# Patient Record
Sex: FEMALE | Race: WHITE | NOT HISPANIC OR LATINO | Employment: OTHER | URBAN - METROPOLITAN AREA
[De-identification: names, ages, dates, MRNs, and addresses within clinical notes are randomized per-mention and may not be internally consistent; named-entity substitution may affect disease eponyms.]

---

## 2023-10-09 DIAGNOSIS — I10 ESSENTIAL HYPERTENSION: ICD-10-CM

## 2023-10-09 DIAGNOSIS — E78.2 MIXED HYPERLIPIDEMIA: ICD-10-CM

## 2023-10-09 RX ORDER — METOPROLOL SUCCINATE 25 MG/1
25 TABLET, EXTENDED RELEASE ORAL DAILY
COMMUNITY
End: 2023-10-09 | Stop reason: SDUPTHER

## 2023-10-09 RX ORDER — PRAVASTATIN SODIUM 20 MG/1
1 TABLET ORAL NIGHTLY
COMMUNITY
Start: 2017-06-05 | End: 2023-10-09 | Stop reason: SDUPTHER

## 2023-10-09 RX ORDER — METOPROLOL SUCCINATE 25 MG/1
25 TABLET, EXTENDED RELEASE ORAL 2 TIMES DAILY
Qty: 180 TABLET | Refills: 3 | Status: SHIPPED | OUTPATIENT
Start: 2023-10-09

## 2023-10-09 RX ORDER — PRAVASTATIN SODIUM 20 MG/1
20 TABLET ORAL NIGHTLY
Qty: 90 TABLET | Refills: 3 | Status: SHIPPED | OUTPATIENT
Start: 2023-10-09 | End: 2024-10-08

## 2023-11-29 PROBLEM — I10 HTN (HYPERTENSION), BENIGN: Status: ACTIVE | Noted: 2023-11-29

## 2023-11-29 PROBLEM — R55 VASODEPRESSOR SYNCOPE: Status: ACTIVE | Noted: 2023-11-29

## 2023-11-29 PROBLEM — I49.1 PREMATURE ATRIAL CONTRACTIONS: Status: ACTIVE | Noted: 2023-11-29

## 2023-11-29 PROBLEM — G44.309 POST-CONCUSSION HEADACHE: Status: ACTIVE | Noted: 2023-11-29

## 2023-11-29 PROBLEM — Z86.79 ATRIAL FIBRILLATION, CURRENTLY IN SINUS RHYTHM: Status: ACTIVE | Noted: 2023-11-29

## 2023-11-29 PROBLEM — R00.2 PALPITATIONS: Status: ACTIVE | Noted: 2023-11-29

## 2023-11-29 RX ORDER — CHOLECALCIFEROL (VITAMIN D3) 50 MCG
2000 TABLET ORAL 2 TIMES DAILY
COMMUNITY

## 2023-11-29 RX ORDER — METRONIDAZOLE 10 MG/G
GEL TOPICAL DAILY
COMMUNITY
End: 2024-01-01 | Stop reason: ENTERED-IN-ERROR

## 2023-11-29 RX ORDER — HYOSCYAMINE SULFATE 0.12 MG/1
0.12 TABLET SUBLINGUAL EVERY 4 HOURS PRN
COMMUNITY

## 2023-11-29 RX ORDER — AMOXICILLIN 500 MG/1
500 TABLET, FILM COATED ORAL
COMMUNITY
Start: 2023-02-20 | End: 2024-01-01 | Stop reason: ENTERED-IN-ERROR

## 2023-11-29 RX ORDER — DIAZEPAM 5 MG/1
5 TABLET ORAL
COMMUNITY
End: 2024-01-01 | Stop reason: ENTERED-IN-ERROR

## 2023-11-29 RX ORDER — NEOMYCIN SULFATE, POLYMYXIN B SULFATE AND DEXAMETHASONE 3.5; 10000; 1 MG/ML; [USP'U]/ML; MG/ML
1 SUSPENSION/ DROPS OPHTHALMIC
COMMUNITY

## 2023-11-29 RX ORDER — DORZOLAMIDE HCL 20 MG/ML
SOLUTION/ DROPS OPHTHALMIC
COMMUNITY
End: 2024-01-01 | Stop reason: ENTERED-IN-ERROR

## 2023-11-29 RX ORDER — EPINEPHRINE 0.22MG
200 AEROSOL WITH ADAPTER (ML) INHALATION DAILY
COMMUNITY

## 2023-11-29 RX ORDER — PSEUDOEPHEDRINE HCL 30 MG
600 TABLET ORAL
COMMUNITY
End: 2024-01-01 | Stop reason: ENTERED-IN-ERROR

## 2023-11-29 RX ORDER — ELECTROLYTES/DEXTROSE
SOLUTION, ORAL ORAL
COMMUNITY
End: 2024-01-01 | Stop reason: ENTERED-IN-ERROR

## 2023-11-29 RX ORDER — DORZOLAMIDE HYDROCHLORIDE AND TIMOLOL MALEATE 20; 5 MG/ML; MG/ML
1 SOLUTION/ DROPS OPHTHALMIC 2 TIMES DAILY
COMMUNITY
Start: 2023-07-23

## 2023-11-29 RX ORDER — AMLODIPINE BESYLATE 5 MG/1
5 TABLET ORAL EVERY EVENING
COMMUNITY
Start: 2023-07-31

## 2023-11-29 RX ORDER — MULTIVIT-MIN/IRON FUM/FOLIC AC 7.5 MG-4
TABLET ORAL
COMMUNITY
End: 2024-01-01 | Stop reason: ENTERED-IN-ERROR

## 2023-11-29 RX ORDER — ASPIRIN 81 MG/1
81 TABLET ORAL
Status: ON HOLD | COMMUNITY
End: 2024-01-07 | Stop reason: SDUPTHER

## 2023-11-29 RX ORDER — OMEPRAZOLE 20 MG/1
20 CAPSULE, DELAYED RELEASE ORAL DAILY
COMMUNITY

## 2023-11-29 RX ORDER — CLOBETASOL PROPIONATE 0.5 MG/G
1 AEROSOL, FOAM TOPICAL DAILY PRN
COMMUNITY

## 2024-01-01 ENCOUNTER — APPOINTMENT (OUTPATIENT)
Dept: RADIOLOGY | Facility: HOSPITAL | Age: 89
DRG: 481 | End: 2024-01-01
Payer: MEDICARE

## 2024-01-01 ENCOUNTER — HOSPITAL ENCOUNTER (INPATIENT)
Facility: HOSPITAL | Age: 89
LOS: 7 days | Discharge: NURSING, REHAB OR LONG-TERM CARE | DRG: 481 | End: 2024-01-08
Attending: STUDENT IN AN ORGANIZED HEALTH CARE EDUCATION/TRAINING PROGRAM | Admitting: INTERNAL MEDICINE
Payer: MEDICARE

## 2024-01-01 ENCOUNTER — APPOINTMENT (OUTPATIENT)
Dept: CARDIOLOGY | Facility: HOSPITAL | Age: 89
DRG: 481 | End: 2024-01-01
Payer: MEDICARE

## 2024-01-01 DIAGNOSIS — E61.1 IRON DEFICIENCY: ICD-10-CM

## 2024-01-01 DIAGNOSIS — S72.141A CLOSED DISPLACED INTERTROCHANTERIC FRACTURE OF RIGHT FEMUR, INITIAL ENCOUNTER (MULTI): Primary | ICD-10-CM

## 2024-01-01 LAB
ABO GROUP (TYPE) IN BLOOD: NORMAL
ALBUMIN SERPL BCP-MCNC: 4 G/DL (ref 3.4–5)
ALP SERPL-CCNC: 48 U/L (ref 33–136)
ALT SERPL W P-5'-P-CCNC: 15 U/L (ref 7–45)
ANION GAP SERPL CALC-SCNC: 12 MMOL/L (ref 10–20)
ANTIBODY SCREEN: NORMAL
AST SERPL W P-5'-P-CCNC: 22 U/L (ref 9–39)
BASOPHILS # BLD AUTO: 0.03 X10*3/UL (ref 0–0.1)
BASOPHILS NFR BLD AUTO: 0.4 %
BILIRUB SERPL-MCNC: 0.4 MG/DL (ref 0–1.2)
BUN SERPL-MCNC: 13 MG/DL (ref 6–23)
CALCIUM SERPL-MCNC: 8.8 MG/DL (ref 8.6–10.3)
CHLORIDE SERPL-SCNC: 100 MMOL/L (ref 98–107)
CO2 SERPL-SCNC: 27 MMOL/L (ref 21–32)
CREAT SERPL-MCNC: 0.53 MG/DL (ref 0.5–1.05)
EOSINOPHIL # BLD AUTO: 0.15 X10*3/UL (ref 0–0.4)
EOSINOPHIL NFR BLD AUTO: 2 %
ERYTHROCYTE [DISTWIDTH] IN BLOOD BY AUTOMATED COUNT: 12.2 % (ref 11.5–14.5)
GFR SERPL CREATININE-BSD FRML MDRD: 87 ML/MIN/1.73M*2
GLUCOSE SERPL-MCNC: 84 MG/DL (ref 74–99)
HCT VFR BLD AUTO: 39.4 % (ref 36–46)
HGB BLD-MCNC: 12.7 G/DL (ref 12–16)
IMM GRANULOCYTES # BLD AUTO: 0.04 X10*3/UL (ref 0–0.5)
IMM GRANULOCYTES NFR BLD AUTO: 0.5 % (ref 0–0.9)
INR PPP: 1 (ref 0.9–1.1)
LYMPHOCYTES # BLD AUTO: 2.56 X10*3/UL (ref 0.8–3)
LYMPHOCYTES NFR BLD AUTO: 34.7 %
MCH RBC QN AUTO: 31.4 PG (ref 26–34)
MCHC RBC AUTO-ENTMCNC: 32.2 G/DL (ref 32–36)
MCV RBC AUTO: 98 FL (ref 80–100)
MONOCYTES # BLD AUTO: 0.81 X10*3/UL (ref 0.05–0.8)
MONOCYTES NFR BLD AUTO: 11 %
NEUTROPHILS # BLD AUTO: 3.79 X10*3/UL (ref 1.6–5.5)
NEUTROPHILS NFR BLD AUTO: 51.4 %
NRBC BLD-RTO: 0 /100 WBCS (ref 0–0)
PLATELET # BLD AUTO: 360 X10*3/UL (ref 150–450)
POTASSIUM SERPL-SCNC: 4 MMOL/L (ref 3.5–5.3)
PROT SERPL-MCNC: 6.2 G/DL (ref 6.4–8.2)
PROTHROMBIN TIME: 10.8 SECONDS (ref 9.8–12.8)
RBC # BLD AUTO: 4.04 X10*6/UL (ref 4–5.2)
RH FACTOR (ANTIGEN D): NORMAL
SODIUM SERPL-SCNC: 135 MMOL/L (ref 136–145)
WBC # BLD AUTO: 7.4 X10*3/UL (ref 4.4–11.3)

## 2024-01-01 PROCEDURE — 99285 EMERGENCY DEPT VISIT HI MDM: CPT | Performed by: STUDENT IN AN ORGANIZED HEALTH CARE EDUCATION/TRAINING PROGRAM

## 2024-01-01 PROCEDURE — 72125 CT NECK SPINE W/O DYE: CPT

## 2024-01-01 PROCEDURE — 73502 X-RAY EXAM HIP UNI 2-3 VIEWS: CPT | Mod: RT,FY,FR

## 2024-01-01 PROCEDURE — 71045 X-RAY EXAM CHEST 1 VIEW: CPT | Performed by: STUDENT IN AN ORGANIZED HEALTH CARE EDUCATION/TRAINING PROGRAM

## 2024-01-01 PROCEDURE — 96375 TX/PRO/DX INJ NEW DRUG ADDON: CPT

## 2024-01-01 PROCEDURE — 96365 THER/PROPH/DIAG IV INF INIT: CPT | Mod: 59

## 2024-01-01 PROCEDURE — 72125 CT NECK SPINE W/O DYE: CPT | Performed by: RADIOLOGY

## 2024-01-01 PROCEDURE — 71045 X-RAY EXAM CHEST 1 VIEW: CPT

## 2024-01-01 PROCEDURE — 73560 X-RAY EXAM OF KNEE 1 OR 2: CPT | Mod: RT,FY

## 2024-01-01 PROCEDURE — 86900 BLOOD TYPING SEROLOGIC ABO: CPT | Mod: 91

## 2024-01-01 PROCEDURE — 85610 PROTHROMBIN TIME: CPT

## 2024-01-01 PROCEDURE — 2500000001 HC RX 250 WO HCPCS SELF ADMINISTERED DRUGS (ALT 637 FOR MEDICARE OP)

## 2024-01-01 PROCEDURE — 73700 CT LOWER EXTREMITY W/O DYE: CPT | Mod: RT

## 2024-01-01 PROCEDURE — 70450 CT HEAD/BRAIN W/O DYE: CPT

## 2024-01-01 PROCEDURE — 96367 TX/PROPH/DG ADDL SEQ IV INF: CPT

## 2024-01-01 PROCEDURE — 85025 COMPLETE CBC W/AUTO DIFF WBC: CPT

## 2024-01-01 PROCEDURE — 36415 COLL VENOUS BLD VENIPUNCTURE: CPT

## 2024-01-01 PROCEDURE — 73560 X-RAY EXAM OF KNEE 1 OR 2: CPT | Mod: RIGHT SIDE | Performed by: RADIOLOGY

## 2024-01-01 PROCEDURE — 86920 COMPATIBILITY TEST SPIN: CPT

## 2024-01-01 PROCEDURE — 2500000004 HC RX 250 GENERAL PHARMACY W/ HCPCS (ALT 636 FOR OP/ED)

## 2024-01-01 PROCEDURE — 96372 THER/PROPH/DIAG INJ SC/IM: CPT

## 2024-01-01 PROCEDURE — 93005 ELECTROCARDIOGRAM TRACING: CPT

## 2024-01-01 PROCEDURE — 73552 X-RAY EXAM OF FEMUR 2/>: CPT | Mod: RT

## 2024-01-01 PROCEDURE — 1200000002 HC GENERAL ROOM WITH TELEMETRY DAILY

## 2024-01-01 PROCEDURE — 70450 CT HEAD/BRAIN W/O DYE: CPT | Performed by: RADIOLOGY

## 2024-01-01 PROCEDURE — 93010 ELECTROCARDIOGRAM REPORT: CPT | Performed by: STUDENT IN AN ORGANIZED HEALTH CARE EDUCATION/TRAINING PROGRAM

## 2024-01-01 PROCEDURE — 80053 COMPREHEN METABOLIC PANEL: CPT

## 2024-01-01 PROCEDURE — 73552 X-RAY EXAM OF FEMUR 2/>: CPT | Mod: RIGHT SIDE | Performed by: RADIOLOGY

## 2024-01-01 PROCEDURE — 93010 ELECTROCARDIOGRAM REPORT: CPT | Performed by: INTERNAL MEDICINE

## 2024-01-01 PROCEDURE — 73700 CT LOWER EXTREMITY W/O DYE: CPT | Mod: RIGHT SIDE | Performed by: RADIOLOGY

## 2024-01-01 PROCEDURE — 73502 X-RAY EXAM HIP UNI 2-3 VIEWS: CPT | Mod: RIGHT SIDE | Performed by: RADIOLOGY

## 2024-01-01 RX ORDER — ACETAMINOPHEN 325 MG/1
975 TABLET ORAL ONCE
Status: COMPLETED | OUTPATIENT
Start: 2024-01-01 | End: 2024-01-01

## 2024-01-01 RX ORDER — METRONIDAZOLE 10 MG/G
1 GEL TOPICAL DAILY PRN
COMMUNITY

## 2024-01-01 RX ORDER — DORZOLAMIDE HYDROCHLORIDE AND TIMOLOL MALEATE 20; 5 MG/ML; MG/ML
1 SOLUTION/ DROPS OPHTHALMIC 2 TIMES DAILY
Status: DISCONTINUED | OUTPATIENT
Start: 2024-01-01 | End: 2024-01-08 | Stop reason: HOSPADM

## 2024-01-01 RX ORDER — SODIUM CHLORIDE, SODIUM LACTATE, POTASSIUM CHLORIDE, CALCIUM CHLORIDE 600; 310; 30; 20 MG/100ML; MG/100ML; MG/100ML; MG/100ML
75 INJECTION, SOLUTION INTRAVENOUS CONTINUOUS
Status: ACTIVE | OUTPATIENT
Start: 2024-01-01 | End: 2024-01-02

## 2024-01-01 RX ORDER — MULTIVIT-MIN/IRON FUM/FOLIC AC 7.5 MG-4
1 TABLET ORAL DAILY
COMMUNITY

## 2024-01-01 RX ORDER — METOPROLOL SUCCINATE 25 MG/1
25 TABLET, EXTENDED RELEASE ORAL 2 TIMES DAILY
Status: DISCONTINUED | OUTPATIENT
Start: 2024-01-01 | End: 2024-01-08 | Stop reason: HOSPADM

## 2024-01-01 RX ORDER — HYOSCYAMINE SULFATE 0.12 MG/1
0.12 TABLET, ORALLY DISINTEGRATING ORAL EVERY 4 HOURS PRN
Status: DISCONTINUED | OUTPATIENT
Start: 2024-01-01 | End: 2024-01-08 | Stop reason: HOSPADM

## 2024-01-01 RX ORDER — ONDANSETRON HYDROCHLORIDE 2 MG/ML
4 INJECTION, SOLUTION INTRAVENOUS ONCE
Status: COMPLETED | OUTPATIENT
Start: 2024-01-01 | End: 2024-01-01

## 2024-01-01 RX ORDER — NEOMYCIN SULFATE, POLYMYXIN B SULFATE AND DEXAMETHASONE 3.5; 10000; 1 MG/ML; [USP'U]/ML; MG/ML
1 SUSPENSION/ DROPS OPHTHALMIC
Status: DISCONTINUED | OUTPATIENT
Start: 2024-01-07 | End: 2024-01-08

## 2024-01-01 RX ORDER — CEFTRIAXONE 2 G/50ML
2 INJECTION, SOLUTION INTRAVENOUS ONCE
Status: COMPLETED | OUTPATIENT
Start: 2024-01-01 | End: 2024-01-01

## 2024-01-01 RX ORDER — PRAVASTATIN SODIUM 20 MG/1
20 TABLET ORAL NIGHTLY
Status: DISCONTINUED | OUTPATIENT
Start: 2024-01-01 | End: 2024-01-08 | Stop reason: HOSPADM

## 2024-01-01 RX ORDER — ACETAMINOPHEN 325 MG/1
650 TABLET ORAL EVERY 6 HOURS PRN
Status: DISCONTINUED | OUTPATIENT
Start: 2024-01-01 | End: 2024-01-02

## 2024-01-01 RX ORDER — ONDANSETRON HYDROCHLORIDE 2 MG/ML
4 INJECTION, SOLUTION INTRAVENOUS EVERY 4 HOURS PRN
Status: DISCONTINUED | OUTPATIENT
Start: 2024-01-01 | End: 2024-01-08 | Stop reason: HOSPADM

## 2024-01-01 RX ORDER — ASPIRIN 81 MG/1
81 TABLET ORAL
Status: DISCONTINUED | OUTPATIENT
Start: 2024-01-01 | End: 2024-01-08 | Stop reason: HOSPADM

## 2024-01-01 RX ORDER — CALCIUM CARBONATE 500(1250)
1250 TABLET ORAL DAILY
Status: DISCONTINUED | OUTPATIENT
Start: 2024-01-01 | End: 2024-01-08 | Stop reason: HOSPADM

## 2024-01-01 RX ORDER — DICLOFENAC SODIUM 10 MG/G
4 GEL TOPICAL 4 TIMES DAILY PRN
COMMUNITY

## 2024-01-01 RX ORDER — AMLODIPINE BESYLATE 5 MG/1
5 TABLET ORAL EVERY EVENING
Status: DISCONTINUED | OUTPATIENT
Start: 2024-01-01 | End: 2024-01-08 | Stop reason: HOSPADM

## 2024-01-01 RX ORDER — POLYETHYLENE GLYCOL 3350 17 G/17G
17 POWDER, FOR SOLUTION ORAL DAILY
Status: DISCONTINUED | OUTPATIENT
Start: 2024-01-01 | End: 2024-01-08 | Stop reason: HOSPADM

## 2024-01-01 RX ORDER — ENOXAPARIN SODIUM 100 MG/ML
40 INJECTION SUBCUTANEOUS EVERY 24 HOURS
Status: DISCONTINUED | OUTPATIENT
Start: 2024-01-01 | End: 2024-01-02 | Stop reason: SDUPTHER

## 2024-01-01 RX ORDER — CALCIUM CARBONATE 600 MG
1 TABLET ORAL DAILY
COMMUNITY

## 2024-01-01 RX ORDER — CHOLECALCIFEROL (VITAMIN D3) 25 MCG
2000 TABLET ORAL 2 TIMES DAILY
Status: DISCONTINUED | OUTPATIENT
Start: 2024-01-01 | End: 2024-01-08 | Stop reason: HOSPADM

## 2024-01-01 RX ADMIN — SODIUM CHLORIDE 1000 ML: 9 INJECTION, SOLUTION INTRAVENOUS at 15:49

## 2024-01-01 RX ADMIN — ONDANSETRON 4 MG: 2 INJECTION INTRAMUSCULAR; INTRAVENOUS at 13:35

## 2024-01-01 RX ADMIN — SODIUM CHLORIDE, POTASSIUM CHLORIDE, SODIUM LACTATE AND CALCIUM CHLORIDE 75 ML/HR: 600; 310; 30; 20 INJECTION, SOLUTION INTRAVENOUS at 22:12

## 2024-01-01 RX ADMIN — ACETAMINOPHEN 975 MG: 325 TABLET ORAL at 13:35

## 2024-01-01 RX ADMIN — AMLODIPINE BESYLATE 5 MG: 5 TABLET ORAL at 22:06

## 2024-01-01 RX ADMIN — PRAVASTATIN SODIUM 20 MG: 20 TABLET ORAL at 22:06

## 2024-01-01 RX ADMIN — ENOXAPARIN SODIUM 40 MG: 40 INJECTION SUBCUTANEOUS at 22:06

## 2024-01-01 RX ADMIN — CEFTRIAXONE SODIUM 2 G: 2 INJECTION, SOLUTION INTRAVENOUS at 15:55

## 2024-01-01 RX ADMIN — AZITHROMYCIN MONOHYDRATE 500 MG: 500 INJECTION, POWDER, LYOPHILIZED, FOR SOLUTION INTRAVENOUS at 15:55

## 2024-01-01 RX ADMIN — HYDROMORPHONE HYDROCHLORIDE 0.2 MG: 0.2 INJECTION, SOLUTION INTRAMUSCULAR; INTRAVENOUS; SUBCUTANEOUS at 22:08

## 2024-01-01 RX ADMIN — METOPROLOL SUCCINATE 25 MG: 25 TABLET, EXTENDED RELEASE ORAL at 22:07

## 2024-01-01 SDOH — SOCIAL STABILITY: SOCIAL INSECURITY: WERE YOU ABLE TO COMPLETE ALL THE BEHAVIORAL HEALTH SCREENINGS?: YES

## 2024-01-01 SDOH — SOCIAL STABILITY: SOCIAL INSECURITY: ARE THERE ANY APPARENT SIGNS OF INJURIES/BEHAVIORS THAT COULD BE RELATED TO ABUSE/NEGLECT?: NO

## 2024-01-01 SDOH — SOCIAL STABILITY: SOCIAL INSECURITY: DOES ANYONE TRY TO KEEP YOU FROM HAVING/CONTACTING OTHER FRIENDS OR DOING THINGS OUTSIDE YOUR HOME?: NO

## 2024-01-01 SDOH — SOCIAL STABILITY: SOCIAL INSECURITY: ARE YOU OR HAVE YOU BEEN THREATENED OR ABUSED PHYSICALLY, EMOTIONALLY, OR SEXUALLY BY ANYONE?: NO

## 2024-01-01 SDOH — SOCIAL STABILITY: SOCIAL INSECURITY: ABUSE: ADULT

## 2024-01-01 SDOH — SOCIAL STABILITY: SOCIAL INSECURITY: DO YOU FEEL ANYONE HAS EXPLOITED OR TAKEN ADVANTAGE OF YOU FINANCIALLY OR OF YOUR PERSONAL PROPERTY?: NO

## 2024-01-01 SDOH — SOCIAL STABILITY: SOCIAL INSECURITY: HAS ANYONE EVER THREATENED TO HURT YOUR FAMILY OR YOUR PETS?: NO

## 2024-01-01 SDOH — SOCIAL STABILITY: SOCIAL INSECURITY: HAVE YOU HAD THOUGHTS OF HARMING ANYONE ELSE?: NO

## 2024-01-01 SDOH — SOCIAL STABILITY: SOCIAL INSECURITY: DO YOU FEEL UNSAFE GOING BACK TO THE PLACE WHERE YOU ARE LIVING?: NO

## 2024-01-01 ASSESSMENT — LIFESTYLE VARIABLES
HOW OFTEN DURING THE LAST YEAR HAVE YOU FAILED TO DO WHAT WAS NORMALLY EXPECTED FROM YOU BECAUSE OF DRINKING: NEVER
HAVE YOU EVER FELT YOU SHOULD CUT DOWN ON YOUR DRINKING: NO
EVER FELT BAD OR GUILTY ABOUT YOUR DRINKING: NO
AUDIT TOTAL SCORE: 4
AUDIT-C TOTAL SCORE: 4
SKIP TO QUESTIONS 9-10: 1
HOW OFTEN DO YOU HAVE 6 OR MORE DRINKS ON ONE OCCASION: NEVER
HAS A RELATIVE, FRIEND, DOCTOR, OR ANOTHER HEALTH PROFESSIONAL EXPRESSED CONCERN ABOUT YOUR DRINKING OR SUGGESTED YOU CUT DOWN: NO
SKIP TO QUESTIONS 9-10: 1
HOW OFTEN DURING THE LAST YEAR HAVE YOU BEEN UNABLE TO REMEMBER WHAT HAPPENED THE NIGHT BEFORE BECAUSE YOU HAD BEEN DRINKING: NEVER
HOW MANY STANDARD DRINKS CONTAINING ALCOHOL DO YOU HAVE ON A TYPICAL DAY: 1 OR 2
HAVE YOU OR SOMEONE ELSE BEEN INJURED AS A RESULT OF YOUR DRINKING: NO
HOW OFTEN DURING THE LAST YEAR HAVE YOU FOUND THAT YOU WERE NOT ABLE TO STOP DRINKING ONCE YOU HAD STARTED: NEVER
EVER HAD A DRINK FIRST THING IN THE MORNING TO STEADY YOUR NERVES TO GET RID OF A HANGOVER: NO
SUBSTANCE_ABUSE_PAST_12_MONTHS: NO
HOW OFTEN DO YOU HAVE A DRINK CONTAINING ALCOHOL: NEVER
HOW OFTEN DO YOU HAVE SIX OR MORE DRINKS ON ONE OCCASION: NEVER
HAVE PEOPLE ANNOYED YOU BY CRITICIZING YOUR DRINKING: NO
AUDIT-C TOTAL SCORE: 0
HOW OFTEN DURING THE LAST YEAR HAVE YOU HAD A FEELING OF GUILT OR REMORSE AFTER DRINKING: NEVER
AUDIT-C TOTAL SCORE: 0
HOW OFTEN DO YOU HAVE A DRINK CONTAINING ALCOHOL: 4 OR MORE TIMES A WEEK
PRESCIPTION_ABUSE_PAST_12_MONTHS: NO
HOW MANY STANDARD DRINKS CONTAINING ALCOHOL DO YOU HAVE ON A TYPICAL DAY: PATIENT DOES NOT DRINK
HOW OFTEN DURING THE LAST YEAR HAVE YOU NEEDED AN ALCOHOLIC DRINK FIRST THING IN THE MORNING TO GET YOURSELF GOING AFTER A NIGHT OF HEAVY DRINKING: NEVER

## 2024-01-01 ASSESSMENT — PAIN DESCRIPTION - ORIENTATION: ORIENTATION: RIGHT

## 2024-01-01 ASSESSMENT — COGNITIVE AND FUNCTIONAL STATUS - GENERAL
TOILETING: A LOT
DAILY ACTIVITIY SCORE: 11
PATIENT BASELINE BEDBOUND: NO
MOVING TO AND FROM BED TO CHAIR: TOTAL
CLIMB 3 TO 5 STEPS WITH RAILING: TOTAL
MOVING FROM LYING ON BACK TO SITTING ON SIDE OF FLAT BED WITH BEDRAILS: A LOT
MOBILITY SCORE: 7
DRESSING REGULAR UPPER BODY CLOTHING: A LOT
STANDING UP FROM CHAIR USING ARMS: TOTAL
EATING MEALS: A LITTLE
DRESSING REGULAR LOWER BODY CLOTHING: TOTAL
WALKING IN HOSPITAL ROOM: TOTAL
HELP NEEDED FOR BATHING: TOTAL
TURNING FROM BACK TO SIDE WHILE IN FLAT BAD: TOTAL
PERSONAL GROOMING: A LOT

## 2024-01-01 ASSESSMENT — PATIENT HEALTH QUESTIONNAIRE - PHQ9
1. LITTLE INTEREST OR PLEASURE IN DOING THINGS: NOT AT ALL
2. FEELING DOWN, DEPRESSED OR HOPELESS: NOT AT ALL
SUM OF ALL RESPONSES TO PHQ9 QUESTIONS 1 & 2: 0

## 2024-01-01 ASSESSMENT — ACTIVITIES OF DAILY LIVING (ADL)
TOILETING: NEEDS ASSISTANCE
LACK_OF_TRANSPORTATION: NO
HEARING - LEFT EAR: FUNCTIONAL
BATHING: NEEDS ASSISTANCE
ADEQUATE_TO_COMPLETE_ADL: YES
DRESSING YOURSELF: NEEDS ASSISTANCE
GROOMING: INDEPENDENT
WALKS IN HOME: NEEDS ASSISTANCE
JUDGMENT_ADEQUATE_SAFELY_COMPLETE_DAILY_ACTIVITIES: YES
ASSISTIVE_DEVICE: WALKER
FEEDING YOURSELF: INDEPENDENT
PATIENT'S MEMORY ADEQUATE TO SAFELY COMPLETE DAILY ACTIVITIES?: YES
HEARING - RIGHT EAR: FUNCTIONAL

## 2024-01-01 ASSESSMENT — COLUMBIA-SUICIDE SEVERITY RATING SCALE - C-SSRS
2. HAVE YOU ACTUALLY HAD ANY THOUGHTS OF KILLING YOURSELF?: NO
1. IN THE PAST MONTH, HAVE YOU WISHED YOU WERE DEAD OR WISHED YOU COULD GO TO SLEEP AND NOT WAKE UP?: NO
6. HAVE YOU EVER DONE ANYTHING, STARTED TO DO ANYTHING, OR PREPARED TO DO ANYTHING TO END YOUR LIFE?: NO

## 2024-01-01 ASSESSMENT — PAIN DESCRIPTION - PAIN TYPE: TYPE: ACUTE PAIN

## 2024-01-01 ASSESSMENT — PAIN - FUNCTIONAL ASSESSMENT
PAIN_FUNCTIONAL_ASSESSMENT: 0-10
PAIN_FUNCTIONAL_ASSESSMENT: 0-10

## 2024-01-01 ASSESSMENT — PAIN DESCRIPTION - DESCRIPTORS: DESCRIPTORS: DISCOMFORT

## 2024-01-01 ASSESSMENT — PAIN DESCRIPTION - ONSET: ONSET: SUDDEN

## 2024-01-01 ASSESSMENT — PAIN DESCRIPTION - FREQUENCY: FREQUENCY: CONSTANT/CONTINUOUS

## 2024-01-01 ASSESSMENT — PAIN DESCRIPTION - PROGRESSION: CLINICAL_PROGRESSION: NOT CHANGED

## 2024-01-01 ASSESSMENT — PAIN SCALES - GENERAL
PAINLEVEL_OUTOF10: 5 - MODERATE PAIN
PAINLEVEL_OUTOF10: 6

## 2024-01-01 ASSESSMENT — PAIN DESCRIPTION - LOCATION: LOCATION: HIP

## 2024-01-01 NOTE — HOSPITAL COURSE
Estelle Jung, a 89-year-old female with a history of hypertension, hyperlipidemia, osteoporosis, and paroxysmal A-fib, presented to the ED with right hip pain following a mechanical fall. She denied loss of consciousness or other symptoms.     ED course  ED findings included stable vital signs with elevated blood pressure (190/83), labs showing hyponatremia (135), and an EKG indicating sinus bradycardia (HR 55, QTc 413). Imaging revealed a right femoral neck fracture. Ortho consultation led to a CT head, confirming an intertrochanteric fracture. She was admitted for ORIF     Hospital stay  In the next days she underwent open reduction internal fixation of the right hip with normal postoperative course.  Physical therapy and Occupational Therapy recommended high intensity physical therapy.  Prior orthopedic recommendation,  aspirin 81 mg p.o. twice daily x 30 days at discharge for VTE prophylaxis. Patient continued to improve clinically and was deemed medically stable for discharge. Patient was discharged in stable condition, with plans to follow-up with the PCP clinic and Dr. Cooper for further care.

## 2024-01-01 NOTE — ED PROVIDER NOTES
HPI   No chief complaint on file.      Patient is a 89-year-old female with hypertension, hyperlipidemia, orthostatic hypotension presents emerged part after mechanical fall.  She was walking to the grocery store when she tripped.  She landed on her right side, hurting her right hip as well as hitting the right side of her head.  She denies losing any consciousness.  She has some pain at her right hip and knee, but denies any pain along her neck, back, or extremities otherwise.  She denies any numbness or weakness of any extremities.  She takes an aspirin but denies any other antiplatelets or blood thinners.      History provided by:  Patient                      No data recorded                Patient History   Past Medical History:   Diagnosis Date    Personal history of diseases of the skin and subcutaneous tissue     History of eczema    Personal history of other endocrine, nutritional and metabolic disease 06/08/2015    History of hyperlipidemia    Personal history of other specified conditions     History of syncope     Past Surgical History:   Procedure Laterality Date    COLONOSCOPY  10/09/2014    Complete Colonoscopy    KIDNEY SURGERY  10/09/2014    Kidney Surgery    OTHER SURGICAL HISTORY  10/09/2014    Skin Debridement    OTHER SURGICAL HISTORY  10/09/2014    Surgery    OTHER SURGICAL HISTORY  10/09/2014    Supracervical Hysterectomy Laparoscopic     No family history on file.  Social History     Tobacco Use    Smoking status: Not on file    Smokeless tobacco: Not on file   Substance Use Topics    Alcohol use: Not on file    Drug use: Not on file       Physical Exam   ED Triage Vitals [01/01/24 1146]   Temp Heart Rate Resp BP   36.5 °C (97.7 °F) 72 18 (!) 190/83      SpO2 Temp src Heart Rate Source Patient Position   96 % -- Monitor Sitting      BP Location FiO2 (%)     Right arm --       Physical Exam  Vitals and nursing note reviewed.   Constitutional:       General: She is not in acute distress.      Appearance: She is well-developed.   HENT:      Head: Normocephalic.      Comments: No break in the skin of her scalp or obvious swelling on external examination.     Right Ear: External ear normal.      Left Ear: External ear normal.      Nose: Nose normal.      Mouth/Throat:      Mouth: Mucous membranes are moist.   Eyes:      General: No scleral icterus.     Extraocular Movements: Extraocular movements intact.      Conjunctiva/sclera: Conjunctivae normal.      Pupils: Pupils are equal, round, and reactive to light.   Cardiovascular:      Rate and Rhythm: Normal rate and regular rhythm.      Heart sounds: No murmur heard.  Pulmonary:      Effort: Pulmonary effort is normal. No respiratory distress.      Breath sounds: Normal breath sounds.   Abdominal:      Palpations: Abdomen is soft.      Tenderness: There is no abdominal tenderness.   Musculoskeletal:         General: Tenderness, deformity and signs of injury present.      Cervical back: Neck supple.      Comments: Right lower extremity is externally rotated and shortened.  Range of motion limited.   Skin:     General: Skin is warm and dry.      Capillary Refill: Capillary refill takes less than 2 seconds.   Neurological:      General: No focal deficit present.      Mental Status: She is alert and oriented to person, place, and time.      Cranial Nerves: No cranial nerve deficit.      Sensory: No sensory deficit.      Motor: No weakness.   Psychiatric:         Mood and Affect: Mood normal.         ED Course & MDM   Diagnoses as of 01/01/24 1739   Closed displaced intertrochanteric fracture of right femur, initial encounter (CMS/[facility])       Medical Decision Making  Patient is a 89-year-old female presents the emergency department for a mechanical fall.  On arrival, she is noted to be hypertensive, but afebrile hemodynamically stable.  She does take aspirin but no other blood thinners or antiplatelets.  She is awake and alert, GCS 15, right lower extremity is  noted to be externally rotated and shortened.  She is able to wiggle her toes and move her foot, but is unable to lift her leg.  She is able to move her other extremities without difficulty or weakness.  Her right lower extremity is neurovascularly intact with 2+ DP pulses.  Pain medications offered, she is requesting Tylenol.  She does feel a little bit of nausea, so Zofran 4 mg IV is ordered.  Will obtain CT imaging of her head and C-spine.  X-ray imaging of her right hip and knee was will be obtained.  Suspect that she has a hip fracture given the physical examination.  Will obtain lab work in anticipation of possible surgical intervention.    CT hip right wo IV contrast   Final Result    Intertrochanteric fracture.          Signed by: Mejia Rich 1/1/2024 4:56 PM    Dictation workstation:   [id number]     XR chest 1 view   Final Result    No acute cardiopulmonary process. Previously described opacities most    likely represent volume averaging or atelectasis.                MACRO:    None.          Signed by: Gurpreet Woods 1/1/2024 4:23 PM    Dictation workstation:   [id number]     XR hip right with pelvis when performed 2 or 3 views   Final Result    1.  Displaced angulated right femoral neck fracture.  No definite    associated dislocation..    Signed by Xavi Coy MD     XR knee right 1-2 views   Final Result    1. No acute fracture or dislocation.    2. Degenerative changes, as described above.          MACRO:    None.          Signed by: Zbigniew Christensen 1/1/2024 12:48 PM    Dictation workstation:   [id number]     CT head wo IV contrast   Final Result    No acute intracranial hemorrhage or mass-effect.          MACRO:    None.          Signed by: Carmen Barrett 1/1/2024 12:39 PM    Dictation workstation:   [id number]     CT cervical spine wo IV contrast   Final Result    No cervical vertebral displacement or acute displaced fracture.    Osteopenia and degenerative changes noted.          Partially  imaged infiltrate in the medial left apex and subcentimeter    hypodense nodule in the right lobe of the thyroid incidentally noted.          MACRO:    None.          Signed by: Carmen Barrett 1/1/2024 12:45 PM    Dictation workstation:   [id number]        Results for orders placed or performed during the hospital encounter of 01/01/24  -CBC and Auto Differential:        Result                      Value             Ref Range           WBC                         7.4               4.4 - 11.3 x*       nRBC                        0.0               0.0 - 0.0 /1*       RBC                         4.04              4.00 - 5.20 *       Hemoglobin                  12.7              12.0 - 16.0 *       Hematocrit                  39.4              36.0 - 46.0 %       MCV                         98                80 - 100 fL         MCH                         31.4              26.0 - 34.0 *       MCHC                        32.2              32.0 - 36.0 *       RDW                         12.2              11.5 - 14.5 %       Platelets                   360               150 - 450 x1*       Neutrophils %               51.4              40.0 - 80.0 %       Immature Granulocytes *     0.5               0.0 - 0.9 %         Lymphocytes %               34.7              13.0 - 44.0 %       Monocytes %                 11.0              2.0 - 10.0 %        Eosinophils %               2.0               0.0 - 6.0 %         Basophils %                 0.4               0.0 - 2.0 %         Neutrophils Absolute        3.79              1.60 - 5.50 *       Immature Granulocytes *     0.04              0.00 - 0.50 *       Lymphocytes Absolute        2.56              0.80 - 3.00 *       Monocytes Absolute          0.81 (H)          0.05 - 0.80 *       Eosinophils Absolute        0.15              0.00 - 0.40 *       Basophils Absolute          0.03              0.00 - 0.10 *  -Comprehensive metabolic panel:        Result                       Value             Ref Range           Glucose                     84                74 - 99 mg/dL       Sodium                      135 (L)           136 - 145 mm*       Potassium                   4.0               3.5 - 5.3 mm*       Chloride                    100               98 - 107 mmo*       Bicarbonate                 27                21 - 32 mmol*       Anion Gap                   12                10 - 20 mmol*       Urea Nitrogen               13                6 - 23 mg/dL        Creatinine                  0.53              0.50 - 1.05 *       eGFR                        87                >60 mL/min/1*       Calcium                     8.8               8.6 - 10.3 m*       Albumin                     4.0               3.4 - 5.0 g/*       Alkaline Phosphatase        48                33 - 136 U/L        Total Protein               6.2 (L)           6.4 - 8.2 g/*       AST                         22                9 - 39 U/L          Bilirubin, Total            0.4               0.0 - 1.2 mg*       ALT                         15                7 - 45 U/L     -Protime-INR:        Result                      Value             Ref Range           Protime                     10.8              9.8 - 12.8 s*       INR                         1.0               0.9 - 1.1      -Type and Screen:        Result                      Value             Ref Range           ABO TYPE                    O                                     Rh TYPE                     POS                                   ANTIBODY SCREEN             NEG                                 X-ray shows a right femoral neck fracture.  Patient has seen Dr. Shalom Ponce in the past for a humerus fracture.  She would like for us to contact him if possible.  Dr. Cooper is on-call for his group, so we discussed the patient's case with Dr. Cooper.  He requested that we obtain a CT of the hip to evaluate for any additional injuries of the hip and  pelvis.  This was done and negative for other injuries besides the intertrochanteric fracture. Dr. Cooper reviewed the images himself.  This will likely be operative management and he requesting that we make the patient n.p.o. after midnight.  He plans to see the patient in person tonight.      CT imaging of the cervical spine did see a partially visualized opacity in the upper lobes, concerning for pneumonia.  Patient is started on ceftriaxone and azithromycin.  However, a formal chest x-ray is performed, which shows a likely atelectasis.    Discussed the patient's case with the medicine service and patient is admitted to the teaching service.    Gurpreet Perez DO, PGY 3  Emergency Medicine Resident    Amount and/or Complexity of Data Reviewed  Labs: ordered.  Radiology: ordered and independent interpretation performed.  ECG/medicine tests: ordered and independent interpretation performed.     Details: EKG at 1415 on 1/1/2024 as interpreted by myself: Ventricular rate 55, , QRS 78, QTc 413.  Sinus bradycardia.  No acute injury pattern.        Procedure  Procedures     Gurpreet Perez DO  Resident  01/01/24 3872

## 2024-01-01 NOTE — H&P
INTERNAL MEDICINE HISTORY AND PHYSICAL  TEACHING SERVICE - GREEN TEAM    Subjective   Estelle Jung  is a 89 y.o. female who presented to the ED with complaint of right hip pain after mechanical fall.  She has significant medical history of hypertension, hyperlipidemia, osteoporosis and paroxysmal A-fib.    She mentioned walking into the convenience store when she tripped and landed on her right side, including the right side of her head. She denied any loss of consciousness, dizziness, chest pain, or factors triggering the fall. Additionally, she reported no fever, chills, cough, abdominal pain, nausea, vomiting, numbness, tingling sensation, or changes in bowel or urinary habits    Of note, she has history of left humerus fracture in the past.    ED course:  -Vital signs: Vital stable apart from blood pressure 190/83.  -Labs: CBC was grossly unremarkable, CMP remarkable for hyponatremia of 135.  INR and PTT are normal.  -EKG showed sinus bradycardia with heart rate of 55, QTc 413.  Imaging: CT head, CT spine were negative. X-ray shows a right femoral neck fracture   Interventions: ED consulted Ortho.  Patient was seen by  which he requested a CT head to evaluate additional injury of the hip.  CT right hip showed intertrochanteric fracture patient was admitted under teaching service for possible surgical intervention on 1/2/2024.     Code status: Full code    Past Medical History:   Diagnosis Date    Personal history of diseases of the skin and subcutaneous tissue     History of eczema    Personal history of other endocrine, nutritional and metabolic disease 06/08/2015    History of hyperlipidemia    Personal history of other specified conditions     History of syncope     Past Surgical History:   Procedure Laterality Date    COLONOSCOPY  10/09/2014    Complete Colonoscopy    KIDNEY SURGERY  10/09/2014    Kidney Surgery    OTHER SURGICAL HISTORY  10/09/2014    Skin Debridement    OTHER SURGICAL HISTORY   10/09/2014    Surgery    OTHER SURGICAL HISTORY  10/09/2014    Supracervical Hysterectomy Laparoscopic     (Not in a hospital admission)    Niacin     No family history on file.    Review of Systems:  Review of systems was completed and unless documented above, all other systems were negative for major complaints.        Objective   Vitals:  Most Recent:  Vitals:    01/01/24 1146   BP: (!) 190/83   Pulse: 72   Resp: 18   Temp: 36.5 °C (97.7 °F)   SpO2: 96%       24hr Min/Max:  Temp  Min: 36.5 °C (97.7 °F)  Max: 36.5 °C (97.7 °F)  Pulse  Min: 72  Max: 72  BP  Min: 190/83  Max: 190/83  Resp  Min: 18  Max: 18  SpO2  Min: 96 %  Max: 96 %    Hemodynamic parameters for last 24 hours:       Lab/Radiology/Diagnostic Review:  Results for orders placed or performed during the hospital encounter of 01/01/24 (from the past 24 hour(s))   CBC and Auto Differential   Result Value Ref Range    WBC 7.4 4.4 - 11.3 x10*3/uL    nRBC 0.0 0.0 - 0.0 /100 WBCs    RBC 4.04 4.00 - 5.20 x10*6/uL    Hemoglobin 12.7 12.0 - 16.0 g/dL    Hematocrit 39.4 36.0 - 46.0 %    MCV 98 80 - 100 fL    MCH 31.4 26.0 - 34.0 pg    MCHC 32.2 32.0 - 36.0 g/dL    RDW 12.2 11.5 - 14.5 %    Platelets 360 150 - 450 x10*3/uL    Neutrophils % 51.4 40.0 - 80.0 %    Immature Granulocytes %, Automated 0.5 0.0 - 0.9 %    Lymphocytes % 34.7 13.0 - 44.0 %    Monocytes % 11.0 2.0 - 10.0 %    Eosinophils % 2.0 0.0 - 6.0 %    Basophils % 0.4 0.0 - 2.0 %    Neutrophils Absolute 3.79 1.60 - 5.50 x10*3/uL    Immature Granulocytes Absolute, Automated 0.04 0.00 - 0.50 x10*3/uL    Lymphocytes Absolute 2.56 0.80 - 3.00 x10*3/uL    Monocytes Absolute 0.81 (H) 0.05 - 0.80 x10*3/uL    Eosinophils Absolute 0.15 0.00 - 0.40 x10*3/uL    Basophils Absolute 0.03 0.00 - 0.10 x10*3/uL   Comprehensive metabolic panel   Result Value Ref Range    Glucose 84 74 - 99 mg/dL    Sodium 135 (L) 136 - 145 mmol/L    Potassium 4.0 3.5 - 5.3 mmol/L    Chloride 100 98 - 107 mmol/L    Bicarbonate 27 21 -  32 mmol/L    Anion Gap 12 10 - 20 mmol/L    Urea Nitrogen 13 6 - 23 mg/dL    Creatinine 0.53 0.50 - 1.05 mg/dL    eGFR 87 >60 mL/min/1.73m*2    Calcium 8.8 8.6 - 10.3 mg/dL    Albumin 4.0 3.4 - 5.0 g/dL    Alkaline Phosphatase 48 33 - 136 U/L    Total Protein 6.2 (L) 6.4 - 8.2 g/dL    AST 22 9 - 39 U/L    Bilirubin, Total 0.4 0.0 - 1.2 mg/dL    ALT 15 7 - 45 U/L   Protime-INR   Result Value Ref Range    Protime 10.8 9.8 - 12.8 seconds    INR 1.0 0.9 - 1.1   Type and Screen   Result Value Ref Range    ABO TYPE O     Rh TYPE POS     ANTIBODY SCREEN NEG      CT hip right wo IV contrast    Result Date: 1/1/2024  Interpreted By:  Mejia Rich, STUDY: CT HIP RIGHT WO IV CONTRAST;  1/1/2024 4:31 pm   INDICATION: Signs/Symptoms:Further evaluate R femoral neck fx.   COMPARISON: None.   ACCESSION NUMBER(S): [id number]   ORDERING CLINICIAN: CLAIRE CARLSNO   TECHNIQUE: Helical trans axial images were obtained with additional orthogonal reconstructions with bone technique.   FINDINGS: Bony structures:  There is a comminuted and impacted intertrochanteric fracture with varus angulation. There is reticulation indicating edema and hematoma in the surrounding soft tissue. The remaining bony structures are intact.   Joint spaces:  Mild narrowing of the hip joint indicating mild osteoarthritis without osteophytosis. No significant joint effusion.   Tendons and ligaments:  Maintained as visualized.   Musculature and fascia:  Maintained.   Subcutaneous tissue:  Unremarkable without significant edema.   Vasculature:  No significant atherosclerosis.   ADDITIONAL FINDINGS: None significant       Intertrochanteric fracture.   Signed by: Mejia Rich 1/1/2024 4:56 PM Dictation workstation:   [id number]    XR chest 1 view    Result Date: 1/1/2024  Interpreted By:  Claire Woods, STUDY: XR CHEST 1 VIEW;  1/1/2024 3:40 pm   INDICATION: Signs/Symptoms:Partially visualized lung consolidation on CT C spine.   COMPARISON: 12/29/2019   ACCESSION  NUMBER(S): [id number]   ORDERING CLINICIAN: CLAIRE CARLSON   FINDINGS:     The cardiomediastinal silhouette and pulmonary vasculature are within normal limits. Atherosclerotic aorta. No consolidation, pleural effusion or pneumothorax.   No fracture of the left humeral surgical neck.       No acute cardiopulmonary process. Previously described opacities most likely represent volume averaging or atelectasis.     MACRO: None.   Signed by: Claire Woods 1/1/2024 4:23 PM Dictation workstation:   [id number]    XR hip right with pelvis when performed 2 or 3 views    Result Date: 1/1/2024  STUDY: Pelvis and Right Hip Radiographs; 01/01/2024 11:43AM INDICATION: Right hip pain post fall. COMPARISON: None Available. ACCESSION NUMBER(S): [id number] ORDERING CLINICIAN: CLAIRE CARLSON TECHNIQUE:  AP view of the pelvis and two view(s) of the right hip. FINDINGS:  PELVIS: The pelvic ring is intact.  There is a displaced and angulated fracture involving the right femoral neck.. RIGHT HIP: There is a displaced angulated fracture involving the right femoral neck.. .  There are vascular soft tissue calcifications noted..    1.  Displaced angulated right femoral neck fracture.  No definite associated dislocation.. Signed by Xavi Coy MD    XR knee right 1-2 views    Result Date: 1/1/2024  Interpreted By:  Zbigniew Christensen, STUDY: XR KNEE RIGHT 1-2 VIEWS  1/1/2024 12:42 pm   INDICATION: Signs/Symptoms:Fall, pain   COMPARISON: None.   ACCESSION NUMBER(S): [id number]   ORDERING CLINICIAN: CLAIRE CARLSON   TECHNIQUE: 2 views of the right knee including AP and lateral projections were obtained.   FINDINGS: There is no evidence of acute fracture or dislocation identified. Mild degenerative changes are seen throughout the right knee. No suprapatellar joint effusion is present.       1. No acute fracture or dislocation. 2. Degenerative changes, as described above.   MACRO: None.   Signed by: Zbigniew Christensen 1/1/2024 12:48 PM Dictation workstation:    [id number]    CT cervical spine wo IV contrast    Result Date: 1/1/2024  Interpreted By:  Carmen Barrett, STUDY: CT CERVICAL SPINE WO IV CONTRAST;  1/1/2024 12:27 pm   INDICATION: Signs/Symptoms:Fall, head injury.   COMPARISON: 10/31/2021   ACCESSION NUMBER(S): [id number]   ORDERING CLINICIAN: CLAIRE CARLSON   TECHNIQUE: CT images were obtained through the cervical spine. Sagittal and coronal reconstructions were generated.   FINDINGS:     ALIGNMENT: No significant spondylolisthesis.   VERTEBRAE/DISC SPACES: No compression deformity or acute displaced fracture.  Diffuse osteopenia. Multilevel degenerative changes including the atlanto axial articulation, vertebral endplates and bilateral facet joints.   ADDITIONAL FINDINGS: No abnormal thickening of the prevertebral soft tissues.  Vague interstitial opacity in the medial left apex on the more caudal images. Subcentimeter hypodense nodule in the right lobe of the thyroid.       No cervical vertebral displacement or acute displaced fracture. Osteopenia and degenerative changes noted.   Partially imaged infiltrate in the medial left apex and subcentimeter hypodense nodule in the right lobe of the thyroid incidentally noted.   MACRO: None.   Signed by: Carmen Barrett 1/1/2024 12:45 PM Dictation workstation:   [id number]    CT head wo IV contrast    Result Date: 1/1/2024  Interpreted By:  Carmen Barrett, STUDY: CT HEAD WO IV CONTRAST;  1/1/2024 12:27 pm   INDICATION: Signs/Symptoms:Fall, head injury.   COMPARISON: 10/31/2021   ACCESSION NUMBER(S): [id number]   ORDERING CLINICIAN: CLAIRE CARLSON   TECHNIQUE: Unenhanced CT images of the head were obtained.   FINDINGS: The ventricles, cisterns and sulci are prominent, consistent with diffuse volume loss. There are areas of nonspecific white matter hypodensity, which are probably age related or microvascular in nature. These findings are similar to the previous exam. There is no acute intracranial hemorrhage, mass effect or  midline shift. No extraaxial fluid collection.   No acute displaced calvarial fracture.   Visualized paranasal sinuses are clear.       No acute intracranial hemorrhage or mass-effect.   MACRO: None.   Signed by: Carmen Barrett 1/1/2024 12:39 PM Dictation workstation:   [id number]       Intake/Output:  No intake or output data in the 24 hours ending 01/01/24 1802    Physical exam:    Physical Exam  Constitutional:       Appearance: Normal appearance. She is normal weight.   HENT:      Nose: Nose normal.      Mouth/Throat:      Mouth: Mucous membranes are moist.   Eyes:      Extraocular Movements: Extraocular movements intact.      Pupils: Pupils are equal, round, and reactive to light.   Cardiovascular:      Rate and Rhythm: Normal rate.   Musculoskeletal:      Comments: Right lower extremity is shortened, externally rotated.  No tenderness or deformity with limited range of motion.   Skin:     General: Skin is warm.      Capillary Refill: Capillary refill takes less than 2 seconds.   Neurological:      General: No focal deficit present.      Mental Status: She is alert and oriented to person, place, and time. Mental status is at baseline.   Psychiatric:         Mood and Affect: Mood normal.         Behavior: Behavior normal.         Thought Content: Thought content normal.         Judgment: Judgment normal.        Assessment /Plan    Principal Problem:    Closed displaced intertrochanteric fracture of right femur (CMS/[facility])  Active Problems:    Closed displaced intertrochanteric fracture of right femur, initial encounter (CMS/[facility])      Assessment/Plan:  This is a 89 yo female who initially presented to the ED with complaint of right hip pain after mechanical fall.  She has significant medical history of hypertension, hyperlipidemia, osteoporosis and paroxysmal A-fib.  She has been admitted to the hospital for for possible surgical intervention.      #Right intertrochanteric femur fracture  #Mechanical  fall  Plan:  -N.p.o.  -Orthopedics consulted  -Pain control: acetaminophen, dilaudid as needed  -Nausea: IV Zofran as needed  -LR maintenance fluids  -DVT prophylaxis: Lovenox  -PT/OT    Chronic conditions:  #Paroxysmal atrial fibrillation- not on DOAC  #HTN  #HLD  #Osteoporosis  -Continue home meds as tolerated    Diet: NPO  DVT ppx: Lovenox  Fluids: LR  On Telemetry  Code Status: Full Code    Dispo: Anticipate >2 midnight stays, orthopedics consulted.     This assessment and plan has been discussed with the senior resident, to be staffed with attending physician.    Hola Webb MD   Internal Medicine, PGY-1 .       Patient seen and examined independent of resident.  My input was implemented above and agree with documentation    Van Woods DO  PGY2 IM

## 2024-01-02 ENCOUNTER — ANESTHESIA (OUTPATIENT)
Dept: OPERATING ROOM | Facility: HOSPITAL | Age: 89
DRG: 481 | End: 2024-01-02
Payer: MEDICARE

## 2024-01-02 ENCOUNTER — APPOINTMENT (OUTPATIENT)
Dept: RADIOLOGY | Facility: HOSPITAL | Age: 89
DRG: 481 | End: 2024-01-02
Payer: MEDICARE

## 2024-01-02 ENCOUNTER — ANESTHESIA EVENT (OUTPATIENT)
Dept: OPERATING ROOM | Facility: HOSPITAL | Age: 89
DRG: 481 | End: 2024-01-02
Payer: MEDICARE

## 2024-01-02 ENCOUNTER — APPOINTMENT (OUTPATIENT)
Dept: CARDIOLOGY | Facility: CLINIC | Age: 89
End: 2024-01-02
Payer: MEDICARE

## 2024-01-02 LAB
25(OH)D3 SERPL-MCNC: 44 NG/ML (ref 30–100)
ABO GROUP (TYPE) IN BLOOD: NORMAL
ALBUMIN SERPL BCP-MCNC: 3.1 G/DL (ref 3.4–5)
ANION GAP SERPL CALC-SCNC: 9 MMOL/L (ref 10–20)
BUN SERPL-MCNC: 11 MG/DL (ref 6–23)
CALCIUM SERPL-MCNC: 7.5 MG/DL (ref 8.6–10.3)
CHLORIDE SERPL-SCNC: 99 MMOL/L (ref 98–107)
CO2 SERPL-SCNC: 26 MMOL/L (ref 21–32)
CREAT SERPL-MCNC: 0.32 MG/DL (ref 0.5–1.05)
ERYTHROCYTE [DISTWIDTH] IN BLOOD BY AUTOMATED COUNT: 12.2 % (ref 11.5–14.5)
FERRITIN SERPL-MCNC: 181 NG/ML (ref 8–150)
GFR SERPL CREATININE-BSD FRML MDRD: >90 ML/MIN/1.73M*2
GLUCOSE SERPL-MCNC: 125 MG/DL (ref 74–99)
HCT VFR BLD AUTO: 29 % (ref 36–46)
HGB BLD-MCNC: 9.8 G/DL (ref 12–16)
INR PPP: 1.1 (ref 0.9–1.1)
IRON SATN MFR SERPL: 8 % (ref 25–45)
IRON SERPL-MCNC: 19 UG/DL (ref 35–150)
MAGNESIUM SERPL-MCNC: 1.66 MG/DL (ref 1.6–2.4)
MCH RBC QN AUTO: 32.3 PG (ref 26–34)
MCHC RBC AUTO-ENTMCNC: 33.8 G/DL (ref 32–36)
MCV RBC AUTO: 96 FL (ref 80–100)
NRBC BLD-RTO: 0 /100 WBCS (ref 0–0)
PHOSPHATE SERPL-MCNC: 3.2 MG/DL (ref 2.5–4.9)
PLATELET # BLD AUTO: 249 X10*3/UL (ref 150–450)
POTASSIUM SERPL-SCNC: 3.1 MMOL/L (ref 3.5–5.3)
PROTHROMBIN TIME: 12.4 SECONDS (ref 9.8–12.8)
RBC # BLD AUTO: 3.03 X10*6/UL (ref 4–5.2)
RH FACTOR (ANTIGEN D): NORMAL
SODIUM SERPL-SCNC: 131 MMOL/L (ref 136–145)
TIBC SERPL-MCNC: 231 UG/DL (ref 240–445)
UIBC SERPL-MCNC: 212 UG/DL (ref 110–370)
WBC # BLD AUTO: 10.5 X10*3/UL (ref 4.4–11.3)

## 2024-01-02 PROCEDURE — 2500000004 HC RX 250 GENERAL PHARMACY W/ HCPCS (ALT 636 FOR OP/ED)

## 2024-01-02 PROCEDURE — 73501 X-RAY EXAM HIP UNI 1 VIEW: CPT | Mod: RIGHT SIDE | Performed by: RADIOLOGY

## 2024-01-02 PROCEDURE — 80069 RENAL FUNCTION PANEL: CPT

## 2024-01-02 PROCEDURE — A6213 FOAM DRG >16<=48 SQ IN W/BDR: HCPCS | Performed by: ORTHOPAEDIC SURGERY

## 2024-01-02 PROCEDURE — 7100000002 HC RECOVERY ROOM TIME - EACH INCREMENTAL 1 MINUTE: Performed by: ORTHOPAEDIC SURGERY

## 2024-01-02 PROCEDURE — 2500000004 HC RX 250 GENERAL PHARMACY W/ HCPCS (ALT 636 FOR OP/ED): Performed by: ORTHOPAEDIC SURGERY

## 2024-01-02 PROCEDURE — 36415 COLL VENOUS BLD VENIPUNCTURE: CPT

## 2024-01-02 PROCEDURE — 83735 ASSAY OF MAGNESIUM: CPT

## 2024-01-02 PROCEDURE — 82728 ASSAY OF FERRITIN: CPT

## 2024-01-02 PROCEDURE — 82306 VITAMIN D 25 HYDROXY: CPT

## 2024-01-02 PROCEDURE — 2500000004 HC RX 250 GENERAL PHARMACY W/ HCPCS (ALT 636 FOR OP/ED): Performed by: INTERNAL MEDICINE

## 2024-01-02 PROCEDURE — 2500000005 HC RX 250 GENERAL PHARMACY W/O HCPCS: Performed by: ORTHOPAEDIC SURGERY

## 2024-01-02 PROCEDURE — 1200000002 HC GENERAL ROOM WITH TELEMETRY DAILY

## 2024-01-02 PROCEDURE — 3700000001 HC GENERAL ANESTHESIA TIME - INITIAL BASE CHARGE: Performed by: ORTHOPAEDIC SURGERY

## 2024-01-02 PROCEDURE — 0QS636Z REPOSITION RIGHT UPPER FEMUR WITH INTRAMEDULLARY INTERNAL FIXATION DEVICE, PERCUTANEOUS APPROACH: ICD-10-PCS | Performed by: ORTHOPAEDIC SURGERY

## 2024-01-02 PROCEDURE — 99223 1ST HOSP IP/OBS HIGH 75: CPT

## 2024-01-02 PROCEDURE — A27245 PR OPEN FIX INTER/SUBTROCH FX,IMPLNT: Performed by: ANESTHESIOLOGIST ASSISTANT

## 2024-01-02 PROCEDURE — 2500000004 HC RX 250 GENERAL PHARMACY W/ HCPCS (ALT 636 FOR OP/ED): Performed by: ANESTHESIOLOGIST ASSISTANT

## 2024-01-02 PROCEDURE — 2500000001 HC RX 250 WO HCPCS SELF ADMINISTERED DRUGS (ALT 637 FOR MEDICARE OP): Performed by: ORTHOPAEDIC SURGERY

## 2024-01-02 PROCEDURE — C1713 ANCHOR/SCREW BN/BN,TIS/BN: HCPCS | Performed by: ORTHOPAEDIC SURGERY

## 2024-01-02 PROCEDURE — 73501 X-RAY EXAM HIP UNI 1 VIEW: CPT | Mod: RT

## 2024-01-02 PROCEDURE — A27245 PR OPEN FIX INTER/SUBTROCH FX,IMPLNT: Performed by: STUDENT IN AN ORGANIZED HEALTH CARE EDUCATION/TRAINING PROGRAM

## 2024-01-02 PROCEDURE — 2500000005 HC RX 250 GENERAL PHARMACY W/O HCPCS: Performed by: ANESTHESIOLOGIST ASSISTANT

## 2024-01-02 PROCEDURE — 99100 ANES PT EXTEME AGE<1 YR&>70: CPT | Performed by: STUDENT IN AN ORGANIZED HEALTH CARE EDUCATION/TRAINING PROGRAM

## 2024-01-02 PROCEDURE — 2500000001 HC RX 250 WO HCPCS SELF ADMINISTERED DRUGS (ALT 637 FOR MEDICARE OP)

## 2024-01-02 PROCEDURE — 2500000004 HC RX 250 GENERAL PHARMACY W/ HCPCS (ALT 636 FOR OP/ED): Performed by: PHYSICIAN ASSISTANT

## 2024-01-02 PROCEDURE — 7100000001 HC RECOVERY ROOM TIME - INITIAL BASE CHARGE: Performed by: ORTHOPAEDIC SURGERY

## 2024-01-02 PROCEDURE — 96372 THER/PROPH/DIAG INJ SC/IM: CPT | Performed by: ORTHOPAEDIC SURGERY

## 2024-01-02 PROCEDURE — 2720000007 HC OR 272 NO HCPCS: Performed by: ORTHOPAEDIC SURGERY

## 2024-01-02 PROCEDURE — 83550 IRON BINDING TEST: CPT

## 2024-01-02 PROCEDURE — 85027 COMPLETE CBC AUTOMATED: CPT

## 2024-01-02 PROCEDURE — 3600000009 HC OR TIME - EACH INCREMENTAL 1 MINUTE - PROCEDURE LEVEL FOUR: Performed by: ORTHOPAEDIC SURGERY

## 2024-01-02 PROCEDURE — 3600000004 HC OR TIME - INITIAL BASE CHARGE - PROCEDURE LEVEL FOUR: Performed by: ORTHOPAEDIC SURGERY

## 2024-01-02 PROCEDURE — 76000 FLUOROSCOPY <1 HR PHYS/QHP: CPT

## 2024-01-02 PROCEDURE — 2780000003 HC OR 278 NO HCPCS: Performed by: ORTHOPAEDIC SURGERY

## 2024-01-02 PROCEDURE — 36415 COLL VENOUS BLD VENIPUNCTURE: CPT | Performed by: INTERNAL MEDICINE

## 2024-01-02 PROCEDURE — 85610 PROTHROMBIN TIME: CPT

## 2024-01-02 PROCEDURE — 3700000002 HC GENERAL ANESTHESIA TIME - EACH INCREMENTAL 1 MINUTE: Performed by: ORTHOPAEDIC SURGERY

## 2024-01-02 DEVICE — TROCHANTERIC NAIL KIT, TI
Type: IMPLANTABLE DEVICE | Site: HIP | Status: FUNCTIONAL
Brand: GAMMA

## 2024-01-02 DEVICE — LAG SCREW, TI
Type: IMPLANTABLE DEVICE | Site: HIP | Status: FUNCTIONAL
Brand: GAMMA

## 2024-01-02 DEVICE — LOCKING SCREW, FULLY THREADED: Type: IMPLANTABLE DEVICE | Site: HIP | Status: FUNCTIONAL

## 2024-01-02 RX ORDER — CEFAZOLIN SODIUM 2 G/100ML
2 INJECTION, SOLUTION INTRAVENOUS ONCE
Status: COMPLETED | OUTPATIENT
Start: 2024-01-02 | End: 2024-01-02

## 2024-01-02 RX ORDER — ONDANSETRON HYDROCHLORIDE 2 MG/ML
4 INJECTION, SOLUTION INTRAVENOUS ONCE AS NEEDED
Status: DISCONTINUED | OUTPATIENT
Start: 2024-01-02 | End: 2024-01-02 | Stop reason: HOSPADM

## 2024-01-02 RX ORDER — ROCURONIUM BROMIDE 50 MG/5 ML
SYRINGE (ML) INTRAVENOUS AS NEEDED
Status: DISCONTINUED | OUTPATIENT
Start: 2024-01-02 | End: 2024-01-02

## 2024-01-02 RX ORDER — ENOXAPARIN SODIUM 100 MG/ML
40 INJECTION SUBCUTANEOUS DAILY
Status: DISCONTINUED | OUTPATIENT
Start: 2024-01-02 | End: 2024-01-08 | Stop reason: HOSPADM

## 2024-01-02 RX ORDER — CEFAZOLIN SODIUM 2 G/100ML
2 INJECTION, SOLUTION INTRAVENOUS EVERY 8 HOURS
Status: COMPLETED | OUTPATIENT
Start: 2024-01-02 | End: 2024-01-03

## 2024-01-02 RX ORDER — ENOXAPARIN SODIUM 100 MG/ML
40 INJECTION SUBCUTANEOUS DAILY
Status: CANCELLED | OUTPATIENT
Start: 2024-01-03 | Stop reason: SDUPTHER

## 2024-01-02 RX ORDER — LIDOCAINE HYDROCHLORIDE 20 MG/ML
INJECTION, SOLUTION INFILTRATION; PERINEURAL AS NEEDED
Status: DISCONTINUED | OUTPATIENT
Start: 2024-01-02 | End: 2024-01-02

## 2024-01-02 RX ORDER — FENTANYL CITRATE 50 UG/ML
50 INJECTION, SOLUTION INTRAMUSCULAR; INTRAVENOUS EVERY 5 MIN PRN
Status: DISCONTINUED | OUTPATIENT
Start: 2024-01-02 | End: 2024-01-02 | Stop reason: HOSPADM

## 2024-01-02 RX ORDER — DEXAMETHASONE SODIUM PHOSPHATE 4 MG/ML
INJECTION, SOLUTION INTRA-ARTICULAR; INTRALESIONAL; INTRAMUSCULAR; INTRAVENOUS; SOFT TISSUE AS NEEDED
Status: DISCONTINUED | OUTPATIENT
Start: 2024-01-02 | End: 2024-01-02

## 2024-01-02 RX ORDER — PROPOFOL 10 MG/ML
INJECTION, EMULSION INTRAVENOUS AS NEEDED
Status: DISCONTINUED | OUTPATIENT
Start: 2024-01-02 | End: 2024-01-02

## 2024-01-02 RX ORDER — POTASSIUM CHLORIDE 14.9 MG/ML
20 INJECTION INTRAVENOUS
Status: DISCONTINUED | OUTPATIENT
Start: 2024-01-02 | End: 2024-01-02

## 2024-01-02 RX ORDER — HYDROMORPHONE HYDROCHLORIDE 1 MG/ML
0.5 INJECTION, SOLUTION INTRAMUSCULAR; INTRAVENOUS; SUBCUTANEOUS EVERY 5 MIN PRN
Status: DISCONTINUED | OUTPATIENT
Start: 2024-01-02 | End: 2024-01-02 | Stop reason: HOSPADM

## 2024-01-02 RX ORDER — MAGNESIUM SULFATE HEPTAHYDRATE 40 MG/ML
2 INJECTION, SOLUTION INTRAVENOUS ONCE
Status: COMPLETED | OUTPATIENT
Start: 2024-01-02 | End: 2024-01-02

## 2024-01-02 RX ORDER — OXYCODONE HYDROCHLORIDE 5 MG/1
5 TABLET ORAL EVERY 6 HOURS PRN
Status: DISCONTINUED | OUTPATIENT
Start: 2024-01-02 | End: 2024-01-03 | Stop reason: ALTCHOICE

## 2024-01-02 RX ORDER — LABETALOL HYDROCHLORIDE 5 MG/ML
5 INJECTION, SOLUTION INTRAVENOUS ONCE AS NEEDED
Status: DISCONTINUED | OUTPATIENT
Start: 2024-01-02 | End: 2024-01-02 | Stop reason: HOSPADM

## 2024-01-02 RX ORDER — ALBUTEROL SULFATE 0.83 MG/ML
2.5 SOLUTION RESPIRATORY (INHALATION) ONCE AS NEEDED
Status: DISCONTINUED | OUTPATIENT
Start: 2024-01-02 | End: 2024-01-02 | Stop reason: HOSPADM

## 2024-01-02 RX ORDER — SODIUM CHLORIDE, SODIUM LACTATE, POTASSIUM CHLORIDE, CALCIUM CHLORIDE 600; 310; 30; 20 MG/100ML; MG/100ML; MG/100ML; MG/100ML
100 INJECTION, SOLUTION INTRAVENOUS CONTINUOUS
Status: DISCONTINUED | OUTPATIENT
Start: 2024-01-02 | End: 2024-01-02 | Stop reason: HOSPADM

## 2024-01-02 RX ORDER — POTASSIUM CHLORIDE 14.9 MG/ML
20 INJECTION INTRAVENOUS
Status: ACTIVE | OUTPATIENT
Start: 2024-01-02 | End: 2024-01-02

## 2024-01-02 RX ORDER — LIDOCAINE HYDROCHLORIDE 10 MG/ML
0.1 INJECTION, SOLUTION EPIDURAL; INFILTRATION; INTRACAUDAL; PERINEURAL ONCE
Status: DISCONTINUED | OUTPATIENT
Start: 2024-01-02 | End: 2024-01-02 | Stop reason: HOSPADM

## 2024-01-02 RX ORDER — OXYCODONE HYDROCHLORIDE 5 MG/1
5 TABLET ORAL EVERY 4 HOURS PRN
Status: DISCONTINUED | OUTPATIENT
Start: 2024-01-02 | End: 2024-01-02 | Stop reason: HOSPADM

## 2024-01-02 RX ORDER — HYDRALAZINE HYDROCHLORIDE 20 MG/ML
5 INJECTION INTRAMUSCULAR; INTRAVENOUS EVERY 30 MIN PRN
Status: DISCONTINUED | OUTPATIENT
Start: 2024-01-02 | End: 2024-01-02 | Stop reason: HOSPADM

## 2024-01-02 RX ORDER — NALOXONE HYDROCHLORIDE 0.4 MG/ML
0.2 INJECTION, SOLUTION INTRAMUSCULAR; INTRAVENOUS; SUBCUTANEOUS EVERY 5 MIN PRN
Status: DISCONTINUED | OUTPATIENT
Start: 2024-01-02 | End: 2024-01-08 | Stop reason: HOSPADM

## 2024-01-02 RX ORDER — POLYETHYLENE GLYCOL 3350 17 G/17G
17 POWDER, FOR SOLUTION ORAL DAILY
Status: DISCONTINUED | OUTPATIENT
Start: 2024-01-02 | End: 2024-01-02 | Stop reason: SDUPTHER

## 2024-01-02 RX ORDER — CALCIUM GLUCONATE 20 MG/ML
1 INJECTION, SOLUTION INTRAVENOUS ONCE
Status: COMPLETED | OUTPATIENT
Start: 2024-01-02 | End: 2024-01-02

## 2024-01-02 RX ORDER — ACETAMINOPHEN 325 MG/1
975 TABLET ORAL 3 TIMES DAILY
Status: DISCONTINUED | OUTPATIENT
Start: 2024-01-02 | End: 2024-01-08 | Stop reason: HOSPADM

## 2024-01-02 RX ORDER — FENTANYL CITRATE 50 UG/ML
INJECTION, SOLUTION INTRAMUSCULAR; INTRAVENOUS AS NEEDED
Status: DISCONTINUED | OUTPATIENT
Start: 2024-01-02 | End: 2024-01-02

## 2024-01-02 RX ADMIN — SUGAMMADEX 100 MG: 100 INJECTION, SOLUTION INTRAVENOUS at 16:05

## 2024-01-02 RX ADMIN — CALCIUM GLUCONATE 1 G: 20 INJECTION, SOLUTION INTRAVENOUS at 14:58

## 2024-01-02 RX ADMIN — Medication 2 L/MIN: at 18:45

## 2024-01-02 RX ADMIN — METOPROLOL SUCCINATE 25 MG: 25 TABLET, EXTENDED RELEASE ORAL at 22:09

## 2024-01-02 RX ADMIN — PRAVASTATIN SODIUM 20 MG: 20 TABLET ORAL at 22:10

## 2024-01-02 RX ADMIN — PROPOFOL 100 MG: 10 INJECTION, EMULSION INTRAVENOUS at 14:42

## 2024-01-02 RX ADMIN — DORZOLAMIDE HYDROCHLORIDE AND TIMOLOL MALEATE 1 DROP: 20; 5 SOLUTION/ DROPS OPHTHALMIC at 22:09

## 2024-01-02 RX ADMIN — AMLODIPINE BESYLATE 5 MG: 5 TABLET ORAL at 22:10

## 2024-01-02 RX ADMIN — LIDOCAINE HYDROCHLORIDE 100 MG: 20 INJECTION, SOLUTION INFILTRATION; PERINEURAL at 14:34

## 2024-01-02 RX ADMIN — SUGAMMADEX 100 MG: 100 INJECTION, SOLUTION INTRAVENOUS at 16:27

## 2024-01-02 RX ADMIN — HYDROMORPHONE HYDROCHLORIDE 0.2 MG: 1 INJECTION, SOLUTION INTRAMUSCULAR; INTRAVENOUS; SUBCUTANEOUS at 16:10

## 2024-01-02 RX ADMIN — ACETAMINOPHEN 975 MG: 325 TABLET ORAL at 22:08

## 2024-01-02 RX ADMIN — MAGNESIUM SULFATE HEPTAHYDRATE 2 G: 40 INJECTION, SOLUTION INTRAVENOUS at 11:51

## 2024-01-02 RX ADMIN — ONDANSETRON 4 MG: 2 INJECTION, SOLUTION INTRAMUSCULAR; INTRAVENOUS at 16:03

## 2024-01-02 RX ADMIN — CHOLECALCIFEROL TAB 25 MCG (1000 UNIT) 2000 UNITS: 25 TAB at 22:10

## 2024-01-02 RX ADMIN — HYDROMORPHONE HYDROCHLORIDE 0.2 MG: 1 INJECTION, SOLUTION INTRAMUSCULAR; INTRAVENOUS; SUBCUTANEOUS at 16:33

## 2024-01-02 RX ADMIN — SODIUM CHLORIDE, SODIUM LACTATE, POTASSIUM CHLORIDE, AND CALCIUM CHLORIDE: 600; 310; 30; 20 INJECTION, SOLUTION INTRAVENOUS at 14:25

## 2024-01-02 RX ADMIN — DORZOLAMIDE HYDROCHLORIDE AND TIMOLOL MALEATE 1 DROP: 20; 5 SOLUTION/ DROPS OPHTHALMIC at 08:52

## 2024-01-02 RX ADMIN — DEXAMETHASONE SODIUM PHOSPHATE 4 MG: 4 INJECTION INTRA-ARTICULAR; INTRALESIONAL; INTRAMUSCULAR; INTRAVENOUS; SOFT TISSUE at 14:43

## 2024-01-02 RX ADMIN — Medication 50 MG: at 14:42

## 2024-01-02 RX ADMIN — FENTANYL CITRATE 50 MCG: 50 INJECTION, SOLUTION INTRAMUSCULAR; INTRAVENOUS at 15:12

## 2024-01-02 RX ADMIN — METOPROLOL SUCCINATE 25 MG: 25 TABLET, EXTENDED RELEASE ORAL at 08:52

## 2024-01-02 RX ADMIN — CEFAZOLIN SODIUM 2 G: 2 INJECTION, SOLUTION INTRAVENOUS at 22:09

## 2024-01-02 RX ADMIN — FENTANYL CITRATE 50 MCG: 50 INJECTION, SOLUTION INTRAMUSCULAR; INTRAVENOUS at 14:34

## 2024-01-02 RX ADMIN — ENOXAPARIN SODIUM 40 MG: 40 INJECTION SUBCUTANEOUS at 22:09

## 2024-01-02 RX ADMIN — CEFAZOLIN SODIUM 2 G: 2 INJECTION, SOLUTION INTRAVENOUS at 14:40

## 2024-01-02 RX ADMIN — POTASSIUM CHLORIDE 20 MEQ: 14.9 INJECTION, SOLUTION INTRAVENOUS at 09:32

## 2024-01-02 ASSESSMENT — COGNITIVE AND FUNCTIONAL STATUS - GENERAL
DRESSING REGULAR LOWER BODY CLOTHING: A LOT
HELP NEEDED FOR BATHING: A LITTLE
MOBILITY SCORE: 13
TURNING FROM BACK TO SIDE WHILE IN FLAT BAD: A LOT
MOVING TO AND FROM BED TO CHAIR: A LOT
STANDING UP FROM CHAIR USING ARMS: A LOT
MOVING FROM LYING ON BACK TO SITTING ON SIDE OF FLAT BED WITH BEDRAILS: A LITTLE
TOILETING: A LITTLE
DAILY ACTIVITIY SCORE: 18
CLIMB 3 TO 5 STEPS WITH RAILING: A LOT
DRESSING REGULAR UPPER BODY CLOTHING: A LITTLE
WALKING IN HOSPITAL ROOM: A LOT
PERSONAL GROOMING: A LITTLE

## 2024-01-02 ASSESSMENT — PAIN SCALES - GENERAL
PAINLEVEL_OUTOF10: 0 - NO PAIN

## 2024-01-02 ASSESSMENT — PAIN - FUNCTIONAL ASSESSMENT
PAIN_FUNCTIONAL_ASSESSMENT: 0-10

## 2024-01-02 NOTE — CARE PLAN
Skin care plan:       Problem: Skin  Goal: Participates in plan/prevention/treatment measures  Recent Flowsheet Documentation  Taken 1/1/2024 2256 by Erwin Madera RN  Participates in plan/prevention/treatment measures: Elevate heels  1/1/2024 2254 by Erwin Madera RN  Outcome: Met  Goal: Prevent/manage excess moisture  Recent Flowsheet Documentation  Taken 1/1/2024 2256 by Erwin Madera RN  Prevent/manage excess moisture: Cleanse incontinence/protect with barrier cream  1/1/2024 2254 by Erwin Madera RN  Outcome: Met  Goal: Prevent/minimize sheer/friction injuries  Recent Flowsheet Documentation  Taken 1/1/2024 2256 by Erwin Madera RN  Prevent/minimize sheer/friction injuries:   Use pull sheet   HOB 30 degrees or less   Turn/reposition every 2 hours/use positioning/transfer devices  1/1/2024 2254 by Erwin Madera RN  Outcome: Met  Goal: Promote/optimize nutrition  Recent Flowsheet Documentation  Taken 1/1/2024 2256 by Erwin Madera RN  Promote/optimize nutrition:   Monitor/record intake including meals   Discuss with provider if NPO > 2 days  1/1/2024 2254 by Erwin Madera RN  Outcome: Met

## 2024-01-02 NOTE — ANESTHESIA POSTPROCEDURE EVALUATION
Patient: Estelle Jung    Procedure Summary       Date: 01/02/24 Room / Location: [facility] OR 06 / Virtual [facility] OR    Anesthesia Start: 1420 Anesthesia Stop: 1730    Procedure: Hip Fracture ORIF w/ Nail Trochanteric (Right: Hip) Diagnosis:       Closed displaced intertrochanteric fracture of right femur, initial encounter (CMS/[facility])      (Closed displaced intertrochanteric fracture of right femur, initial encounter (CMS/[facility]) [S72.141A])    Surgeons: Lui Cooper MD Responsible Provider: Aida Castellanos MD    Anesthesia Type: general ASA Status: 3            Anesthesia Type: general    Vitals Value Taken Time   /72 01/02/24 1724   Temp 36.8 01/02/24 1730   Pulse 65 01/02/24 1728   Resp 21 01/02/24 1728   SpO2 98 % 01/02/24 1728   Vitals shown include unvalidated device data.    Anesthesia Post Evaluation    Patient location during evaluation: PACU  Patient participation: complete - patient participated  Level of consciousness: awake and alert  Pain management: satisfactory to patient  Airway patency: patent  Cardiovascular status: acceptable and hemodynamically stable  Respiratory status: acceptable, face mask and spontaneous ventilation  Hydration status: acceptable  Postoperative Nausea and Vomiting: none        No notable events documented.

## 2024-01-02 NOTE — PROGRESS NOTES
01/02/24 1002   Discharge Planning   Living Arrangements Alone   Support Systems Family members   Type of Residence Private residence   Home or Post Acute Services Post acute facilities (Rehab/SNF/etc)   Type of Post Acute Facility Services Skilled nursing   Patient expects to be discharged to: [facility]   Does the patient need discharge transport arranged? Yes   RoundTrip coordination needed? Yes     Met with patient at bedside. Admitted for right femur fracture. Pt lives alone and was independent PTA with no HHC or DME. Pt still drives and is able to obtain medications. Surgery/PT/OT pending. If SNF is recommended pt would like referral sent to [facility]. SW notified of discharge plan.

## 2024-01-02 NOTE — CARE PLAN
The patient's goals for the shift include      The clinical goals for the shift include manage pain, comfort      Problem: Pain  Goal: My pain/discomfort is manageable  Outcome: Progressing     Problem: Safety  Goal: Patient will be injury free during hospitalization  Outcome: Progressing  Goal: I will remain free of falls  Outcome: Progressing     Problem: Daily Care  Goal: Daily care needs are met  Outcome: Progressing     Problem: Psychosocial Needs  Goal: Demonstrates ability to cope with hospitalization/illness  Outcome: Progressing  Goal: Collaborate with me, my family, and caregiver to identify my specific goals  Outcome: Progressing  Flowsheets (Taken 1/1/2024 2133)  Cultural Requests During Hospitalization: none  Spiritual Requests During Hospitalization: none     Problem: Discharge Barriers  Goal: My discharge needs are met  Outcome: Progressing

## 2024-01-02 NOTE — ANESTHESIA PROCEDURE NOTES
Airway  Date/Time: 1/2/2024 2:36 PM  Urgency: elective    Airway not difficult    Staffing  Performed: RED   Authorized by: Aida Castellanos MD    Performed by: RED Mujica  Patient location during procedure: OR    Indications and Patient Condition  Indications for airway management: anesthesia  Spontaneous Ventilation: absent  Sedation level: deep  Preoxygenated: yes  Patient position: sniffing  MILS maintained throughout  Mask difficulty assessment: 1 - vent by mask    Final Airway Details  Final airway type: endotracheal airway      Successful airway: ETT  Cuffed: yes   Successful intubation technique: direct laryngoscopy  Endotracheal tube insertion site: oral  Blade: Ambar  Blade size: #3  ETT size (mm): 7.0  Cormack-Lehane Classification: grade I - full view of glottis  Placement verified by: chest auscultation and capnometry   Cuff volume (mL): 6  Measured from: lips  ETT to lips (cm): 22  Number of attempts at approach: 1

## 2024-01-02 NOTE — ANESTHESIA PROCEDURE NOTES
Looks like put in refferal to [facility]   Peripheral IV  Date/Time: 1/2/2024 2:50 PM  Inserted by: RED Mujica    Placement  Needle size: 16 G  Laterality: left  Location: wrist  Local anesthetic: none  Site prep: alcohol  Technique: anatomical landmarks  Attempts: 1

## 2024-01-02 NOTE — PROGRESS NOTES
Physical Therapy                 Therapy Communication Note    Patient Name: Estelle Jung  MRN: [id number]  Today's Date: 1/2/2024     Discipline: Physical Therapy    Missed Visit Reason: PT order received, chart reviewed. Plan for surgery. Will hold PT evaluation at this time and complete post op as appropriate.     Missed Time: Attempt

## 2024-01-02 NOTE — ANESTHESIA PROCEDURE NOTES
Peripheral IV  Date/Time: 1/2/2024 2:35 PM  Inserted by: Aida Castellanos MD    Placement  Needle size: 18 G  Laterality: right  Location: hand  Local anesthetic: none  Site prep: alcohol  Technique: anatomical landmarks  Attempts: 1

## 2024-01-02 NOTE — PROGRESS NOTES
Occupational Therapy                 Therapy Communication Note    Patient Name: Estelle Jung  MRN: [id number]  Today's Date: 1/2/2024     Discipline: Occupational Therapy    Missed Visit Reason:  Chart review completed. Plan for patient to have surgery. Will hold OT evaluation and complete as appropriate post-op.     Missed Time: Attempt

## 2024-01-02 NOTE — PROGRESS NOTES
Estelle Jung is a 89 y.o. female on day 1 of admission presenting with Closed displaced intertrochanteric fracture of right femur (CMS/HCC).      Subjective   Patient had two loose bowel movement overnight. She attributes the diarrhea to her anxiety about the surgery. She feels nauseous and vomited x3 following administration of dilaudid. Patient endorses 8/10 aching pain on right intertrochanteric femur. Pt denies cp nor abdominal pain.        Objective     Last Recorded Vitals  /72 (BP Location: Left arm, Patient Position: Lying)   Pulse 73   Temp 37.1 °C (98.8 °F) (Temporal)   Resp 16   Wt 55.8 kg (123 lb) Comment: zeroed with sheet, 1 blanket, 2 pillows  SpO2 97%   Intake/Output last 3 Shifts:    Intake/Output Summary (Last 24 hours) at 1/2/2024 1111  Last data filed at 1/2/2024 0629  Gross per 24 hour   Intake 621.25 ml   Output 200 ml   Net 421.25 ml       Admission Weight  Weight: 54.4 kg (120 lb) (01/01/24 1146)    Daily Weight  01/01/24 : 55.8 kg (123 lb)    Image Results  XR femur right 2+ views  Narrative: Interpreted By:  Anjum Sahu,   STUDY:  XR FEMUR RIGHT 2+ VIEWS; ;  1/1/2024 9:25 pm      INDICATION:  Signs/Symptoms:Need full length femur films for surgical planning  purposes, prior hip and knee films reviewed.      COMPARISON:  Same-day radiographs      ACCESSION NUMBER(S):  [id number]      ORDERING CLINICIAN:  LOIS ORTA      FINDINGS:  Five views of the right femur: Similar alignment of acute comminuted  right intertrochanteric fracture with apex-anterolateral angulation  and mild impaction. Vascular calcifications. No distal femur  fracture. Diffuse osteopenia. Small knee joint effusion. Moderate  rectal stool volume.      Impression: 1. Similar alignment of comminuted intertrochanteric fracture.  2. No distal femur fracture.      Signed by: Anjum Sahu 1/2/2024 12:32 AM  Dictation workstation:   [id number]      Physical Exam  Constitutional:        Appearance: Normal appearance.   HENT:      Head: Normocephalic and atraumatic.   Eyes:      Extraocular Movements: Extraocular movements intact.      Pupils: Pupils are equal, round, and reactive to light.   Cardiovascular:      Rate and Rhythm: Normal rate and regular rhythm.   Pulmonary:      Effort: Pulmonary effort is normal.      Breath sounds: Normal breath sounds. No stridor. No wheezing or rhonchi.   Abdominal:      General: Abdomen is flat.      Palpations: Abdomen is soft.      Tenderness: There is no abdominal tenderness.   Musculoskeletal:         General: Tenderness present. Normal range of motion.      Cervical back: Normal range of motion and neck supple.      Right lower leg: No edema.      Left lower leg: No edema.      Comments: Tenderness on right hip upon palpation  Shortened and externally rotated RLE     Skin:     General: Skin is warm and dry.   Neurological:      General: No focal deficit present.      Mental Status: She is alert and oriented to person, place, and time. Mental status is at baseline.         Relevant Results    Scheduled medications  acetaminophen, 975 mg, oral, TID  amLODIPine, 5 mg, oral, q PM  [Held by provider] aspirin, 81 mg, oral, Every Mon/Wed/Fri  calcium carbonate, 1,250 mg, oral, Daily  calcium gluconate, 1 g, intravenous, Once  ceFAZolin, 2 g, intravenous, Once  cholecalciferol, 2,000 Units, oral, BID  dorzolamide-timoloL, 1 drop, Both Eyes, BID  [Held by provider] enoxaparin, 40 mg, subcutaneous, q24h  magnesium sulfate, 2 g, intravenous, Once  metoprolol succinate XL, 25 mg, oral, BID  [START ON 1/7/2024] neomycin-polymyxin-dexAMETHasone, 1 drop, Right Eye, q7 days  polyethylene glycol, 17 g, oral, Daily  potassium chloride, 20 mEq, intravenous, q2h  pravastatin, 20 mg, oral, Nightly      Continuous medications     PRN medications  PRN medications: HYDROmorphone, HYDROmorphone, hyoscyamine, ondansetron    Results for orders placed or performed during the hospital  encounter of 01/01/24 (from the past 24 hour(s))   CBC and Auto Differential   Result Value Ref Range    WBC 7.4 4.4 - 11.3 x10*3/uL    nRBC 0.0 0.0 - 0.0 /100 WBCs    RBC 4.04 4.00 - 5.20 x10*6/uL    Hemoglobin 12.7 12.0 - 16.0 g/dL    Hematocrit 39.4 36.0 - 46.0 %    MCV 98 80 - 100 fL    MCH 31.4 26.0 - 34.0 pg    MCHC 32.2 32.0 - 36.0 g/dL    RDW 12.2 11.5 - 14.5 %    Platelets 360 150 - 450 x10*3/uL    Neutrophils % 51.4 40.0 - 80.0 %    Immature Granulocytes %, Automated 0.5 0.0 - 0.9 %    Lymphocytes % 34.7 13.0 - 44.0 %    Monocytes % 11.0 2.0 - 10.0 %    Eosinophils % 2.0 0.0 - 6.0 %    Basophils % 0.4 0.0 - 2.0 %    Neutrophils Absolute 3.79 1.60 - 5.50 x10*3/uL    Immature Granulocytes Absolute, Automated 0.04 0.00 - 0.50 x10*3/uL    Lymphocytes Absolute 2.56 0.80 - 3.00 x10*3/uL    Monocytes Absolute 0.81 (H) 0.05 - 0.80 x10*3/uL    Eosinophils Absolute 0.15 0.00 - 0.40 x10*3/uL    Basophils Absolute 0.03 0.00 - 0.10 x10*3/uL   Comprehensive metabolic panel   Result Value Ref Range    Glucose 84 74 - 99 mg/dL    Sodium 135 (L) 136 - 145 mmol/L    Potassium 4.0 3.5 - 5.3 mmol/L    Chloride 100 98 - 107 mmol/L    Bicarbonate 27 21 - 32 mmol/L    Anion Gap 12 10 - 20 mmol/L    Urea Nitrogen 13 6 - 23 mg/dL    Creatinine 0.53 0.50 - 1.05 mg/dL    eGFR 87 >60 mL/min/1.73m*2    Calcium 8.8 8.6 - 10.3 mg/dL    Albumin 4.0 3.4 - 5.0 g/dL    Alkaline Phosphatase 48 33 - 136 U/L    Total Protein 6.2 (L) 6.4 - 8.2 g/dL    AST 22 9 - 39 U/L    Bilirubin, Total 0.4 0.0 - 1.2 mg/dL    ALT 15 7 - 45 U/L   Protime-INR   Result Value Ref Range    Protime 10.8 9.8 - 12.8 seconds    INR 1.0 0.9 - 1.1   Type and Screen   Result Value Ref Range    ABO TYPE O     Rh TYPE POS     ANTIBODY SCREEN NEG    CBC   Result Value Ref Range    WBC 10.5 4.4 - 11.3 x10*3/uL    nRBC 0.0 0.0 - 0.0 /100 WBCs    RBC 3.03 (L) 4.00 - 5.20 x10*6/uL    Hemoglobin 9.8 (L) 12.0 - 16.0 g/dL    Hematocrit 29.0 (L) 36.0 - 46.0 %    MCV 96 80 - 100 fL     MCH 32.3 26.0 - 34.0 pg    MCHC 33.8 32.0 - 36.0 g/dL    RDW 12.2 11.5 - 14.5 %    Platelets 249 150 - 450 x10*3/uL   Magnesium   Result Value Ref Range    Magnesium 1.66 1.60 - 2.40 mg/dL   Renal Function Panel   Result Value Ref Range    Glucose 125 (H) 74 - 99 mg/dL    Sodium 131 (L) 136 - 145 mmol/L    Potassium 3.1 (L) 3.5 - 5.3 mmol/L    Chloride 99 98 - 107 mmol/L    Bicarbonate 26 21 - 32 mmol/L    Anion Gap 9 (L) 10 - 20 mmol/L    Urea Nitrogen 11 6 - 23 mg/dL    Creatinine 0.32 (L) 0.50 - 1.05 mg/dL    eGFR >90 >60 mL/min/1.73m*2    Calcium 7.5 (L) 8.6 - 10.3 mg/dL    Phosphorus 3.2 2.5 - 4.9 mg/dL    Albumin 3.1 (L) 3.4 - 5.0 g/dL   Protime-INR   Result Value Ref Range    Protime 12.4 9.8 - 12.8 seconds    INR 1.1 0.9 - 1.1   Iron and TIBC   Result Value Ref Range    Iron 19 (L) 35 - 150 ug/dL    UIBC 212 110 - 370 ug/dL    TIBC 231 (L) 240 - 445 ug/dL    % Saturation 8 (L) 25 - 45 %   Ferritin   Result Value Ref Range    Ferritin 181 (H) 8 - 150 ng/mL   Prepare RBC: 1 Units   Result Value Ref Range    PRODUCT CODE R0678N57     Unit Number [id number]     Unit ABO O     Unit RH POS     XM INTEP COMP     Dispense Status XM     Blood Expiration Date January 17, 2024 23:59 EST     PRODUCT BLOOD TYPE 5100     UNIT VOLUME 350    VERIFY ABO/Rh Group Test   Result Value Ref Range    ABO TYPE O     Rh TYPE POS      XR femur right 2+ views    Result Date: 1/2/2024  Interpreted By:  Anjum Sahu, STUDY: XR FEMUR RIGHT 2+ VIEWS; ;  1/1/2024 9:25 pm   INDICATION: Signs/Symptoms:Need full length femur films for surgical planning purposes, prior hip and knee films reviewed.   COMPARISON: Same-day radiographs   ACCESSION NUMBER(S): [id number]   ORDERING CLINICIAN: LOIS ORTA   FINDINGS: Five views of the right femur: Similar alignment of acute comminuted right intertrochanteric fracture with apex-anterolateral angulation and mild impaction. Vascular calcifications. No distal femur fracture. Diffuse  osteopenia. Small knee joint effusion. Moderate rectal stool volume.       1. Similar alignment of comminuted intertrochanteric fracture. 2. No distal femur fracture.   Signed by: Anjum Sahu 1/2/2024 12:32 AM Dictation workstation:   [id number]    CT hip right wo IV contrast    Result Date: 1/1/2024  Interpreted By:  Mejia Rich, STUDY: CT HIP RIGHT WO IV CONTRAST;  1/1/2024 4:31 pm   INDICATION: Signs/Symptoms:Further evaluate R femoral neck fx.   COMPARISON: None.   ACCESSION NUMBER(S): [id number]   ORDERING CLINICIAN: CLAIRE CARLSON   TECHNIQUE: Helical trans axial images were obtained with additional orthogonal reconstructions with bone technique.   FINDINGS: Bony structures:  There is a comminuted and impacted intertrochanteric fracture with varus angulation. There is reticulation indicating edema and hematoma in the surrounding soft tissue. The remaining bony structures are intact.   Joint spaces:  Mild narrowing of the hip joint indicating mild osteoarthritis without osteophytosis. No significant joint effusion.   Tendons and ligaments:  Maintained as visualized.   Musculature and fascia:  Maintained.   Subcutaneous tissue:  Unremarkable without significant edema.   Vasculature:  No significant atherosclerosis.   ADDITIONAL FINDINGS: None significant       Intertrochanteric fracture.   Signed by: Mejia Rich 1/1/2024 4:56 PM Dictation workstation:   [id number]    XR chest 1 view    Result Date: 1/1/2024  Interpreted By:  Claire Woods, STUDY: XR CHEST 1 VIEW;  1/1/2024 3:40 pm   INDICATION: Signs/Symptoms:Partially visualized lung consolidation on CT C spine.   COMPARISON: 12/29/2019   ACCESSION NUMBER(S): [id number]   ORDERING CLINICIAN: CLAIRE CARLSON   FINDINGS:     The cardiomediastinal silhouette and pulmonary vasculature are within normal limits. Atherosclerotic aorta. No consolidation, pleural effusion or pneumothorax.   No fracture of the left humeral surgical neck.       No acute  cardiopulmonary process. Previously described opacities most likely represent volume averaging or atelectasis.     MACRO: None.   Signed by: Claire Woods 1/1/2024 4:23 PM Dictation workstation:   [id number]    XR hip right with pelvis when performed 2 or 3 views    Result Date: 1/1/2024  STUDY: Pelvis and Right Hip Radiographs; 01/01/2024 11:43AM INDICATION: Right hip pain post fall. COMPARISON: None Available. ACCESSION NUMBER(S): [id number] ORDERING CLINICIAN: CLAIRE CARLSON TECHNIQUE:  AP view of the pelvis and two view(s) of the right hip. FINDINGS:  PELVIS: The pelvic ring is intact.  There is a displaced and angulated fracture involving the right femoral neck.. RIGHT HIP: There is a displaced angulated fracture involving the right femoral neck.. .  There are vascular soft tissue calcifications noted..    1.  Displaced angulated right femoral neck fracture.  No definite associated dislocation.. Signed by Xavi Coy MD    XR knee right 1-2 views    Result Date: 1/1/2024  Interpreted By:  Zbigniew Christensne, STUDY: XR KNEE RIGHT 1-2 VIEWS  1/1/2024 12:42 pm   INDICATION: Signs/Symptoms:Fall, pain   COMPARISON: None.   ACCESSION NUMBER(S): [id number]   ORDERING CLINICIAN: CLAIRE CARLSON   TECHNIQUE: 2 views of the right knee including AP and lateral projections were obtained.   FINDINGS: There is no evidence of acute fracture or dislocation identified. Mild degenerative changes are seen throughout the right knee. No suprapatellar joint effusion is present.       1. No acute fracture or dislocation. 2. Degenerative changes, as described above.   MACRO: None.   Signed by: Zbigniew Christensen 1/1/2024 12:48 PM Dictation workstation:   [id number]    CT cervical spine wo IV contrast    Result Date: 1/1/2024  Interpreted By:  Carmen Barrett, STUDY: CT CERVICAL SPINE WO IV CONTRAST;  1/1/2024 12:27 pm   INDICATION: Signs/Symptoms:Fall, head injury.   COMPARISON: 10/31/2021   ACCESSION NUMBER(S): [id number]   ORDERING  CLINICIAN: CLAIRE CARLSON   TECHNIQUE: CT images were obtained through the cervical spine. Sagittal and coronal reconstructions were generated.   FINDINGS:     ALIGNMENT: No significant spondylolisthesis.   VERTEBRAE/DISC SPACES: No compression deformity or acute displaced fracture.  Diffuse osteopenia. Multilevel degenerative changes including the atlanto axial articulation, vertebral endplates and bilateral facet joints.   ADDITIONAL FINDINGS: No abnormal thickening of the prevertebral soft tissues.  Vague interstitial opacity in the medial left apex on the more caudal images. Subcentimeter hypodense nodule in the right lobe of the thyroid.       No cervical vertebral displacement or acute displaced fracture. Osteopenia and degenerative changes noted.   Partially imaged infiltrate in the medial left apex and subcentimeter hypodense nodule in the right lobe of the thyroid incidentally noted.   MACRO: None.   Signed by: Carmen Barrett 1/1/2024 12:45 PM Dictation workstation:   [id number]    CT head wo IV contrast    Result Date: 1/1/2024  Interpreted By:  Carmen Barrett, STUDY: CT HEAD WO IV CONTRAST;  1/1/2024 12:27 pm   INDICATION: Signs/Symptoms:Fall, head injury.   COMPARISON: 10/31/2021   ACCESSION NUMBER(S): [id number]   ORDERING CLINICIAN: CLAIRE CARLSON   TECHNIQUE: Unenhanced CT images of the head were obtained.   FINDINGS: The ventricles, cisterns and sulci are prominent, consistent with diffuse volume loss. There are areas of nonspecific white matter hypodensity, which are probably age related or microvascular in nature. These findings are similar to the previous exam. There is no acute intracranial hemorrhage, mass effect or midline shift. No extraaxial fluid collection.   No acute displaced calvarial fracture.   Visualized paranasal sinuses are clear.       No acute intracranial hemorrhage or mass-effect.   MACRO: None.   Signed by: Carmen Barrett 1/1/2024 12:39 PM Dictation workstation:   [id number]         Assessment/Plan      This is a 89 yo female who initially presented to the ED with complaint of right hip pain after mechanical fall.  She has significant medical history of hypertension, hyperlipidemia, osteoporosis and paroxysmal A-fib.  She has been admitted to the hospital for surgery of right intertrochanteric femur.     #Right intertrochanteric femur fracture  #Mechanical fall  Plan:  - NPO  - Orthopedics consulted  - Obtain iron panel, HgB 9.8 (12.7 yesterday)  - Correct calcium (7.5) via calcium gluconate  - Replete K to >4, Mg to >2 (Currently K 3.1, Mg 1.66)  - Pain control: acetaminophen, tramadol as needed  - Nausea: IV Zofran as needed  - LR maintenance fluids  - DVT prophylaxis: Hold Lovenox for ORIF  - PT/OT  - Cleared medically for surgery with low procedure risk    Chronic conditions:  #Paroxysmal atrial fibrillation- not on DOAC  #HTN  #HLD  #Osteoporosis  -Continue home meds as tolerated    Diet: NPO  DVT ppx: Lovenox  Fluids: LR  On Telemetry  Code Status: Full Code     Dispo: Anticipate >2 midnight stays, orthopedics consulted.        Calderon Donis, M3    I saw this patient with the above medical student and performed my own History and Physical Examination. My Subjective and Objective findings are reflected in the above documentation. The Assessment and Plan reflect my input. My Edits are included in the above documentation.    This assessment and plan has been discussed with the senior resident and attending physician.    Hola Webb MD   Internal Medicine, PGY-1 .

## 2024-01-02 NOTE — CONSULTS
Reason For Consult  Right intertrochanteric femur fracture    History Of Present Illness  Estelle Jung is a 89 y.o. female presenting with right intertrochanteric femur fracture.  Patient sustained the fracture as a result of a mechanical fall earlier today when she tripped on the wheel of a shopping cart at a convenience store.  Patient reports landing on the right side of her body and striking the left side of her body.  She had immediate pain in the right hip and was unable to bear weight.  She denies LOC at the time of the fall.  She was unable to bear weight and was brought by squad to [facility] where she was found to have the above fracture.  She denies any injuries elsewhere.  CT of the head and cervical spine were obtained in the ED and found to be negative.  At baseline the patient lives independently and does not use any assistive devices.  She has a known medical history of hyperlipidemia, orthostatic hypotension, hypertension, osteoporosis for which she was previously on bisphosphonate therapy and A-fib for which she is on ASA.  She has a history of a previously non-operatively managed right distal radius fracture and left humerus fracture which when on to heal without incident.  She denies any numbness or tingling.     Past Medical History  She has a past medical history of Personal history of diseases of the skin and subcutaneous tissue, Personal history of other endocrine, nutritional and metabolic disease (06/08/2015), and Personal history of other specified conditions.    Surgical History  She has a past surgical history that includes Other surgical history (10/09/2014); Other surgical history (10/09/2014); Other surgical history (10/09/2014); Kidney surgery (10/09/2014); and Colonoscopy (10/09/2014).     Social History  She has no history on file for tobacco use, alcohol use, and drug use.    Family History  No family history on file.     Allergies  Niacin    Review of Systems  Positive only for  "some mild nausea.  Otherwise denies fever, chills, cough, abdominal pain, nausea, vomiting, numbness, tingling sensation, or changes in bowel or urinary habits      Physical Exam  RLE MSK   Shortened and externally rotated RLE  Pain with log roll of the hip  TTP over greater trochanter  Non-TTP about the knee  Able to flex and extend knee, PF/DF foot  SILT throughout RLE    The remaining extremities were examined and found to have no TTP throughout and with normal ROM of each joint.  No pain with log roll of the contralateral hip.  Negative secondary examination.     Last Recorded Vitals  Blood pressure (!) 190/83, pulse 72, temperature 36.5 °C (97.7 °F), resp. rate 18, height 1.575 m (5' 2\"), weight 54.4 kg (120 lb), SpO2 96 %.    Relevant Results  CT hip right wo IV contrast    Result Date: 1/1/2024  Interpreted By:  Mejia Rich, STUDY: CT HIP RIGHT WO IV CONTRAST;  1/1/2024 4:31 pm   INDICATION: Signs/Symptoms:Further evaluate R femoral neck fx.   COMPARISON: None.   ACCESSION NUMBER(S): [id number]   ORDERING CLINICIAN: CLAIRE CARLSON   TECHNIQUE: Helical trans axial images were obtained with additional orthogonal reconstructions with bone technique.   FINDINGS: Bony structures:  There is a comminuted and impacted intertrochanteric fracture with varus angulation. There is reticulation indicating edema and hematoma in the surrounding soft tissue. The remaining bony structures are intact.   Joint spaces:  Mild narrowing of the hip joint indicating mild osteoarthritis without osteophytosis. No significant joint effusion.   Tendons and ligaments:  Maintained as visualized.   Musculature and fascia:  Maintained.   Subcutaneous tissue:  Unremarkable without significant edema.   Vasculature:  No significant atherosclerosis.   ADDITIONAL FINDINGS: None significant       Intertrochanteric fracture.   Signed by: Mejia Rich 1/1/2024 4:56 PM Dictation workstation:   [id number]    XR chest 1 view    Result Date: " 1/1/2024  Interpreted By:  Claire Woods, STUDY: XR CHEST 1 VIEW;  1/1/2024 3:40 pm   INDICATION: Signs/Symptoms:Partially visualized lung consolidation on CT C spine.   COMPARISON: 12/29/2019   ACCESSION NUMBER(S): [id number]   ORDERING CLINICIAN: CLAIRE CARLSON   FINDINGS:     The cardiomediastinal silhouette and pulmonary vasculature are within normal limits. Atherosclerotic aorta. No consolidation, pleural effusion or pneumothorax.   No fracture of the left humeral surgical neck.       No acute cardiopulmonary process. Previously described opacities most likely represent volume averaging or atelectasis.     MACRO: None.   Signed by: Claire Woods 1/1/2024 4:23 PM Dictation workstation:   [id number]    XR hip right with pelvis when performed 2 or 3 views    Result Date: 1/1/2024  STUDY: Pelvis and Right Hip Radiographs; 01/01/2024 11:43AM INDICATION: Right hip pain post fall. COMPARISON: None Available. ACCESSION NUMBER(S): [id number] ORDERING CLINICIAN: CLAIRE CARLSON TECHNIQUE:  AP view of the pelvis and two view(s) of the right hip. FINDINGS:  PELVIS: The pelvic ring is intact.  There is a displaced and angulated fracture involving the right femoral neck.. RIGHT HIP: There is a displaced angulated fracture involving the right femoral neck.. .  There are vascular soft tissue calcifications noted..    1.  Displaced angulated right femoral neck fracture.  No definite associated dislocation.. Signed by Xavi Coy MD    Per my interpretation, fracture appears to be more consistent with an intertrochanteric femur fracture involving portions of the greater and lesser trochanter.      XR knee right 1-2 views    Result Date: 1/1/2024  Interpreted By:  Zbigniew Christensen, STUDY: XR KNEE RIGHT 1-2 VIEWS  1/1/2024 12:42 pm   INDICATION: Signs/Symptoms:Fall, pain   COMPARISON: None.   ACCESSION NUMBER(S): [id number]   ORDERING CLINICIAN: CLAIRE CARLSON   TECHNIQUE: 2 views of the right knee including AP and lateral  projections were obtained.   FINDINGS: There is no evidence of acute fracture or dislocation identified. Mild degenerative changes are seen throughout the right knee. No suprapatellar joint effusion is present.       1. No acute fracture or dislocation. 2. Degenerative changes, as described above.   MACRO: None.   Signed by: Zbigniew Christensen 1/1/2024 12:48 PM Dictation workstation:   [id number]    CT cervical spine wo IV contrast    Result Date: 1/1/2024  Interpreted By:  Carmen Barrett, STUDY: CT CERVICAL SPINE WO IV CONTRAST;  1/1/2024 12:27 pm   INDICATION: Signs/Symptoms:Fall, head injury.   COMPARISON: 10/31/2021   ACCESSION NUMBER(S): [id number]   ORDERING CLINICIAN: CLAIRE CARLSON   TECHNIQUE: CT images were obtained through the cervical spine. Sagittal and coronal reconstructions were generated.   FINDINGS:     ALIGNMENT: No significant spondylolisthesis.   VERTEBRAE/DISC SPACES: No compression deformity or acute displaced fracture.  Diffuse osteopenia. Multilevel degenerative changes including the atlanto axial articulation, vertebral endplates and bilateral facet joints.   ADDITIONAL FINDINGS: No abnormal thickening of the prevertebral soft tissues.  Vague interstitial opacity in the medial left apex on the more caudal images. Subcentimeter hypodense nodule in the right lobe of the thyroid.       No cervical vertebral displacement or acute displaced fracture. Osteopenia and degenerative changes noted.   Partially imaged infiltrate in the medial left apex and subcentimeter hypodense nodule in the right lobe of the thyroid incidentally noted.   MACRO: None.   Signed by: Carmen Barrett 1/1/2024 12:45 PM Dictation workstation:   [id number]    CT head wo IV contrast    Result Date: 1/1/2024  Interpreted By:  Carmen Barrett, STUDY: CT HEAD WO IV CONTRAST;  1/1/2024 12:27 pm   INDICATION: Signs/Symptoms:Fall, head injury.   COMPARISON: 10/31/2021   ACCESSION NUMBER(S): [id number]   ORDERING CLINICIAN: CLAIRE CARLSON    TECHNIQUE: Unenhanced CT images of the head were obtained.   FINDINGS: The ventricles, cisterns and sulci are prominent, consistent with diffuse volume loss. There are areas of nonspecific white matter hypodensity, which are probably age related or microvascular in nature. These findings are similar to the previous exam. There is no acute intracranial hemorrhage, mass effect or midline shift. No extraaxial fluid collection.   No acute displaced calvarial fracture.   Visualized paranasal sinuses are clear.       No acute intracranial hemorrhage or mass-effect.   MACRO: None.   Signed by: Carmen Barrett 1/1/2024 12:39 PM Dictation workstation:   [id number]       Scheduled medications  amLODIPine, 5 mg, oral, q PM  aspirin, 81 mg, oral, Every Mon/Wed/Fri  calcium carbonate, 1,500 mg, oral, Daily  cholecalciferol, 2,000 Units, oral, BID  dorzolamide-timoloL, 1 drop, Both Eyes, BID  enoxaparin, 40 mg, subcutaneous, q24h  metoprolol succinate XL, 25 mg, oral, BID  neomycin-polymyxin-dexAMETHasone, 1 drop, Right Eye, q7 days  polyethylene glycol, 17 g, oral, Daily  pravastatin, 20 mg, oral, Nightly      Continuous medications  lactated Ringer's, 75 mL/hr      PRN medications  PRN medications: acetaminophen, HYDROmorphone, HYDROmorphone, hyoscyamine, ondansetron  Results for orders placed or performed during the hospital encounter of 01/01/24 (from the past 24 hour(s))   CBC and Auto Differential   Result Value Ref Range    WBC 7.4 4.4 - 11.3 x10*3/uL    nRBC 0.0 0.0 - 0.0 /100 WBCs    RBC 4.04 4.00 - 5.20 x10*6/uL    Hemoglobin 12.7 12.0 - 16.0 g/dL    Hematocrit 39.4 36.0 - 46.0 %    MCV 98 80 - 100 fL    MCH 31.4 26.0 - 34.0 pg    MCHC 32.2 32.0 - 36.0 g/dL    RDW 12.2 11.5 - 14.5 %    Platelets 360 150 - 450 x10*3/uL    Neutrophils % 51.4 40.0 - 80.0 %    Immature Granulocytes %, Automated 0.5 0.0 - 0.9 %    Lymphocytes % 34.7 13.0 - 44.0 %    Monocytes % 11.0 2.0 - 10.0 %    Eosinophils % 2.0 0.0 - 6.0 %    Basophils %  0.4 0.0 - 2.0 %    Neutrophils Absolute 3.79 1.60 - 5.50 x10*3/uL    Immature Granulocytes Absolute, Automated 0.04 0.00 - 0.50 x10*3/uL    Lymphocytes Absolute 2.56 0.80 - 3.00 x10*3/uL    Monocytes Absolute 0.81 (H) 0.05 - 0.80 x10*3/uL    Eosinophils Absolute 0.15 0.00 - 0.40 x10*3/uL    Basophils Absolute 0.03 0.00 - 0.10 x10*3/uL   Comprehensive metabolic panel   Result Value Ref Range    Glucose 84 74 - 99 mg/dL    Sodium 135 (L) 136 - 145 mmol/L    Potassium 4.0 3.5 - 5.3 mmol/L    Chloride 100 98 - 107 mmol/L    Bicarbonate 27 21 - 32 mmol/L    Anion Gap 12 10 - 20 mmol/L    Urea Nitrogen 13 6 - 23 mg/dL    Creatinine 0.53 0.50 - 1.05 mg/dL    eGFR 87 >60 mL/min/1.73m*2    Calcium 8.8 8.6 - 10.3 mg/dL    Albumin 4.0 3.4 - 5.0 g/dL    Alkaline Phosphatase 48 33 - 136 U/L    Total Protein 6.2 (L) 6.4 - 8.2 g/dL    AST 22 9 - 39 U/L    Bilirubin, Total 0.4 0.0 - 1.2 mg/dL    ALT 15 7 - 45 U/L   Protime-INR   Result Value Ref Range    Protime 10.8 9.8 - 12.8 seconds    INR 1.0 0.9 - 1.1   Type and Screen   Result Value Ref Range    ABO TYPE O     Rh TYPE POS     ANTIBODY SCREEN NEG         Assessment/Plan     Right intertrochanteric femur fracture    I had a long discussion with the patient today regarding her fracture and the nature of her injury.  Patient is an independent community Ambulator who does not use assistive devices who fell sustaining the above injury.  Based on the nature of this fracture I have recommended intramedullary fixation with a cephallomedullary morenita to help the patient mobilize as quickly as possible after surgery and to prevent the associated complications of prolonged immobilization associated with non-operative treatment of a hip fracture.  We discussed the nature of these fractures and the morbidity and mortality associated with these fractures in patients her age.  We reviewed the risks, benefits and alternatives to surgery as well as the expected post-operative course. Specifically   we discussed risks that included but were not limited to the risk of infection, risk of nonunion, risk of malunion, risk of loss of reduction, risk of nerve damage, risk of blood vessel damage and bleeding, risk of need for transfusion, risks of anesthesia, risks of medical complications during the perioperative period, risk of symptomatic implants, and risk of need for further surgery.  After engaging in shared decision making with the patient expressed that she wished to proceed forward with surgical intervention.  We will plan for surgery on 1/2/2024 pending preoperative optimization from the medical service.    -NWB RLE, bedrest  -Lovenox for DVT PPX while inpatient, anticipate ASA for DVT PPX at discharge  -NPO at midnight for OR tomorrow  -Will obtain full length femur films for surgical planning purposes  -Delphine-op Ancef  -PT/OT eval and treat post-op  -Medicine admission, appreciate recs  -Anticipate discharge to SNF post-op       Lui Cooper MD

## 2024-01-02 NOTE — OP NOTE
Hip Fracture ORIF w/ Nail Trochanteric (R) Operative Note     Date: 2024 - 2024  OR Location: [facility] OR    Name: Estelle Jung, : 10/5/1932, Age: 89 y.o., MRN: [id number], Sex: female    Diagnosis  Pre-op Diagnosis     * Closed displaced intertrochanteric fracture of right femur, initial encounter (CMS/[facility]) [S72.141A] Post-op Diagnosis     * Closed displaced intertrochanteric fracture of right femur, initial encounter (CMS/[facility]) [S72.141A]     Procedures  Hip Fracture ORIF w/ Nail Trochanteric  03160 - [place] TX INTER/KS/SUBTRCHNTRIC FEM FX IMED IMPLTSCREW      Surgeons      * Lui Cooper - Primary    Resident/Fellow/Other Assistant:  Surgeon(s) and Role:    Procedure Summary  Anesthesia: General  ASA: III  Anesthesia Staff: Anesthesiologist: Aida Castellanos MD  C-AA: RED Mujica  Estimated Blood Loss: 25 mL  Intra-op Medications:   Medication Name Total Dose   calcium gluconate in NS IV 1 g 1 g   ceFAZolin in dextrose (iso-os) (Ancef) IVPB 2 g 2 g              Anesthesia Record               Intraprocedure I/O Totals       None           Specimen: No specimens collected     Staff:   Circulator: Mckenna Douglas RN; Iftikhar Matias Jr., RN; Contreras Dutta RN  Scrub Person: Brenda Mireles    Drains and/or Catheters: * None in log *    Tourniquet Times: N/a        Implants:  Implants       Type Name Action Serial No.      Screw TROCH NAIL, GAM3 125D [id number] W/TI SET SCRW - [id number] Implanted      Screw LAG SCREW, GAM3 TI 10.5 X 90MM - [id number] Implanted      Joint SCREW, LOCK 5 X 32.5 F/THR T2 - [id number] Implanted               Findings: Unstable Right Intertrochanteric Femur Fracture    Indications: Estelle Jung is an 89 y.o. female who is having surgery for Closed displaced intertrochanteric fracture of right femur, initial encounter (CMS/[facility]) [S72.141A].     SURGICAL INDICATION:    This patient is 89 year old patient who had a fall from standing height when she tripped over  a shopping cart wheel, resulting in a displaced right intertrochanteric hip fracture.  I had a long conversation with the patient and family prior to surgery.  We discussed the nature of the injury. We discussed operative versus non operative treatment and risks, benefits and alternatives to each. I discussed with them that there was certainly risks associated with this injury as well as surgery.  There is a high 1-year mortality rate.  Additionally, specific to surgery there is a risk of fixation failure, need for additional surgery, wound complications, nonunion, malunion, damage to nerves and blood vessels, risk of general anesthesia and blood clots, infection etc. The patient/family is adamant that they wished to proceed as the patient is an independent community ambulator. Informed consent was obtained. The site of surgery was properly noted/marked. The patient has been actively warmed in preoperative area. Preoperative antibiotics have been ordered and given within 1 hours of incision. Venous thrombosis prophylaxis have been ordered including unilateral sequential compression device    Procedure Details:     The patient was brought to the operative suite.  Adequate general anesthesia was administered.  Surgical site marking was confirmed correct.  The patient was positioned in the supine position on the fracture table with protection of all bony prominences. Fluoroscopy was brought in and a provisional reduction was performed with traction and maneuvering the extremity with the fracture table. The operative extremity was cleansed with chlorhexidine scrub and alcohol, and substerilely draped.  The surgical site was then sterilely prepped with ChloraPrep and draped in the usual sterile fashion.  A time-out was performed to confirm administration of prophylactic antibiotics, correct surgical site, surgical and anesthetic team members, expected surgical time and blood loss, availability of surgical equipment, and  availability of blood products.  Surgical team was in agreement.      At this point, I commenced the surgical procedure.  We had previously been able to obtain anatomic restoration of the version as well as the neck shaft angle of the intertrochanteric hip fracture with our provisional reduction on the fracture table.   This was confirmed fluoroscopically on both AP and lateral images.  Sharp incision was carried through skin and soft tissue proximal to the greater trochanter.   I then obtained an acceptable start point using a guidewire.   I inserted the guidewire confirming on AP and lateral imaging start point as well as trajectory.  I then used the entry portal reamer with protection sleeve and opened the cortex proximally.  The reamer was drilled to the stop.  Reamer and guidewire was removed. The nail was placed into the proximal femur and sunk to its appropriate depth proximally and distally which was confirmed fluoroscopically.  I then made a second counterincision through the jig for a guidewire for the lag screw.  Position of guidewire was checked on both AP and lateral fluro images and judged to be acceptable. Lag screw was measured for and placed. I then obtained about 5 mm of compression with the compression tool, which resulted in near anatomic reduction of the intertrochanteric hip fracture.  The nail was locked proximally with the set screw. I confirmed that the tip apex distance of the lag screw was less than 25 mm and perfectly centered within the femoral neck and head on AP and lateral imaging.  We then let off all traction and placed a single static distal interlocking screw utilizing the jig through an additional third counterincision.       The jig was removed. Final fluoroscopic imaging was obtained, which demonstrated near anatomic restoration of the neck shaft angle as well as version and additionally, placement of the lag screw as well as appropriate nail placement.  Wounds were then  irrigated and closed with 0 vicryl, 2-0 Vicryl followed by 3-0 Stratafix to close the skin.  Sterile dressings were applied.  Drapes were removed.  Patient was then transferred to the hospital bed.  Counts were correct at the end of the procedure but had not been documented in the EMR during the surgical procedure so an additional AP image of the hip was obtained to confirm that there were no retained foreign bodies.  I interpreted no retained foreign objects and a final read was performed by the radiologist who agreed with this assessment.  Once this was done the patient was awoken from general anesthesia and transferred to the PACU in stable condition.       POSTOPERATIVE PLAN:    1. Weightbearing as tolerated, right lower extremity.   2. Ancef post-op x 2 doses.    3. Lovenox for DVT prophylaxis while inpatient, okay for ASA at discharge per protocol.       PRESENCE STATEMENT:    I was scrubbed and present for the entirety of the surgical procedure.      Complications:  None; patient tolerated the procedure well.    Disposition: PACU - hemodynamically stable.  Condition: stable         Additional Details: None    Attending Attestation: I was present and scrubbed for the entire procedure.    Lui Cooper  Phone Number: [phone]

## 2024-01-02 NOTE — PROGRESS NOTES
Referral was sent to [facility]. Will need to send therapy evals once completed.       Martinez Sr

## 2024-01-02 NOTE — SIGNIFICANT EVENT
Patient is cleared to to process with surgical procedure with low procedure risk     Results for Revised Cardiac Risk Index (Zbigniew Criteria) by QxMD    Estimated Risk of Adverse Outcome with Non-cardiac Surgery: Very Low Risk    Estimated Rate of Myocardial Infarction, Pulmonary Edema, Ventricular Fibrillation, Cardiac Arrest, or Complete Heart Block: 0.4 %      Answers calculated to formulate result:    1. High Risk Surgery? -- No  2. Coronary Artery Disease? -- No  3. Congestive Heart Failure? -- No  4. Cerebrovascular Disease? -- No  5. Diabetes Mellitus on Insulin? -- No  6. Serum Creatinine >2 mg/dl or >177 ?mol/L? -- No    Assessment and plan discussed with my attending.   Hola Webb MD   Internal Medicine, PGY-1 .

## 2024-01-03 LAB
ALBUMIN SERPL BCP-MCNC: 3 G/DL (ref 3.4–5)
ALP SERPL-CCNC: 33 U/L (ref 33–136)
ALT SERPL W P-5'-P-CCNC: 11 U/L (ref 7–45)
ANION GAP SERPL CALC-SCNC: 11 MMOL/L (ref 10–20)
AST SERPL W P-5'-P-CCNC: 20 U/L (ref 9–39)
BILIRUB SERPL-MCNC: 0.4 MG/DL (ref 0–1.2)
BUN SERPL-MCNC: 10 MG/DL (ref 6–23)
CALCIUM SERPL-MCNC: 7.8 MG/DL (ref 8.6–10.3)
CHLORIDE SERPL-SCNC: 101 MMOL/L (ref 98–107)
CO2 SERPL-SCNC: 25 MMOL/L (ref 21–32)
CREAT SERPL-MCNC: 0.42 MG/DL (ref 0.5–1.05)
ERYTHROCYTE [DISTWIDTH] IN BLOOD BY AUTOMATED COUNT: 12.4 % (ref 11.5–14.5)
GFR SERPL CREATININE-BSD FRML MDRD: >90 ML/MIN/1.73M*2
GLUCOSE SERPL-MCNC: 124 MG/DL (ref 74–99)
HCT VFR BLD AUTO: 26.7 % (ref 36–46)
HGB BLD-MCNC: 8.7 G/DL (ref 12–16)
MCH RBC QN AUTO: 31.6 PG (ref 26–34)
MCHC RBC AUTO-ENTMCNC: 32.6 G/DL (ref 32–36)
MCV RBC AUTO: 97 FL (ref 80–100)
NRBC BLD-RTO: 0 /100 WBCS (ref 0–0)
PLATELET # BLD AUTO: 230 X10*3/UL (ref 150–450)
POTASSIUM SERPL-SCNC: 3.5 MMOL/L (ref 3.5–5.3)
PROT SERPL-MCNC: 5 G/DL (ref 6.4–8.2)
RBC # BLD AUTO: 2.75 X10*6/UL (ref 4–5.2)
SODIUM SERPL-SCNC: 133 MMOL/L (ref 136–145)
WBC # BLD AUTO: 8.8 X10*3/UL (ref 4.4–11.3)

## 2024-01-03 PROCEDURE — 97161 PT EVAL LOW COMPLEX 20 MIN: CPT | Mod: GP

## 2024-01-03 PROCEDURE — 97530 THERAPEUTIC ACTIVITIES: CPT | Mod: GP

## 2024-01-03 PROCEDURE — 2500000001 HC RX 250 WO HCPCS SELF ADMINISTERED DRUGS (ALT 637 FOR MEDICARE OP): Performed by: ORTHOPAEDIC SURGERY

## 2024-01-03 PROCEDURE — 97535 SELF CARE MNGMENT TRAINING: CPT | Mod: GO | Performed by: OCCUPATIONAL THERAPIST

## 2024-01-03 PROCEDURE — 2500000004 HC RX 250 GENERAL PHARMACY W/ HCPCS (ALT 636 FOR OP/ED): Performed by: ORTHOPAEDIC SURGERY

## 2024-01-03 PROCEDURE — 99232 SBSQ HOSP IP/OBS MODERATE 35: CPT

## 2024-01-03 PROCEDURE — 36415 COLL VENOUS BLD VENIPUNCTURE: CPT | Performed by: ORTHOPAEDIC SURGERY

## 2024-01-03 PROCEDURE — 96372 THER/PROPH/DIAG INJ SC/IM: CPT | Performed by: ORTHOPAEDIC SURGERY

## 2024-01-03 PROCEDURE — 85027 COMPLETE CBC AUTOMATED: CPT | Performed by: ORTHOPAEDIC SURGERY

## 2024-01-03 PROCEDURE — 97165 OT EVAL LOW COMPLEX 30 MIN: CPT | Mod: GO | Performed by: OCCUPATIONAL THERAPIST

## 2024-01-03 PROCEDURE — 80053 COMPREHEN METABOLIC PANEL: CPT | Performed by: ORTHOPAEDIC SURGERY

## 2024-01-03 PROCEDURE — 1200000002 HC GENERAL ROOM WITH TELEMETRY DAILY

## 2024-01-03 PROCEDURE — 2500000004 HC RX 250 GENERAL PHARMACY W/ HCPCS (ALT 636 FOR OP/ED)

## 2024-01-03 RX ORDER — PANTOPRAZOLE SODIUM 20 MG/1
20 TABLET, DELAYED RELEASE ORAL
Status: DISCONTINUED | OUTPATIENT
Start: 2024-01-04 | End: 2024-01-08 | Stop reason: HOSPADM

## 2024-01-03 RX ORDER — TRAMADOL HYDROCHLORIDE 50 MG/1
50 TABLET ORAL EVERY 8 HOURS PRN
Status: DISCONTINUED | OUTPATIENT
Start: 2024-01-03 | End: 2024-01-08 | Stop reason: HOSPADM

## 2024-01-03 RX ORDER — PANTOPRAZOLE SODIUM 20 MG/1
20 TABLET, DELAYED RELEASE ORAL DAILY
Status: DISCONTINUED | OUTPATIENT
Start: 2024-01-03 | End: 2024-01-03

## 2024-01-03 RX ORDER — POTASSIUM CHLORIDE 14.9 MG/ML
20 INJECTION INTRAVENOUS
Status: COMPLETED | OUTPATIENT
Start: 2024-01-03 | End: 2024-01-03

## 2024-01-03 RX ADMIN — METOPROLOL SUCCINATE 25 MG: 25 TABLET, EXTENDED RELEASE ORAL at 21:09

## 2024-01-03 RX ADMIN — AMLODIPINE BESYLATE 5 MG: 5 TABLET ORAL at 21:09

## 2024-01-03 RX ADMIN — METOPROLOL SUCCINATE 25 MG: 25 TABLET, EXTENDED RELEASE ORAL at 08:20

## 2024-01-03 RX ADMIN — PRAVASTATIN SODIUM 20 MG: 20 TABLET ORAL at 21:09

## 2024-01-03 RX ADMIN — DORZOLAMIDE HYDROCHLORIDE AND TIMOLOL MALEATE 1 DROP: 20; 5 SOLUTION/ DROPS OPHTHALMIC at 08:21

## 2024-01-03 RX ADMIN — CEFAZOLIN SODIUM 2 G: 2 INJECTION, SOLUTION INTRAVENOUS at 06:28

## 2024-01-03 RX ADMIN — CHOLECALCIFEROL TAB 25 MCG (1000 UNIT) 2000 UNITS: 25 TAB at 21:09

## 2024-01-03 RX ADMIN — POTASSIUM CHLORIDE 20 MEQ: 14.9 INJECTION, SOLUTION INTRAVENOUS at 07:39

## 2024-01-03 RX ADMIN — POTASSIUM CHLORIDE 20 MEQ: 14.9 INJECTION, SOLUTION INTRAVENOUS at 09:30

## 2024-01-03 RX ADMIN — ACETAMINOPHEN 975 MG: 325 TABLET ORAL at 14:50

## 2024-01-03 RX ADMIN — CHOLECALCIFEROL TAB 25 MCG (1000 UNIT) 2000 UNITS: 25 TAB at 08:20

## 2024-01-03 RX ADMIN — DORZOLAMIDE HYDROCHLORIDE AND TIMOLOL MALEATE 1 DROP: 20; 5 SOLUTION/ DROPS OPHTHALMIC at 21:09

## 2024-01-03 RX ADMIN — CALCIUM 1250 MG: 500 TABLET ORAL at 08:20

## 2024-01-03 RX ADMIN — ACETAMINOPHEN 975 MG: 325 TABLET ORAL at 08:20

## 2024-01-03 RX ADMIN — ENOXAPARIN SODIUM 40 MG: 40 INJECTION SUBCUTANEOUS at 16:25

## 2024-01-03 RX ADMIN — ACETAMINOPHEN 975 MG: 325 TABLET ORAL at 21:09

## 2024-01-03 ASSESSMENT — PAIN - FUNCTIONAL ASSESSMENT
PAIN_FUNCTIONAL_ASSESSMENT: 0-10

## 2024-01-03 ASSESSMENT — COGNITIVE AND FUNCTIONAL STATUS - GENERAL
DRESSING REGULAR UPPER BODY CLOTHING: A LITTLE
DAILY ACTIVITIY SCORE: 16
WALKING IN HOSPITAL ROOM: A LOT
STANDING UP FROM CHAIR USING ARMS: A LOT
MOVING FROM LYING ON BACK TO SITTING ON SIDE OF FLAT BED WITH BEDRAILS: A LOT
MOVING TO AND FROM BED TO CHAIR: A LOT
MOBILITY SCORE: 11
CLIMB 3 TO 5 STEPS WITH RAILING: TOTAL
TURNING FROM BACK TO SIDE WHILE IN FLAT BAD: A LOT
PERSONAL GROOMING: A LITTLE
DRESSING REGULAR LOWER BODY CLOTHING: A LOT
TOILETING: A LOT
HELP NEEDED FOR BATHING: A LOT

## 2024-01-03 ASSESSMENT — PAIN SCALES - GENERAL
PAINLEVEL_OUTOF10: 0 - NO PAIN
PAINLEVEL_OUTOF10: 0 - NO PAIN
PAINLEVEL_OUTOF10: 3

## 2024-01-03 ASSESSMENT — ACTIVITIES OF DAILY LIVING (ADL)
HOME_MANAGEMENT_TIME_ENTRY: 13
ADL_ASSISTANCE: INDEPENDENT

## 2024-01-03 NOTE — PROGRESS NOTES
Estelle Jung is a 89 y.o. female on day 2 of admission presenting with Closed displaced intertrochanteric fracture of right femur (CMS/HCC).      Subjective   Patient was seen and examined today in the morning at bedside.  No acute events overnight.  Patient reported that her pain is well-controlled with Tylenol and denies any chest pain, dyspnea, palpitation, shortness of breath or change to her urinary habit.  Negative systemic review.  Patient expressed certain SNF upon discharge.       Objective     Last Recorded Vitals  BP (!) 118/45 (BP Location: Right arm, Patient Position: Lying)   Pulse 73   Temp 37.3 °C (99.1 °F) (Temporal)   Resp 16   Wt 55.8 kg (123 lb) Comment: zeroed with sheet, 1 blanket, 2 pillows  SpO2 97%   Intake/Output last 3 Shifts:    Intake/Output Summary (Last 24 hours) at 1/3/2024 1313  Last data filed at 1/3/2024 1130  Gross per 24 hour   Intake 1200 ml   Output 1475 ml   Net -275 ml       Admission Weight  Weight: 54.4 kg (120 lb) (01/01/24 1146)    Daily Weight  01/01/24 : 55.8 kg (123 lb)      Physical Exam:  General: Not in acute distress, alert  HEENT: PERRLA, head intact and normocephalic  Neck: Normal to inspection  Lungs: Clear to auscultation, work of breathing within normal limit  Cardiac: Regular rate and rhythm  Abdomen: Soft nontender, positive bowel sounds  : Exam deferred  Skin: Intact  Hematology: No petechia or excessive ecchymosis  Musculoskeletal: Right hip status post ORIF.  Limited range of motion, dressing in place with no bleeding or shortening.  Neurological: Alert awake oriented, no focal deficit, cranial nerves grossly intact  Psych: No suicidal ideation or homicidal ideation    Relevant Results  Scheduled medications  acetaminophen, 975 mg, oral, TID  amLODIPine, 5 mg, oral, q PM  [Held by provider] aspirin, 81 mg, oral, Every Mon/Wed/Fri  calcium carbonate, 1,250 mg, oral, Daily  cholecalciferol, 2,000 Units, oral, BID  dorzolamide-timoloL, 1 drop,  Both Eyes, BID  enoxaparin, 40 mg, subcutaneous, Daily  metoprolol succinate XL, 25 mg, oral, BID  [START ON 1/7/2024] neomycin-polymyxin-dexAMETHasone, 1 drop, Right Eye, q7 days  [START ON 1/4/2024] pantoprazole, 20 mg, oral, Daily before breakfast  polyethylene glycol, 17 g, oral, Daily  pravastatin, 20 mg, oral, Nightly      Continuous medications  oxygen, 2 L/min      PRN medications  PRN medications: hyoscyamine, naloxone, ondansetron, traMADol     Results for orders placed or performed during the hospital encounter of 01/01/24 (from the past 24 hour(s))   CBC   Result Value Ref Range    WBC 8.8 4.4 - 11.3 x10*3/uL    nRBC 0.0 0.0 - 0.0 /100 WBCs    RBC 2.75 (L) 4.00 - 5.20 x10*6/uL    Hemoglobin 8.7 (L) 12.0 - 16.0 g/dL    Hematocrit 26.7 (L) 36.0 - 46.0 %    MCV 97 80 - 100 fL    MCH 31.6 26.0 - 34.0 pg    MCHC 32.6 32.0 - 36.0 g/dL    RDW 12.4 11.5 - 14.5 %    Platelets 230 150 - 450 x10*3/uL   Comprehensive metabolic panel   Result Value Ref Range    Glucose 124 (H) 74 - 99 mg/dL    Sodium 133 (L) 136 - 145 mmol/L    Potassium 3.5 3.5 - 5.3 mmol/L    Chloride 101 98 - 107 mmol/L    Bicarbonate 25 21 - 32 mmol/L    Anion Gap 11 10 - 20 mmol/L    Urea Nitrogen 10 6 - 23 mg/dL    Creatinine 0.42 (L) 0.50 - 1.05 mg/dL    eGFR >90 >60 mL/min/1.73m*2    Calcium 7.8 (L) 8.6 - 10.3 mg/dL    Albumin 3.0 (L) 3.4 - 5.0 g/dL    Alkaline Phosphatase 33 33 - 136 U/L    Total Protein 5.0 (L) 6.4 - 8.2 g/dL    AST 20 9 - 39 U/L    Bilirubin, Total 0.4 0.0 - 1.2 mg/dL    ALT 11 7 - 45 U/L             FL less than 1 hour    Result Date: 1/2/2024  These images are not reportable by radiology and will not be interpreted by  Radiologists.    XR hip right with pelvis when performed 1 view    Result Date: 1/2/2024  Interpreted By:  Emily Davis, STUDY: XR HIP RIGHT WITH PELVIS WHEN PERFORMED 1 VIEW; ;  1/2/2024 4:49 pm   INDICATION: Signs/Symptoms:incorrect closing count.   COMPARISON: 01/01/2024   ACCESSION NUMBER(S):  [id number]   ORDERING CLINICIAN: LOIS ORTA   FINDINGS: Single postoperative frontal radiograph of the right hip. Interval right femoral IM nail fixation proximally with an interlocking partially threaded cannulated screw extending into the femoral head and neck fixating intertrochanteric comminuted fracture. No retained radiopaque foreign body seen. Atherosclerosis. Gas and swelling in the soft tissues and joint compatible with postoperative changes.       No unexpected retained radiopaque foreign body seen.     MACRO: None   Signed by: Emily Davis 1/2/2024 5:01 PM Dictation workstation:   [id number]    ECG 12 lead    Result Date: 1/2/2024  Sinus bradycardia Inferior infarct (cited on or before 17-FEB-2010) Abnormal ECG When compared with ECG of 29-DEC-2019 03:55, Sinus rhythm has replaced Atrial fibrillation Vent. rate has decreased BY  58 BPM    XR femur right 2+ views    Result Date: 1/2/2024  Interpreted By:  Anjum Sahu, STUDY: XR FEMUR RIGHT 2+ VIEWS; ;  1/1/2024 9:25 pm   INDICATION: Signs/Symptoms:Need full length femur films for surgical planning purposes, prior hip and knee films reviewed.   COMPARISON: Same-day radiographs   ACCESSION NUMBER(S): [id number]   ORDERING CLINICIAN: LOIS ORTA   FINDINGS: Five views of the right femur: Similar alignment of acute comminuted right intertrochanteric fracture with apex-anterolateral angulation and mild impaction. Vascular calcifications. No distal femur fracture. Diffuse osteopenia. Small knee joint effusion. Moderate rectal stool volume.       1. Similar alignment of comminuted intertrochanteric fracture. 2. No distal femur fracture.   Signed by: Anjum Sahu 1/2/2024 12:32 AM Dictation workstation:   [id number]    CT hip right wo IV contrast    Result Date: 1/1/2024  Interpreted By:  Mejia Rich, STUDY: CT HIP RIGHT WO IV CONTRAST;  1/1/2024 4:31 pm   INDICATION: Signs/Symptoms:Further evaluate R femoral neck fx.    COMPARISON: None.   ACCESSION NUMBER(S): [id number]   ORDERING CLINICIAN: CLAIRE CARLSON   TECHNIQUE: Helical trans axial images were obtained with additional orthogonal reconstructions with bone technique.   FINDINGS: Bony structures:  There is a comminuted and impacted intertrochanteric fracture with varus angulation. There is reticulation indicating edema and hematoma in the surrounding soft tissue. The remaining bony structures are intact.   Joint spaces:  Mild narrowing of the hip joint indicating mild osteoarthritis without osteophytosis. No significant joint effusion.   Tendons and ligaments:  Maintained as visualized.   Musculature and fascia:  Maintained.   Subcutaneous tissue:  Unremarkable without significant edema.   Vasculature:  No significant atherosclerosis.   ADDITIONAL FINDINGS: None significant       Intertrochanteric fracture.   Signed by: Mejia Rich 1/1/2024 4:56 PM Dictation workstation:   [id number]    XR chest 1 view    Result Date: 1/1/2024  Interpreted By:  Claire Woods, STUDY: XR CHEST 1 VIEW;  1/1/2024 3:40 pm   INDICATION: Signs/Symptoms:Partially visualized lung consolidation on CT C spine.   COMPARISON: 12/29/2019   ACCESSION NUMBER(S): [id number]   ORDERING CLINICIAN: CLAIRE CARLSON   FINDINGS:     The cardiomediastinal silhouette and pulmonary vasculature are within normal limits. Atherosclerotic aorta. No consolidation, pleural effusion or pneumothorax.   No fracture of the left humeral surgical neck.       No acute cardiopulmonary process. Previously described opacities most likely represent volume averaging or atelectasis.     MACRO: None.   Signed by: Claire Woods 1/1/2024 4:23 PM Dictation workstation:   [id number]    XR hip right with pelvis when performed 2 or 3 views    Result Date: 1/1/2024  STUDY: Pelvis and Right Hip Radiographs; 01/01/2024 11:43AM INDICATION: Right hip pain post fall. COMPARISON: None Available. ACCESSION NUMBER(S): [id number] ORDERING  CLINICIAN: CLAIRE CARLSON TECHNIQUE:  AP view of the pelvis and two view(s) of the right hip. FINDINGS:  PELVIS: The pelvic ring is intact.  There is a displaced and angulated fracture involving the right femoral neck.. RIGHT HIP: There is a displaced angulated fracture involving the right femoral neck.. .  There are vascular soft tissue calcifications noted..    1.  Displaced angulated right femoral neck fracture.  No definite associated dislocation.. Signed by Xavi Coy MD    XR knee right 1-2 views    Result Date: 1/1/2024  Interpreted By:  Zbigniew Christensen, STUDY: XR KNEE RIGHT 1-2 VIEWS  1/1/2024 12:42 pm   INDICATION: Signs/Symptoms:Fall, pain   COMPARISON: None.   ACCESSION NUMBER(S): [id number]   ORDERING CLINICIAN: CLAIRE CARLSON   TECHNIQUE: 2 views of the right knee including AP and lateral projections were obtained.   FINDINGS: There is no evidence of acute fracture or dislocation identified. Mild degenerative changes are seen throughout the right knee. No suprapatellar joint effusion is present.       1. No acute fracture or dislocation. 2. Degenerative changes, as described above.   MACRO: None.   Signed by: Zbigniew Christensen 1/1/2024 12:48 PM Dictation workstation:   [id number]    CT cervical spine wo IV contrast    Result Date: 1/1/2024  Interpreted By:  Carmen Barrett, STUDY: CT CERVICAL SPINE WO IV CONTRAST;  1/1/2024 12:27 pm   INDICATION: Signs/Symptoms:Fall, head injury.   COMPARISON: 10/31/2021   ACCESSION NUMBER(S): [id number]   ORDERING CLINICIAN: CLAIRE CARLSON   TECHNIQUE: CT images were obtained through the cervical spine. Sagittal and coronal reconstructions were generated.   FINDINGS:     ALIGNMENT: No significant spondylolisthesis.   VERTEBRAE/DISC SPACES: No compression deformity or acute displaced fracture.  Diffuse osteopenia. Multilevel degenerative changes including the atlanto axial articulation, vertebral endplates and bilateral facet joints.   ADDITIONAL FINDINGS: No abnormal thickening of  the prevertebral soft tissues.  Vague interstitial opacity in the medial left apex on the more caudal images. Subcentimeter hypodense nodule in the right lobe of the thyroid.       No cervical vertebral displacement or acute displaced fracture. Osteopenia and degenerative changes noted.   Partially imaged infiltrate in the medial left apex and subcentimeter hypodense nodule in the right lobe of the thyroid incidentally noted.   MACRO: None.   Signed by: Carmen Barrett 1/1/2024 12:45 PM Dictation workstation:   [id number]    CT head wo IV contrast    Result Date: 1/1/2024  Interpreted By:  Carmen Barrett, STUDY: CT HEAD WO IV CONTRAST;  1/1/2024 12:27 pm   INDICATION: Signs/Symptoms:Fall, head injury.   COMPARISON: 10/31/2021   ACCESSION NUMBER(S): [id number]   ORDERING CLINICIAN: CLAIRE CARLSON   TECHNIQUE: Unenhanced CT images of the head were obtained.   FINDINGS: The ventricles, cisterns and sulci are prominent, consistent with diffuse volume loss. There are areas of nonspecific white matter hypodensity, which are probably age related or microvascular in nature. These findings are similar to the previous exam. There is no acute intracranial hemorrhage, mass effect or midline shift. No extraaxial fluid collection.   No acute displaced calvarial fracture.   Visualized paranasal sinuses are clear.       No acute intracranial hemorrhage or mass-effect.   MACRO: None.   Signed by: Carmen Barrett 1/1/2024 12:39 PM Dictation workstation:   [id number]       Estelle Jung is a 89 y.o. female on day 2 of admission presenting with Closed displaced intertrochanteric fracture of right femur (CMS/HCC).  Assessment/Plan    Principal Problem:    Closed displaced intertrochanteric fracture of right femur (CMS/HCC)  Active Problems:    Closed displaced intertrochanteric fracture of right femur, initial encounter (CMS/HCC)    This is a 89 yo female who initially presented to the ED with complaint of right hip pain after mechanical  fall.  She has significant medical history of hypertension, hyperlipidemia, osteoporosis and paroxysmal A-fib.  She has been admitted to the hospital for surgery of right intertrochanteric femur.  She underwent operative on 1/2/2024 with uncomplicated postoperative course.     #Right intertrochanteric femur fracture status post open reduction and internal fixation.  #Mechanical fall  - Postop day 1.  - Hb down trended to 8.7.  -Diet regular diet  - Orthopedics consulted, appreciate recs  - Pain control: Scheduled acetaminophen, tramadol as needed  - Nausea: IV Zofran as needed  - LR maintenance fluids  - PT/OT    #Paroxysmal A-fib not on anticoagulation  -On telemetry  -Patient currently on normal sinus rhythm  -Plan and replace electrolytes as K above 4, mag above 2     #Hypocalcemia in the setting of hypoalbuminemia improved  -Initial 7.5 on 1/3.  -On 1/3 calcium is 7.8, corrected calcium is 8.6.  -Vitamin D within normal level.    #Normocytic normochromic anemia  -Hb 8.7 with BL of 12.7.  In the setting of open reduction internal fixation.  -Estimated blood loss during procedure was 25 mL  -Ferritin 181, iron level 19 with low TIBC and low transferrin saturation may suggest anemia of chronic disease.  -Will transfuse if hemoglobin drops less than 7.  -Trend CBC daily.      Chronic conditions:  #HTN  #HLD  #Osteoporosis  -Continue home meds as tolerated       DVT ppx: Resume Lovenox  Fluids: LR  On Telemetry  Code Status: Full Code  Dispo anticipate 1 to 2 days pending placement to SNF versus acute rehab.    Assessment and plan discussed with my attending.   Hola Webb MD   Internal Medicine, PGY-1 .

## 2024-01-03 NOTE — PROGRESS NOTES
Ortho updates sent in [facility]. PT/OT pending.  1531 PT/OT evals and progress notes sent in [facility]. Requested direct precert team start auth for Joshua.       Arlette Kwong RN

## 2024-01-03 NOTE — PROGRESS NOTES
"Estelle Jung is a 89 y.o. female on day 2 of admission presenting with Closed displaced intertrochanteric fracture of right femur (CMS/HCC) s/p CMN (POD 1)    Subjective   Patient doing well this morning.  Denies any pain from surgery yesterday.  Tolerating PO.  Ready to work with therapy today.  Denies any other issues.  Denies numbness/tingling.         Objective     Physical Exam  RLE   Dressings C/D/I  Able to gently flex hip and bend knee  PF/DF ankle   SILT throughout RLE  DP/PT pulse 2+      Last Recorded Vitals  Blood pressure 126/57, pulse 64, temperature 37.2 °C (99 °F), temperature source Temporal, resp. rate 16, height 1.575 m (5' 2\"), weight 55.8 kg (123 lb), SpO2 93 %.  Intake/Output last 3 Shifts:  I/O last 3 completed shifts:  In: 1821.3 (32.6 mL/kg) [I.V.:621.3 (11.1 mL/kg); IV Piggyback:1200]  Out: 675 (12.1 mL/kg) [Urine:650 (0.3 mL/kg/hr); Blood:25]  Weight: 55.8 kg     Relevant Results      Scheduled medications  acetaminophen, 975 mg, oral, TID  amLODIPine, 5 mg, oral, q PM  [Held by provider] aspirin, 81 mg, oral, Every Mon/Wed/Fri  calcium carbonate, 1,250 mg, oral, Daily  cholecalciferol, 2,000 Units, oral, BID  dorzolamide-timoloL, 1 drop, Both Eyes, BID  enoxaparin, 40 mg, subcutaneous, Daily  metoprolol succinate XL, 25 mg, oral, BID  [START ON 1/7/2024] neomycin-polymyxin-dexAMETHasone, 1 drop, Right Eye, q7 days  polyethylene glycol, 17 g, oral, Daily  potassium chloride, 20 mEq, intravenous, q2h  pravastatin, 20 mg, oral, Nightly      Continuous medications  oxygen, 2 L/min      PRN medications  PRN medications: HYDROmorphone, hyoscyamine, naloxone, ondansetron, oxyCODONE  Results for orders placed or performed during the hospital encounter of 01/01/24 (from the past 24 hour(s))   Prepare RBC: 1 Units   Result Value Ref Range    PRODUCT CODE G6503K09     Unit Number [id number]     Unit ABO O     Unit RH POS     XM INTEP COMP     Dispense Status XM     Blood Expiration Date " January 17, 2024 23:59 EST     PRODUCT BLOOD TYPE 5100     UNIT VOLUME 350    VERIFY ABO/Rh Group Test   Result Value Ref Range    ABO TYPE O     Rh TYPE POS    CBC   Result Value Ref Range    WBC 8.8 4.4 - 11.3 x10*3/uL    nRBC 0.0 0.0 - 0.0 /100 WBCs    RBC 2.75 (L) 4.00 - 5.20 x10*6/uL    Hemoglobin 8.7 (L) 12.0 - 16.0 g/dL    Hematocrit 26.7 (L) 36.0 - 46.0 %    MCV 97 80 - 100 fL    MCH 31.6 26.0 - 34.0 pg    MCHC 32.6 32.0 - 36.0 g/dL    RDW 12.4 11.5 - 14.5 %    Platelets 230 150 - 450 x10*3/uL   Comprehensive metabolic panel   Result Value Ref Range    Glucose 124 (H) 74 - 99 mg/dL    Sodium 133 (L) 136 - 145 mmol/L    Potassium 3.5 3.5 - 5.3 mmol/L    Chloride 101 98 - 107 mmol/L    Bicarbonate 25 21 - 32 mmol/L    Anion Gap 11 10 - 20 mmol/L    Urea Nitrogen 10 6 - 23 mg/dL    Creatinine 0.42 (L) 0.50 - 1.05 mg/dL    eGFR >90 >60 mL/min/1.73m*2    Calcium 7.8 (L) 8.6 - 10.3 mg/dL    Albumin 3.0 (L) 3.4 - 5.0 g/dL    Alkaline Phosphatase 33 33 - 136 U/L    Total Protein 5.0 (L) 6.4 - 8.2 g/dL    AST 20 9 - 39 U/L    Bilirubin, Total 0.4 0.0 - 1.2 mg/dL    ALT 11 7 - 45 U/L                            Assessment/Plan   Principal Problem:    Closed displaced intertrochanteric fracture of right femur (CMS/[facility])  Active Problems:    Closed displaced intertrochanteric fracture of right femur, initial encounter (CMS/[facility])    Right intertrochanteric femur fracture s/p CMN (POD 1)  -WBAT RLE  -Regular diet  -Pain control  -Post-op ancef x 2 doses  -PT/OT eval and treat   -Appreciate Medicine recommendations  -Lovenox for DVT prophylaxis while inpatient, ok to transition to ASA per protocol at discharge  -Anticipate likely discharge to SNF               I spent 30 minutes in the professional and overall care of this patient.      Lui Cooper MD       Yes

## 2024-01-03 NOTE — CARE PLAN
Patient had a quiet night resting in her room. VSS and NVI. Tolerating diet appropriately. No c/o pain throughout this shift. Voiding adequately. No acute events during this shift.     The clinical goals for the shift include Patient will verbalize pain managed 3 or less throughout this shift.

## 2024-01-03 NOTE — PROGRESS NOTES
Physical Therapy    Physical Therapy Evaluation    Patient Name: Estelle Jung  MRN: [id number]  Today's Date: 1/3/2024   Time Calculation  Start Time: 1118  Stop Time: 1155  Time Calculation (min): 37 min    Assessment/Plan   PT Assessment  PT Assessment Results: Decreased strength, Decreased range of motion, Decreased endurance, Impaired balance, Decreased mobility, Pain  Rehab Prognosis: Good  Evaluation/Treatment Tolerance: Patient tolerated treatment well  Medical Staff Made Aware: Yes  End of Session Communication: Bedside nurse  Assessment Comment: Pt presents today below baseline level of function and requires continued PT during hospital stay. Pt requires 24 hour physical assist for all mobility to prevent falls. Pt is unsafe to navigate home environment at this time with available support and assist. Pt requires PT at a high intensity level at discharge to maximize functional mobility and safety. Anticipate pt will tolerate 3 hours of therapy daily. Pt is motivated to participate and requires daily, intensive, and short term therapy to return to high prior functional level.       End of Session Patient Position: Up in chair, Alarm on  IP OR SWING BED PT PLAN  Inpatient or Swing Bed: Inpatient  PT Plan  Treatment/Interventions: Bed mobility, Transfer training, Gait training, Balance training, Neuromuscular re-education, Strengthening, Endurance training, Range of motion, Therapeutic exercise, Therapeutic activity, Home exercise program  PT Plan: Skilled PT  PT Frequency: Daily  PT Discharge Recommendations: High intensity level of continued care  Equipment Recommended upon Discharge: Wheeled walker  PT Recommended Transfer Status: Assist x2 (FWW, gait belt)  PT - OK to Discharge: Yes (To next level of care when cleared by medical team   )    Subjective       General Visit Information:  General  Reason for Referral: impaired mobility  Referred By: Jayant Rodriguez MD  Past Medical History Relevant to  Rehab: admitted 1/1/24 with right interchanteric femur fracture. 1/2 pt underwent right hip ORIF completed by Dr. Cooper. PMH: HTn, HLD, orthostatic hypotension, syncope, kidney surgery  Missed Visit: Yes  Missed Visit Reason: Patient placed on medical hold  Family/Caregiver Present: No  Co-Treatment: OT  Co-Treatment Reason: to maximize pt safety and functional mobility  Prior to Session Communication: Bedside nurse  Patient Position Received: Bed, 3 rail up, Alarm on  Preferred Learning Style: verbal  General Comment: Pt agreeable to PT, nursing cleared for treatment.    Home Living:  Home Living  Home Living Comments: Lives in duplex alone with 1 [place]. Has WIS with GB.    Prior Level of Function:  Prior Function Per Pt/Caregiver Report  ADL Assistance: Independent  Homemaking Assistance: Independent  Ambulatory Assistance: Independent  Prior Function Comments: denies any additional falls in the past 6 months    Precautions:  Precautions  LE Weight Bearing Status: Weight Bearing as Tolerated (right LE)  Medical Precautions: Fall precautions    Vital Signs:     Objective     Pain:  Pain Assessment  Pain Assessment: 0-10  Pain Score:  (0/10 pain at rest and 3/10 post session)  Pain Type: Surgical pain  Pain Location: Hip  Pain Orientation: Right    Cognition:  Cognition  Overall Cognitive Status: Within Functional Limits  Orientation Level: Oriented X4    General Assessments:      Activity Tolerance  Endurance: Endurance does not limit participation in activity  Sensation  Sensation Comment: denies numbness and tingling              Static Sitting Balance  Static Sitting-Comment/Number of Minutes: good  Dynamic Sitting Balance  Dynamic Sitting-Comments: good  Static Standing Balance  Static Standing-Comment/Number of Minutes: fair  Dynamic Standing Balance  Dynamic Standing-Comments: fair    Functional Assessments:     Bed Mobility  Bed Mobility: Yes  Bed Mobility 1  Bed Mobility 1: Supine to sitting  Level of  Assistance 1: Moderate assistance  Transfers  Transfer: Yes  Transfer 1  Transfer From 1: Sit to  Transfer to 1: Stand  Transfer Device 1: Walker  Transfer Level of Assistance 1: Moderate assistance, +2  Trials/Comments 1: x 3 trials from various heights and surfaces. pt with forward flexed posture  Transfers 2  Transfer From 2: Stand to  Transfer to 2: Sit  Transfer Device 2: Walker  Transfer Level of Assistance 2: Moderate assistance, +2  Trials/Comments 2: requires assist to extend right LE before sitting  Ambulation/Gait Training  Ambulation/Gait Training Performed: Yes  Ambulation/Gait Training 1  Device 1: Rolling walker  Gait Support Devices: Gait belt  Assistance 1: Moderate assistance (x 2)  Quality of Gait 1: Diminished heel strike, Decreased step length, Forward flexed posture  Comments/Distance (ft) 1: 2 feet x 3 of side stepping        Treatment    Verbal and tactile cues to facilitate abduction/adduction of BLE's and increased use of bed rail to transition supine<>sitting EOB. Cues for placing feet flat on floor at EOB and squaring hips to maximize safety. Cues for safe hand placement with use of FWW for sit<>stand and upright posture in standing. Instruction provided in step to gait pattern with cues given for sequencing with FWW and increased use of Ue's to offload. Cues given for turning strategy to avoid pivoting on right LE.     Instruction provided in ankle pumps, quad sets, glute sets in order to maximize strength and circulation. Cues given for proper technique and parameters.       Extremity/Trunk Assessments:        RLE   RLE : Exceptions to WFL  AROM RLE (degrees)  RLE AROM Comment: WFL except 50-75% at hip/knee  Strength RLE  RLE Overall Strength: Greater than or equal to 3/5 as evidenced by functional mobility  LLE   LLE : Within Functional Limits    Outcome Measures:  [facility] Basic Mobility  Turning from your back to your side while in a flat bed without using bedrails: A lot  Moving from  lying on your back to sitting on the side of a flat bed without using bedrails: A lot  Moving to and from bed to chair (including a wheelchair): A lot  Standing up from a chair using your arms (e.g. wheelchair or bedside chair): A lot  To walk in hospital room: A lot  Climbing 3-5 steps with railing: Total  Basic Mobility - Total Score: 11                            Goals:  Encounter Problems       Encounter Problems (Active)             PT Problem       Pt will perform bed mobility with min A.         Start:  01/03/24    Expected End:  01/17/24            Pt will complete sit <> stand and bed <> chair transfers with min A.         Start:  01/03/24    Expected End:  01/17/24            Pt will ambulate 50 feet min A using FWW with no significant gait deviations.         Start:  01/03/24    Expected End:  01/17/24            Pt will progress to completing 3 x 20 supine/seated exercises in order to increase strength and improve gait mechanics.         Start:  01/03/24    Expected End:  01/17/24                         Education Documentation  Precautions, taught by Tiffanie Priest PT at 1/3/2024  2:55 PM.  Learner: Patient  Readiness: Acceptance  Method: Explanation  Response: Verbalizes Understanding, Demonstrated Understanding    Body Mechanics, taught by Tiffanie Priest PT at 1/3/2024  2:55 PM.  Learner: Patient  Readiness: Acceptance  Method: Explanation  Response: Verbalizes Understanding, Demonstrated Understanding    Home Exercise Program, taught by Tiffanie Priest PT at 1/3/2024  2:55 PM.  Learner: Patient  Readiness: Acceptance  Method: Explanation  Response: Verbalizes Understanding, Demonstrated Understanding    Mobility Training, taught by Tiffanie Priest PT at 1/3/2024  2:55 PM.  Learner: Patient  Readiness: Acceptance  Method: Explanation  Response: Verbalizes Understanding, Demonstrated Understanding    Education Comments  No comments found.

## 2024-01-03 NOTE — PROGRESS NOTES
Occupational Therapy    Evaluation    Patient Name: Estelle Jung  MRN: [id number]  Today's Date: 1/3/2024  Time Calculation  Start Time: 1119  Stop Time: 1155  Time Calculation (min): 36 min  Eval + 1 self care    Assessment  IP OT Assessment  Prognosis: Good  Evaluation/Treatment Tolerance: Patient tolerated treatment well  Medical Staff Made Aware: Yes  End of Session Communication: Bedside nurse  End of Session Patient Position: Up in chair, Alarm on  Patient presents with decline in ADLs, functional transfers, and functional mobility tasks and would benefit from OT during acute stay to improve functional independence and safety.  Patient currently requires 24 hour hands on assistance. Recommend high intensity OT to maximize functional independence and safety.      Plan:  Treatment Interventions: ADL retraining, Functional transfer training, Patient/family training, Equipment evaluation/education, Compensatory technique education  OT Frequency: Daily  OT Discharge Recommendations: High intensity level of continued care  OT - OK to Discharge: Yes from acute care OT services to the next level of care when cleared by medical team      Subjective     Current Problem:  1. Closed displaced intertrochanteric fracture of right femur, initial encounter (CMS/[facility])  Case Request Operating Room: Hip Fracture ORIF w/ Nail Trochanteric    Case Request Operating Room: Hip Fracture ORIF w/ Nail Trochanteric          General:  General  Reason for Referral: ADLs  Referred By: Jayant Rodriguez MD  Past Medical History Relevant to Rehab: Admitted 1/1/2024 after a fall.  Xray right hip revealed displaced angulated right femoral neck fracture.  1/2/2024 patient had right hip ORIF completed by Dr Cooper.  PMH: HTN, HLD, orthostatic hypotension, syncope, kidney surgery  Missed Visit: Yes  Co-Treatment: PT  Co-Treatment Reason: for safety  Prior to Session Communication: Bedside nurse  Patient Position Received: Bed, 3 rail up, Alarm  on  Preferred Learning Style: verbal  General Comment: Patient in long legged sitting in bed and agreeable to participate in OT evaluation.  Lines: purewick, IV    Precautions:  LE Weight Bearing Status: Weight Bearing as Tolerated  Medical Precautions: Fall precautions    Pain:  Pain Assessment  Pain Assessment: 0-10  Pain Score:  (Reported 0/10 pain at rest and 3/10 right hip after activity)  Pain Type: Surgical pain  Pain Interventions: Rest    Objective   Cognition:  Overall Cognitive Status: Within Functional Limits  Orientation Level: Oriented X4    Home Living:  Home Living Comments: Lives in duplex alone with [address] [place]. Has WIS with GB     Prior Function:  Prior Function Comments: Was independent with all ADLs and IADLs.  Did not use an assistive device for functional mobility tasks    ADL:  Toileting Assistance with Device: Moderate    Activity Tolerance:  Endurance: Endurance does not limit participation in activity    Bed Mobility/Transfers:   Bed Mobility  Bed Mobility:  (mod assist supine to sit at edge of bed)  Transfers  Transfer:  (Mod assist of 2 sit <>stand from various surfaces (edge of bedside chair, and bedside commode) with max verbal cues for safety and technique. Mod assist of 2 with WW to transfer from edge of bed to bedside chair and from bedside chair <> bedside commode.)      Extremities: RUE   RUE : Within Functional Limits and LUE   LUE: Within Functional Limits (patient reports history of left humeral fracture.  Left UE ROM approximately 75%)    Outcome Measures: [facility] Daily Activity  Putting on and taking off regular lower body clothing: A lot  Bathing (including washing, rinsing, drying): A lot  Putting on and taking off regular upper body clothing: A little  Toileting, which includes using toilet, bedpan or urinal: A lot  Taking care of personal grooming such as brushing teeth: A little  Eating Meals: None  Daily Activity - Total Score: 16    Treatment:   Mod assist supine to sit at  edge of bed with use of draw sheet.  Mod assist of 2 sit to stand with min verbal cues for proper hand placement and technique. Mod assist of 2 with WW to take side steps to head of bed. Mod assist of 2 with WW to take steps to bedside chair.  Mod assist of 2 stand to sit in bedside chair. Mod assist of 2 with WW to transfer from bedside chair to bedside commode. Mod assist to complete toileting task. Mod verbal cues for safety with toilet hygiene as patient with forward flexed posture.  Patient remained seated in bedside chair with bilateral LE's elevated. Call light,  phone and tray table in reach. Chair check engaged for safety.     EDUCATION:  Education  Individual(s) Educated: Patient  Education Provided: Fall precautons  Patient Response to Education: Patient/Caregiver Verbalized Understanding of Information      Goals:   Encounter Problems       Encounter Problems (Active)       Balance       STG-Patient will be CGA with assistive device dynamic stand task >5 minutes for ADL completion   (Progressing)       Start:  01/03/24    Expected End:  01/17/24               Dressings Lower Extremities       STG - Patient will complete lower body dressing with min assist with AE and comp strategies  (Progressing)       Start:  01/03/24    Expected End:  01/17/24               Mobility       STG-Patient will be CGA with assistive device functional mobility tasks   (Progressing)       Start:  01/03/24    Expected End:  01/17/24               Transfers       STG-Patient will be CGA with functional transfers demonstrating good safety (Progressing)       Start:  01/03/24    Expected End:  01/17/24

## 2024-01-04 LAB
ALBUMIN SERPL BCP-MCNC: 2.7 G/DL (ref 3.4–5)
ALP SERPL-CCNC: 30 U/L (ref 33–136)
ALT SERPL W P-5'-P-CCNC: 9 U/L (ref 7–45)
ANION GAP SERPL CALC-SCNC: 10 MMOL/L (ref 10–20)
AST SERPL W P-5'-P-CCNC: 18 U/L (ref 9–39)
BILIRUB SERPL-MCNC: 0.3 MG/DL (ref 0–1.2)
BUN SERPL-MCNC: 16 MG/DL (ref 6–23)
CALCIUM SERPL-MCNC: 7.5 MG/DL (ref 8.6–10.3)
CHLORIDE SERPL-SCNC: 105 MMOL/L (ref 98–107)
CO2 SERPL-SCNC: 26 MMOL/L (ref 21–32)
CREAT SERPL-MCNC: 0.47 MG/DL (ref 0.5–1.05)
ERYTHROCYTE [DISTWIDTH] IN BLOOD BY AUTOMATED COUNT: 12.8 % (ref 11.5–14.5)
GFR SERPL CREATININE-BSD FRML MDRD: 90 ML/MIN/1.73M*2
GLUCOSE SERPL-MCNC: 97 MG/DL (ref 74–99)
HCT VFR BLD AUTO: 23.7 % (ref 36–46)
HGB BLD-MCNC: 7.6 G/DL (ref 12–16)
MAGNESIUM SERPL-MCNC: 1.98 MG/DL (ref 1.6–2.4)
MCH RBC QN AUTO: 31.9 PG (ref 26–34)
MCHC RBC AUTO-ENTMCNC: 32.1 G/DL (ref 32–36)
MCV RBC AUTO: 100 FL (ref 80–100)
NRBC BLD-RTO: 0 /100 WBCS (ref 0–0)
PLATELET # BLD AUTO: 238 X10*3/UL (ref 150–450)
POTASSIUM SERPL-SCNC: 3.6 MMOL/L (ref 3.5–5.3)
PROT SERPL-MCNC: 4.8 G/DL (ref 6.4–8.2)
RBC # BLD AUTO: 2.38 X10*6/UL (ref 4–5.2)
SODIUM SERPL-SCNC: 137 MMOL/L (ref 136–145)
WBC # BLD AUTO: 9.4 X10*3/UL (ref 4.4–11.3)

## 2024-01-04 PROCEDURE — 99232 SBSQ HOSP IP/OBS MODERATE 35: CPT | Performed by: PHYSICIAN ASSISTANT

## 2024-01-04 PROCEDURE — 97116 GAIT TRAINING THERAPY: CPT | Mod: GP,CQ

## 2024-01-04 PROCEDURE — 97530 THERAPEUTIC ACTIVITIES: CPT | Mod: GO,CO

## 2024-01-04 PROCEDURE — 2500000001 HC RX 250 WO HCPCS SELF ADMINISTERED DRUGS (ALT 637 FOR MEDICARE OP): Performed by: ORTHOPAEDIC SURGERY

## 2024-01-04 PROCEDURE — 1200000002 HC GENERAL ROOM WITH TELEMETRY DAILY

## 2024-01-04 PROCEDURE — 80053 COMPREHEN METABOLIC PANEL: CPT

## 2024-01-04 PROCEDURE — 2500000001 HC RX 250 WO HCPCS SELF ADMINISTERED DRUGS (ALT 637 FOR MEDICARE OP): Performed by: PHYSICIAN ASSISTANT

## 2024-01-04 PROCEDURE — 2500000004 HC RX 250 GENERAL PHARMACY W/ HCPCS (ALT 636 FOR OP/ED)

## 2024-01-04 PROCEDURE — 83735 ASSAY OF MAGNESIUM: CPT | Performed by: INTERNAL MEDICINE

## 2024-01-04 PROCEDURE — 36415 COLL VENOUS BLD VENIPUNCTURE: CPT

## 2024-01-04 PROCEDURE — 99232 SBSQ HOSP IP/OBS MODERATE 35: CPT

## 2024-01-04 PROCEDURE — 2500000004 HC RX 250 GENERAL PHARMACY W/ HCPCS (ALT 636 FOR OP/ED): Performed by: ORTHOPAEDIC SURGERY

## 2024-01-04 PROCEDURE — 97535 SELF CARE MNGMENT TRAINING: CPT | Mod: GO,CO

## 2024-01-04 PROCEDURE — 2500000001 HC RX 250 WO HCPCS SELF ADMINISTERED DRUGS (ALT 637 FOR MEDICARE OP)

## 2024-01-04 PROCEDURE — 85027 COMPLETE CBC AUTOMATED: CPT

## 2024-01-04 PROCEDURE — 97110 THERAPEUTIC EXERCISES: CPT | Mod: GP,CQ

## 2024-01-04 RX ORDER — IRON POLYSACCHARIDE COMPLEX 150 MG
150 CAPSULE ORAL DAILY
Status: DISCONTINUED | OUTPATIENT
Start: 2024-01-04 | End: 2024-01-08 | Stop reason: HOSPADM

## 2024-01-04 RX ORDER — POTASSIUM CHLORIDE 20 MEQ/1
40 TABLET, EXTENDED RELEASE ORAL ONCE
Status: COMPLETED | OUTPATIENT
Start: 2024-01-04 | End: 2024-01-04

## 2024-01-04 RX ORDER — BISACODYL 10 MG/1
10 SUPPOSITORY RECTAL DAILY PRN
Status: DISCONTINUED | OUTPATIENT
Start: 2024-01-04 | End: 2024-01-08 | Stop reason: HOSPADM

## 2024-01-04 RX ADMIN — PRAVASTATIN SODIUM 20 MG: 20 TABLET ORAL at 20:29

## 2024-01-04 RX ADMIN — ACETAMINOPHEN 975 MG: 325 TABLET ORAL at 20:29

## 2024-01-04 RX ADMIN — TRAMADOL HYDROCHLORIDE 50 MG: 50 TABLET, COATED ORAL at 09:50

## 2024-01-04 RX ADMIN — Medication 150 MG: at 09:53

## 2024-01-04 RX ADMIN — IRON SUCROSE 300 MG: 20 INJECTION, SOLUTION INTRAVENOUS at 12:53

## 2024-01-04 RX ADMIN — DORZOLAMIDE HYDROCHLORIDE AND TIMOLOL MALEATE 1 DROP: 20; 5 SOLUTION/ DROPS OPHTHALMIC at 09:00

## 2024-01-04 RX ADMIN — DORZOLAMIDE HYDROCHLORIDE AND TIMOLOL MALEATE 1 DROP: 20; 5 SOLUTION/ DROPS OPHTHALMIC at 20:29

## 2024-01-04 RX ADMIN — CALCIUM 1250 MG: 500 TABLET ORAL at 09:51

## 2024-01-04 RX ADMIN — METOPROLOL SUCCINATE 25 MG: 25 TABLET, EXTENDED RELEASE ORAL at 20:29

## 2024-01-04 RX ADMIN — ACETAMINOPHEN 975 MG: 325 TABLET ORAL at 09:50

## 2024-01-04 RX ADMIN — CHOLECALCIFEROL TAB 25 MCG (1000 UNIT) 2000 UNITS: 25 TAB at 09:51

## 2024-01-04 RX ADMIN — AMLODIPINE BESYLATE 5 MG: 5 TABLET ORAL at 20:29

## 2024-01-04 RX ADMIN — CHOLECALCIFEROL TAB 25 MCG (1000 UNIT) 2000 UNITS: 25 TAB at 20:29

## 2024-01-04 RX ADMIN — ENOXAPARIN SODIUM 40 MG: 40 INJECTION SUBCUTANEOUS at 16:23

## 2024-01-04 RX ADMIN — POTASSIUM CHLORIDE 40 MEQ: 1500 TABLET, EXTENDED RELEASE ORAL at 12:53

## 2024-01-04 RX ADMIN — METOPROLOL SUCCINATE 25 MG: 25 TABLET, EXTENDED RELEASE ORAL at 09:51

## 2024-01-04 ASSESSMENT — COGNITIVE AND FUNCTIONAL STATUS - GENERAL
CLIMB 3 TO 5 STEPS WITH RAILING: A LOT
MOVING TO AND FROM BED TO CHAIR: A LITTLE
MOVING FROM LYING ON BACK TO SITTING ON SIDE OF FLAT BED WITH BEDRAILS: A LOT
TURNING FROM BACK TO SIDE WHILE IN FLAT BAD: A LOT
TOILETING: A LITTLE
STANDING UP FROM CHAIR USING ARMS: A LITTLE
WALKING IN HOSPITAL ROOM: A LITTLE
DRESSING REGULAR LOWER BODY CLOTHING: A LOT
MOBILITY SCORE: 15
DAILY ACTIVITIY SCORE: 20
HELP NEEDED FOR BATHING: A LITTLE

## 2024-01-04 ASSESSMENT — ACTIVITIES OF DAILY LIVING (ADL): HOME_MANAGEMENT_TIME_ENTRY: 29

## 2024-01-04 ASSESSMENT — PAIN SCALES - GENERAL
PAINLEVEL_OUTOF10: 3
PAINLEVEL_OUTOF10: 6
PAINLEVEL_OUTOF10: 6
PAINLEVEL_OUTOF10: 1

## 2024-01-04 ASSESSMENT — PAIN SCALES - WONG BAKER: WONGBAKER_NUMERICALRESPONSE: HURTS LITTLE BIT

## 2024-01-04 ASSESSMENT — PAIN - FUNCTIONAL ASSESSMENT
PAIN_FUNCTIONAL_ASSESSMENT: 0-10
PAIN_FUNCTIONAL_ASSESSMENT: 0-10

## 2024-01-04 NOTE — PROGRESS NOTES
Estelle Jung is a 89 y.o. female on day 3 of admission presenting with Closed displaced intertrochanteric fracture of right femur (CMS/HCC).      Subjective   Patient was seen and examined today in the morning at bedside.  No acute events overnight.  Pain is a tolerable level negative systemic review.       Objective     Last Recorded Vitals  /63 (BP Location: Right arm, Patient Position: Lying)   Pulse 77   Temp 36 °C (96.8 °F) (Temporal)   Resp 16   Wt 55.8 kg (123 lb) Comment: zeroed with sheet, 1 blanket, 2 pillows  SpO2 99%   Intake/Output last 3 Shifts:    Intake/Output Summary (Last 24 hours) at 1/4/2024 1648  Last data filed at 1/4/2024 0841  Gross per 24 hour   Intake 120 ml   Output 550 ml   Net -430 ml         Admission Weight  Weight: 54.4 kg (120 lb) (01/01/24 1146)    Daily Weight  01/01/24 : 55.8 kg (123 lb)      Physical Exam:  General: Not in acute distress, alert  HEENT: PERRLA, head intact and normocephalic  Neck: Normal to inspection  Lungs: Clear to auscultation, work of breathing within normal limit  Cardiac: Regular rate and rhythm  Abdomen: Soft nontender, positive bowel sounds  : Exam deferred  Skin: Intact  Hematology: No petechia or excessive ecchymosis  Musculoskeletal: Right hip status post ORIF.    Neurological: Alert awake oriented, no focal deficit, cranial nerves grossly intact  Psych: No suicidal ideation or homicidal ideation    Relevant Results  Scheduled medications  acetaminophen, 975 mg, oral, TID  amLODIPine, 5 mg, oral, q PM  [Held by provider] aspirin, 81 mg, oral, Every Mon/Wed/Fri  calcium carbonate, 1,250 mg, oral, Daily  cholecalciferol, 2,000 Units, oral, BID  dorzolamide-timoloL, 1 drop, Both Eyes, BID  enoxaparin, 40 mg, subcutaneous, Daily  iron polysaccharides, 150 mg, oral, Daily  metoprolol succinate XL, 25 mg, oral, BID  [START ON 1/7/2024] neomycin-polymyxin-dexAMETHasone, 1 drop, Right Eye, q7 days  pantoprazole, 20 mg, oral, Daily before  breakfast  polyethylene glycol, 17 g, oral, Daily  pravastatin, 20 mg, oral, Nightly      Continuous medications  oxygen, 2 L/min      PRN medications  PRN medications: bisacodyl, hyoscyamine, naloxone, ondansetron, traMADol     Results for orders placed or performed during the hospital encounter of 01/01/24 (from the past 24 hour(s))   Magnesium   Result Value Ref Range    Magnesium 1.98 1.60 - 2.40 mg/dL   CBC   Result Value Ref Range    WBC 9.4 4.4 - 11.3 x10*3/uL    nRBC 0.0 0.0 - 0.0 /100 WBCs    RBC 2.38 (L) 4.00 - 5.20 x10*6/uL    Hemoglobin 7.6 (L) 12.0 - 16.0 g/dL    Hematocrit 23.7 (L) 36.0 - 46.0 %     80 - 100 fL    MCH 31.9 26.0 - 34.0 pg    MCHC 32.1 32.0 - 36.0 g/dL    RDW 12.8 11.5 - 14.5 %    Platelets 238 150 - 450 x10*3/uL   Comprehensive metabolic panel   Result Value Ref Range    Glucose 97 74 - 99 mg/dL    Sodium 137 136 - 145 mmol/L    Potassium 3.6 3.5 - 5.3 mmol/L    Chloride 105 98 - 107 mmol/L    Bicarbonate 26 21 - 32 mmol/L    Anion Gap 10 10 - 20 mmol/L    Urea Nitrogen 16 6 - 23 mg/dL    Creatinine 0.47 (L) 0.50 - 1.05 mg/dL    eGFR 90 >60 mL/min/1.73m*2    Calcium 7.5 (L) 8.6 - 10.3 mg/dL    Albumin 2.7 (L) 3.4 - 5.0 g/dL    Alkaline Phosphatase 30 (L) 33 - 136 U/L    Total Protein 4.8 (L) 6.4 - 8.2 g/dL    AST 18 9 - 39 U/L    Bilirubin, Total 0.3 0.0 - 1.2 mg/dL    ALT 9 7 - 45 U/L             FL less than 1 hour    Result Date: 1/2/2024  These images are not reportable by radiology and will not be interpreted by  Radiologists.    XR hip right with pelvis when performed 1 view    Result Date: 1/2/2024  Interpreted By:  Emily Davis, STUDY: XR HIP RIGHT WITH PELVIS WHEN PERFORMED 1 VIEW; ;  1/2/2024 4:49 pm   INDICATION: Signs/Symptoms:incorrect closing count.   COMPARISON: 01/01/2024   ACCESSION NUMBER(S): [id number]   ORDERING CLINICIAN: LOIS ORTA   FINDINGS: Single postoperative frontal radiograph of the right hip. Interval right femoral IM nail fixation  proximally with an interlocking partially threaded cannulated screw extending into the femoral head and neck fixating intertrochanteric comminuted fracture. No retained radiopaque foreign body seen. Atherosclerosis. Gas and swelling in the soft tissues and joint compatible with postoperative changes.       No unexpected retained radiopaque foreign body seen.     MACRO: None   Signed by: Emily Davis 1/2/2024 5:01 PM Dictation workstation:   [id number]    ECG 12 lead    Result Date: 1/2/2024  Sinus bradycardia Inferior infarct (cited on or before 17-FEB-2010) Abnormal ECG When compared with ECG of 29-DEC-2019 03:55, Sinus rhythm has replaced Atrial fibrillation Vent. rate has decreased BY  58 BPM    XR femur right 2+ views    Result Date: 1/2/2024  Interpreted By:  Anjum Sahu, STUDY: XR FEMUR RIGHT 2+ VIEWS; ;  1/1/2024 9:25 pm   INDICATION: Signs/Symptoms:Need full length femur films for surgical planning purposes, prior hip and knee films reviewed.   COMPARISON: Same-day radiographs   ACCESSION NUMBER(S): [id number]   ORDERING CLINICIAN: LOIS ORTA   FINDINGS: Five views of the right femur: Similar alignment of acute comminuted right intertrochanteric fracture with apex-anterolateral angulation and mild impaction. Vascular calcifications. No distal femur fracture. Diffuse osteopenia. Small knee joint effusion. Moderate rectal stool volume.       1. Similar alignment of comminuted intertrochanteric fracture. 2. No distal femur fracture.   Signed by: Anjum Sahu 1/2/2024 12:32 AM Dictation workstation:   [id number]    CT hip right wo IV contrast    Result Date: 1/1/2024  Interpreted By:  Mejia Rich, STUDY: CT HIP RIGHT WO IV CONTRAST;  1/1/2024 4:31 pm   INDICATION: Signs/Symptoms:Further evaluate R femoral neck fx.   COMPARISON: None.   ACCESSION NUMBER(S): [id number]   ORDERING CLINICIAN: CLAIRE CARLSON   TECHNIQUE: Helical trans axial images were obtained with additional orthogonal  reconstructions with bone technique.   FINDINGS: Bony structures:  There is a comminuted and impacted intertrochanteric fracture with varus angulation. There is reticulation indicating edema and hematoma in the surrounding soft tissue. The remaining bony structures are intact.   Joint spaces:  Mild narrowing of the hip joint indicating mild osteoarthritis without osteophytosis. No significant joint effusion.   Tendons and ligaments:  Maintained as visualized.   Musculature and fascia:  Maintained.   Subcutaneous tissue:  Unremarkable without significant edema.   Vasculature:  No significant atherosclerosis.   ADDITIONAL FINDINGS: None significant       Intertrochanteric fracture.   Signed by: Mejia Rich 1/1/2024 4:56 PM Dictation workstation:   [id number]    XR chest 1 view    Result Date: 1/1/2024  Interpreted By:  Claire Woods, STUDY: XR CHEST 1 VIEW;  1/1/2024 3:40 pm   INDICATION: Signs/Symptoms:Partially visualized lung consolidation on CT C spine.   COMPARISON: 12/29/2019   ACCESSION NUMBER(S): [id number]   ORDERING CLINICIAN: CLAIRE CARLSON   FINDINGS:     The cardiomediastinal silhouette and pulmonary vasculature are within normal limits. Atherosclerotic aorta. No consolidation, pleural effusion or pneumothorax.   No fracture of the left humeral surgical neck.       No acute cardiopulmonary process. Previously described opacities most likely represent volume averaging or atelectasis.     MACRO: None.   Signed by: Claire Woods 1/1/2024 4:23 PM Dictation workstation:   [id number]    XR hip right with pelvis when performed 2 or 3 views    Result Date: 1/1/2024  STUDY: Pelvis and Right Hip Radiographs; 01/01/2024 11:43AM INDICATION: Right hip pain post fall. COMPARISON: None Available. ACCESSION NUMBER(S): [id number] ORDERING CLINICIAN: CLAIRE CARLSON TECHNIQUE:  AP view of the pelvis and two view(s) of the right hip. FINDINGS:  PELVIS: The pelvic ring is intact.  There is a displaced and angulated  fracture involving the right femoral neck.. RIGHT HIP: There is a displaced angulated fracture involving the right femoral neck.. .  There are vascular soft tissue calcifications noted..    1.  Displaced angulated right femoral neck fracture.  No definite associated dislocation.. Signed by Xavi Coy MD    XR knee right 1-2 views    Result Date: 1/1/2024  Interpreted By:  Zbigniew Christensen, STUDY: XR KNEE RIGHT 1-2 VIEWS  1/1/2024 12:42 pm   INDICATION: Signs/Symptoms:Fall, pain   COMPARISON: None.   ACCESSION NUMBER(S): [id number]   ORDERING CLINICIAN: CLAIRE CARLSON   TECHNIQUE: 2 views of the right knee including AP and lateral projections were obtained.   FINDINGS: There is no evidence of acute fracture or dislocation identified. Mild degenerative changes are seen throughout the right knee. No suprapatellar joint effusion is present.       1. No acute fracture or dislocation. 2. Degenerative changes, as described above.   MACRO: None.   Signed by: Zbigniew Christensen 1/1/2024 12:48 PM Dictation workstation:   [id number]    CT cervical spine wo IV contrast    Result Date: 1/1/2024  Interpreted By:  Carmen Barrett, STUDY: CT CERVICAL SPINE WO IV CONTRAST;  1/1/2024 12:27 pm   INDICATION: Signs/Symptoms:Fall, head injury.   COMPARISON: 10/31/2021   ACCESSION NUMBER(S): [id number]   ORDERING CLINICIAN: CLAIRE CARLSON   TECHNIQUE: CT images were obtained through the cervical spine. Sagittal and coronal reconstructions were generated.   FINDINGS:     ALIGNMENT: No significant spondylolisthesis.   VERTEBRAE/DISC SPACES: No compression deformity or acute displaced fracture.  Diffuse osteopenia. Multilevel degenerative changes including the atlanto axial articulation, vertebral endplates and bilateral facet joints.   ADDITIONAL FINDINGS: No abnormal thickening of the prevertebral soft tissues.  Vague interstitial opacity in the medial left apex on the more caudal images. Subcentimeter hypodense nodule in the right lobe of the  thyroid.       No cervical vertebral displacement or acute displaced fracture. Osteopenia and degenerative changes noted.   Partially imaged infiltrate in the medial left apex and subcentimeter hypodense nodule in the right lobe of the thyroid incidentally noted.   MACRO: None.   Signed by: Carmen Barrett 1/1/2024 12:45 PM Dictation workstation:   [id number]    CT head wo IV contrast    Result Date: 1/1/2024  Interpreted By:  Carmen Barrett, STUDY: CT HEAD WO IV CONTRAST;  1/1/2024 12:27 pm   INDICATION: Signs/Symptoms:Fall, head injury.   COMPARISON: 10/31/2021   ACCESSION NUMBER(S): [id number]   ORDERING CLINICIAN: CLAIRE CARLSON   TECHNIQUE: Unenhanced CT images of the head were obtained.   FINDINGS: The ventricles, cisterns and sulci are prominent, consistent with diffuse volume loss. There are areas of nonspecific white matter hypodensity, which are probably age related or microvascular in nature. These findings are similar to the previous exam. There is no acute intracranial hemorrhage, mass effect or midline shift. No extraaxial fluid collection.   No acute displaced calvarial fracture.   Visualized paranasal sinuses are clear.       No acute intracranial hemorrhage or mass-effect.   MACRO: None.   Signed by: Carmen Barrett 1/1/2024 12:39 PM Dictation workstation:   [id number]       Estelle Jung is a 89 y.o. female on day 3 of admission presenting with Closed displaced intertrochanteric fracture of right femur (CMS/HCC).  Assessment/Plan    Principal Problem:    Closed displaced intertrochanteric fracture of right femur (CMS/HCC)  Active Problems:    Closed displaced intertrochanteric fracture of right femur, initial encounter (CMS/HCC)    This is a 89 yo female who initially presented to the ED with complaint of right hip pain after mechanical fall.  She has significant medical history of hypertension, hyperlipidemia, osteoporosis and paroxysmal A-fib.  She has been admitted to the hospital for surgery of  right intertrochanteric femur.  She underwent operative on 1/2/2024 with uncomplicated postoperative course.     #Right intertrochanteric femur fracture status post open reduction and internal fixation.  #Mechanical fall  - Postop day 2.  - Hb down trended to 7.6 on 1/4   - Diet regular diet  - Orthopedics consulted, appreciate recs  - Pain control: Scheduled acetaminophen, tramadol as needed  - Nausea: IV Zofran as needed  - PT/OT    #Paroxysmal A-fib not on anticoagulation  -On telemetry  -Patient currently on normal sinus rhythm  -Plan and replace electrolytes as K above 4, mag above 2     #Hypocalcemia in the setting of hypoalbuminemia improved  -Initial 7.5 on 1/3.  -On 1/3 calcium is 7.8, corrected calcium is 8.6.  -Vitamin D within normal level.    #Normocytic normochromic anemia  -Hb 8.7 with BL of 12.7.  In the setting of open reduction internal fixation.  -Estimated blood loss during procedure was 25 mL  -Ferritin 181, iron level 19 with low TIBC and low transferrin saturation may suggest anemia of chronic disease.  - Patient give One dose of IV venofer   - Started on iron daily.   - Will transfuse if hemoglobin drops less than 7.  - Trend CBC daily.      Chronic conditions:  #HTN  #HLD  #Osteoporosis  -Continue home meds as tolerated       DVT ppx: Resume Lovenox  Fluids: LR  On Telemetry  Code Status: Full Code  Dispo : Patient is pending pre-CERT to [place]     Assessment and plan discussed with my attending.   Hola Webb MD   Internal Medicine, PGY-1 .

## 2024-01-04 NOTE — PROGRESS NOTES
Physical Therapy    Physical Therapy    Physical Therapy Treatment    Patient Name: Estelle Jung  MRN: [id number]  Today's Date: 1/4/2024  Time Calculation  Start Time: 0952  Stop Time: 1022  Time Calculation (min): 30 min       Assessment/Plan     PT Plan  Inpatient/Swing Bed or Outpatient: Inpatient       01/04/24 0952   PT  Visit   PT Received On 01/04/24   Response to Previous Treatment Patient with no complaints from previous session.   General   Reason for Referral Impaired Mobility   Referred By Jayant Rodriguez MD   Past Medical History Relevant to Rehab Admitted 1/1/2024 after a fall.  Xray right hip revealed displaced angulated right femoral neck fracture.  1/2/2024 patient had right hip ORIF completed by Dr Cooper.  PMH: HTN, HLD, orthostatic hypotension, syncope, kidney surgery   General Comment Patientin bed pleasant and cooperative   Pain Assessment   Pain Assessment 0-10   Pain Score 6   Pain Type Surgical pain   Pain Location Hip   Pain Orientation Right   Therapeutic Exercise   Therapeutic Exercise Performed Yes   Therapeutic Exercise Activity 1 x15 reps each B  (AP,QS,GS,LAQ,MARCHING)   Bed Mobility   Bed Mobility Yes   Bed Mobility 1   Bed Mobility 1 Supine to sitting;Sitting to supine   Level of Assistance 1 Moderate assistance   Ambulation/Gait Training   Ambulation/Gait Training Performed Yes   Ambulation/Gait Training 1   Surface 1 Level tile   Device 1 Rolling walker   Gait Support Devices Gait belt   Assistance 1 Minimum assistance   Quality of Gait 1 Decreased step length   Comments/Distance (ft) 1 30 X 2   Transfers   Transfer Yes   Transfer 1   Technique 1 Sit to stand;Stand to sit   Transfer Device 1 Walker   Transfer Level of Assistance 1 Contact guard;Minimum assistance   Trials/Comments 1 2   Activity Tolerance   Endurance Tolerates 30+ min exercise without fatigue   PT Assessment   PT Assessment Results Decreased strength;Decreased endurance;Decreased mobility   Rehab Prognosis  Good   End of Session Communication Bedside nurse   End of Session Patient Position Up in chair;Alarm on   PT Plan   Inpatient/Swing Bed or Outpatient Inpatient     Outcome Measures:  [facility] Basic Mobility  Turning from your back to your side while in a flat bed without using bedrails: A lot  Moving from lying on your back to sitting on the side of a flat bed without using bedrails: A lot  Moving to and from bed to chair (including a wheelchair): A little  Standing up from a chair using your arms (e.g. wheelchair or bedside chair): A little  To walk in hospital room: A little  Climbing 3-5 steps with railing: A lot  Basic Mobility - Total Score: 15  Education Documentation  No documentation found.  Education Comments  No comments found.            EDUCATION:       GOALS:  Encounter Problems       Encounter Problems (Active)       Balance       STG-Patient will be CGA with assistive device dynamic stand task >5 minutes for ADL completion   (Progressing)       Start:  01/03/24    Expected End:  01/17/24               Mobility       STG-Patient will be CGA with assistive device functional mobility tasks   (Progressing)       Start:  01/03/24    Expected End:  01/17/24               PT Problem       Pt will perform bed mobility with min A.   (Progressing)       Start:  01/03/24    Expected End:  01/17/24            Pt will complete sit <> stand and bed <> chair transfers with min A.   (Progressing)       Start:  01/03/24    Expected End:  01/17/24            Pt will ambulate 50 feet min A using FWW with no significant gait deviations.   (Progressing)       Start:  01/03/24    Expected End:  01/17/24            Pt will progress to completing 3 x 20 supine/seated exercises in order to increase strength and improve gait mechanics.   (Progressing)       Start:  01/03/24    Expected End:  01/17/24               Transfers       STG-Patient will be CGA with functional transfers demonstrating good safety (Progressing)        Start:  01/03/24    Expected End:  01/17/24

## 2024-01-04 NOTE — PROGRESS NOTES
Occupational Therapy    OT Treatment    Patient Name: Estelle Jung  MRN: [id number]  Today's Date: 1/4/2024  Time Calculation  Start Time: 0931  Stop Time: 1010  Time Calculation (min): 39 min         Assessment:  OT Assessment: patient agreeable to working with O.T.  Evaluation/Treatment Tolerance: Patient tolerated treatment well  End of Session Communication: Bedside nurse  End of Session Patient Position: Up in chair, Alarm on  Evaluation/Treatment Tolerance: Patient tolerated treatment well    Plan:  OT Frequency: Daily     Subjective     Current Problem:  Patient Active Problem List   Diagnosis    Atrial fibrillation, currently in sinus rhythm    HTN (hypertension), benign    Palpitations    Post-concussion headache    Premature atrial contractions    Vasodepressor syncope    Closed displaced intertrochanteric fracture of right femur (CMS/[facility])    Closed displaced intertrochanteric fracture of right femur, initial encounter (CMS/[facility])       General:  OT Received On: 01/04/24  Reason for Referral: R femur intertrochanteric fx  Patient Position Received: Up in chair  General Comment: Patient was happy to participate in O.T.    Vital Signs:       Pain:  Pain Assessment  Pain Assessment: 0-10  Pain Score: 6  Pain Location: Incision  Pain Orientation:  (Patient reported pain at the end of treatment session.)  Objective      Activities of Daily Living:    Grooming  Grooming Comments: Slow walk to sink using wheeled walker.  Cues for safe walker placement at the sink.  Patient stood for thorough face washing, oral hygiene, hair combing and hand washing. (Patient started to get fatigued after standing for approximately 15 minutes but did not need to sit down to rest.  Patient was able to walk back to the recliner.)                   Functional Standing Tolerance:  Time: 15 min  Activity: Grooming tasks    Bed Mobility/Transfers:                     Therapy/Activity:  Some assist needed to manage the walker at times.               Strength:       Other Activity:       Outcome Measures:[facility] Daily Activity  Putting on and taking off regular lower body clothing: A lot  Bathing (including washing, rinsing, drying): A little  Putting on and taking off regular upper body clothing: None  Toileting, which includes using toilet, bedpan or urinal: A little  Taking care of personal grooming such as brushing teeth: None  Eating Meals: None  Daily Activity - Total Score: 20  Education Documentation  No documentation found.  Education Comments  No comments found.        EDUCATION:  Education  Individual(s) Educated: Patient  Education Provided: Fall precautons  Patient Response to Education: Patient/Caregiver Verbalized Understanding of Information    Goals:  Encounter Problems       Encounter Problems (Active)       Balance       STG-Patient will be CGA with assistive device dynamic stand task >5 minutes for ADL completion   (Progressing)       Start:  01/03/24    Expected End:  01/17/24               Dressings Lower Extremities       STG - Patient will complete lower body dressing with min assist with AE and comp strategies  (Progressing)       Start:  01/03/24    Expected End:  01/17/24               Mobility       STG-Patient will be CGA with assistive device functional mobility tasks   (Progressing)       Start:  01/03/24    Expected End:  01/17/24               Transfers       STG-Patient will be CGA with functional transfers demonstrating good safety (Progressing)       Start:  01/03/24    Expected End:  01/17/24

## 2024-01-04 NOTE — PROGRESS NOTES
Patient seen and examined at bedside.  She reports that she is doing well today.  She reports working with physical therapy earlier today and felt that it went well.  She reports her pain has been controlled.  She is tolerating p.o. intake.  She has had some issues with constipation but has otherwise felt well.    Right lower extremity MSK  Right hip dressing clean dry and intact  Ankle PF/DF intact  DP/PT pulse 2+  Sensation intact to light touch in the right lower extremity    Agree with plan as documented below.  Will continue to monitor acute blood loss anemia secondary to recent surgery.  Appreciate medicine recommendations.  Okay for discharge when arranged and patient is medically cleared.    Lui Cooper MD  Orthopaedic Surgery       Estelle Jung is a 89 y.o. female  presenting with Closed displaced intertrochanteric fracture of right femur (CMS/HCC).    Subjective   Ms. Jung rates her pain as mild. She was able to ambulate the short distance to her chair, sat up for a few hours. She complains of constipation today, had loose stool yesterday. Slept well last night. Planning on SNF upon discharge.       Objective     Physical Exam  Vitals reviewed.   HENT:      Head: Normocephalic.      Mouth/Throat:      Mouth: Mucous membranes are moist.      Pharynx: Oropharynx is clear.   Eyes:      Extraocular Movements: Extraocular movements intact.   Cardiovascular:      Rate and Rhythm: Normal rate.   Pulmonary:      Effort: Pulmonary effort is normal.   Abdominal:      Palpations: Abdomen is soft.   Musculoskeletal:      Comments: Right femur dressing is dry and intact.  light touch sensation is intact, ankle dorsiflexion/plantar flexion is intact. DP 2+/2 palpable     Skin:     General: Skin is warm and dry.      Capillary Refill: Capillary refill takes less than 2 seconds.   Neurological:      General: No focal deficit present.      Mental Status: She is alert. Mental status is at baseline.  "  Psychiatric:         Mood and Affect: Mood normal.         Last Recorded Vitals  Blood pressure 119/67, pulse 73, temperature 36.9 °C (98.4 °F), temperature source Temporal, resp. rate 16, height 1.575 m (5' 2\"), weight 55.8 kg (123 lb), SpO2 96 %.  Intake/Output last 3 Shifts:  I/O last 3 completed shifts:  In: 200 (3.6 mL/kg) [IV Piggyback:200]  Out: 2000 (35.8 mL/kg) [Urine:2000 (1 mL/kg/hr)]  Weight: 55.8 kg     Relevant Results      Scheduled medications  acetaminophen, 975 mg, oral, TID  amLODIPine, 5 mg, oral, q PM  [Held by provider] aspirin, 81 mg, oral, Every Mon/Wed/Fri  calcium carbonate, 1,250 mg, oral, Daily  cholecalciferol, 2,000 Units, oral, BID  dorzolamide-timoloL, 1 drop, Both Eyes, BID  enoxaparin, 40 mg, subcutaneous, Daily  metoprolol succinate XL, 25 mg, oral, BID  [START ON 1/7/2024] neomycin-polymyxin-dexAMETHasone, 1 drop, Right Eye, q7 days  pantoprazole, 20 mg, oral, Daily before breakfast  polyethylene glycol, 17 g, oral, Daily  pravastatin, 20 mg, oral, Nightly      Continuous medications  oxygen, 2 L/min      PRN medications  PRN medications: hyoscyamine, naloxone, ondansetron, traMADol  Results for orders placed or performed during the hospital encounter of 01/01/24 (from the past 24 hour(s))   Magnesium   Result Value Ref Range    Magnesium 1.98 1.60 - 2.40 mg/dL   CBC   Result Value Ref Range    WBC 9.4 4.4 - 11.3 x10*3/uL    nRBC 0.0 0.0 - 0.0 /100 WBCs    RBC 2.38 (L) 4.00 - 5.20 x10*6/uL    Hemoglobin 7.6 (L) 12.0 - 16.0 g/dL    Hematocrit 23.7 (L) 36.0 - 46.0 %     80 - 100 fL    MCH 31.9 26.0 - 34.0 pg    MCHC 32.1 32.0 - 36.0 g/dL    RDW 12.8 11.5 - 14.5 %    Platelets 238 150 - 450 x10*3/uL   Comprehensive metabolic panel   Result Value Ref Range    Glucose 97 74 - 99 mg/dL    Sodium 137 136 - 145 mmol/L    Potassium 3.6 3.5 - 5.3 mmol/L    Chloride 105 98 - 107 mmol/L    Bicarbonate 26 21 - 32 mmol/L    Anion Gap 10 10 - 20 mmol/L    Urea Nitrogen 16 6 - 23 mg/dL "    Creatinine 0.47 (L) 0.50 - 1.05 mg/dL    eGFR 90 >60 mL/min/1.73m*2    Calcium 7.5 (L) 8.6 - 10.3 mg/dL    Albumin 2.7 (L) 3.4 - 5.0 g/dL    Alkaline Phosphatase 30 (L) 33 - 136 U/L    Total Protein 4.8 (L) 6.4 - 8.2 g/dL    AST 18 9 - 39 U/L    Bilirubin, Total 0.3 0.0 - 1.2 mg/dL    ALT 9 7 - 45 U/L             FL less than 1 hour    Result Date: 1/2/2024  These images are not reportable by radiology and will not be interpreted by  Radiologists.    XR hip right with pelvis when performed 1 view    Result Date: 1/2/2024  Interpreted By:  Emily Davis, STUDY: XR HIP RIGHT WITH PELVIS WHEN PERFORMED 1 VIEW; ;  1/2/2024 4:49 pm   INDICATION: Signs/Symptoms:incorrect closing count.   COMPARISON: 01/01/2024   ACCESSION NUMBER(S): [id number]   ORDERING CLINICIAN: LOIS ORTA   FINDINGS: Single postoperative frontal radiograph of the right hip. Interval right femoral IM nail fixation proximally with an interlocking partially threaded cannulated screw extending into the femoral head and neck fixating intertrochanteric comminuted fracture. No retained radiopaque foreign body seen. Atherosclerosis. Gas and swelling in the soft tissues and joint compatible with postoperative changes.       No unexpected retained radiopaque foreign body seen.     MACRO: None   Signed by: Emily Davis 1/2/2024 5:01 PM Dictation workstation:   [id number]              Assessment/Plan   Principal Problem:    Closed displaced intertrochanteric fracture of right femur (CMS/HCC)  Active Problems:    Closed displaced intertrochanteric fracture of right femur, initial encounter (CMS/[facility])    Closed displaced intertrochanteric right femur fracture  POD #2 s/p right  femur cephallomedullary nailing  Continue PT/OT, WBAT right  LE  Using incentive spirometer   Reviewed am labs  Multimodal pain regimen  Bowel regimen, added dulcolax suppository  Surgical hip dressing to be removed on post op day #7  VTE prophylaxis: lovenox while  inpatient, transition to aspirin upon discharge.   When appropriate, will discharge home with [facility]  Follow up with Dr. Cooper in 2 weeks    2. Acute postoperative blood loss anemia on chronic anemia  Hemoglobin preoperatively was 9.8  Hemoglobin today 7.6, which is below 9 and consistent of acute blood loss anemia  Patient is asymptomatic, vitals are stable  No indication for transfusion this time  Oral iron ordered  CBC in a.m.           I spent 25 minutes in the professional and overall care of this patient.      Imani Banuelos PA-C

## 2024-01-04 NOTE — CARE PLAN
Problem: Pain  Goal: My pain/discomfort is manageable  Outcome: Progressing     Problem: Safety  Goal: Patient will be injury free during hospitalization  Outcome: Progressing  Goal: I will remain free of falls  Outcome: Progressing     Problem: Daily Care  Goal: Daily care needs are met  Outcome: Progressing     Problem: Psychosocial Needs  Goal: Demonstrates ability to cope with hospitalization/illness  Outcome: Progressing  Goal: Collaborate with me, my family, and caregiver to identify my specific goals  Outcome: Progressing     Problem: Discharge Barriers  Goal: My discharge needs are met  Outcome: Progressing     Problem: Skin  Goal: Participates in plan/prevention/treatment measures  Outcome: Progressing  Flowsheets (Taken 1/3/2024 0716 by Feli Brito, RN)  Participates in plan/prevention/treatment measures:   Increase activity/out of bed for meals   Elevate heels  Goal: Prevent/manage excess moisture  Outcome: Progressing  Flowsheets (Taken 1/3/2024 0716 by Feli Brito, RN)  Prevent/manage excess moisture:   Cleanse incontinence/protect with barrier cream   Monitor for/manage infection if present  Goal: Prevent/minimize sheer/friction injuries  Outcome: Progressing  Flowsheets (Taken 1/3/2024 0716 by Feli Brito, RN)  Prevent/minimize sheer/friction injuries:   Complete micro-shifts as needed if patient unable. Adjust patient position to relieve pressure points, not a full turn   HOB 30 degrees or less   Increase activity/out of bed for meals   Turn/reposition every 2 hours/use positioning/transfer devices   Use pull sheet  Goal: Promote/optimize nutrition  Outcome: Progressing  Flowsheets (Taken 1/3/2024 0716 by Feli Brito, RN)  Promote/optimize nutrition:   Offer water/supplements/favorite foods   Consume > 50% meals/supplements   Monitor/record intake including meals     Problem: Dressings Lower Extremities  Goal: STG - Patient will complete lower body dressing with min assist with  AE and comp strategies   Outcome: Progressing   The patient's goals for the shift include      The clinical goals for the shift include pain control

## 2024-01-05 LAB
ALBUMIN SERPL BCP-MCNC: 2.5 G/DL (ref 3.4–5)
ALP SERPL-CCNC: 34 U/L (ref 33–136)
ALT SERPL W P-5'-P-CCNC: 8 U/L (ref 7–45)
ANION GAP SERPL CALC-SCNC: 8 MMOL/L (ref 10–20)
AST SERPL W P-5'-P-CCNC: 18 U/L (ref 9–39)
BILIRUB SERPL-MCNC: 0.5 MG/DL (ref 0–1.2)
BLOOD EXPIRATION DATE: NORMAL
BUN SERPL-MCNC: 12 MG/DL (ref 6–23)
CALCIUM SERPL-MCNC: 7.6 MG/DL (ref 8.6–10.3)
CHLORIDE SERPL-SCNC: 106 MMOL/L (ref 98–107)
CO2 SERPL-SCNC: 26 MMOL/L (ref 21–32)
CREAT SERPL-MCNC: 0.32 MG/DL (ref 0.5–1.05)
DISPENSE STATUS: NORMAL
ERYTHROCYTE [DISTWIDTH] IN BLOOD BY AUTOMATED COUNT: 13 % (ref 11.5–14.5)
GFR SERPL CREATININE-BSD FRML MDRD: >90 ML/MIN/1.73M*2
GLUCOSE SERPL-MCNC: 96 MG/DL (ref 74–99)
HCT VFR BLD AUTO: 24 % (ref 36–46)
HGB BLD-MCNC: 7.4 G/DL (ref 12–16)
MAGNESIUM SERPL-MCNC: 2.02 MG/DL (ref 1.6–2.4)
MCH RBC QN AUTO: 31.4 PG (ref 26–34)
MCHC RBC AUTO-ENTMCNC: 30.8 G/DL (ref 32–36)
MCV RBC AUTO: 102 FL (ref 80–100)
NRBC BLD-RTO: 0 /100 WBCS (ref 0–0)
PLATELET # BLD AUTO: 267 X10*3/UL (ref 150–450)
POTASSIUM SERPL-SCNC: 3.8 MMOL/L (ref 3.5–5.3)
PRODUCT BLOOD TYPE: 5100
PRODUCT CODE: NORMAL
PROT SERPL-MCNC: 4.6 G/DL (ref 6.4–8.2)
RBC # BLD AUTO: 2.36 X10*6/UL (ref 4–5.2)
SODIUM SERPL-SCNC: 136 MMOL/L (ref 136–145)
UNIT ABO: NORMAL
UNIT NUMBER: NORMAL
UNIT RH: NORMAL
UNIT VOLUME: 350
WBC # BLD AUTO: 9 X10*3/UL (ref 4.4–11.3)
XM INTEP: NORMAL

## 2024-01-05 PROCEDURE — 2500000004 HC RX 250 GENERAL PHARMACY W/ HCPCS (ALT 636 FOR OP/ED)

## 2024-01-05 PROCEDURE — 97535 SELF CARE MNGMENT TRAINING: CPT | Mod: GO,CO

## 2024-01-05 PROCEDURE — 2500000001 HC RX 250 WO HCPCS SELF ADMINISTERED DRUGS (ALT 637 FOR MEDICARE OP): Performed by: PHYSICIAN ASSISTANT

## 2024-01-05 PROCEDURE — 1200000002 HC GENERAL ROOM WITH TELEMETRY DAILY

## 2024-01-05 PROCEDURE — 99233 SBSQ HOSP IP/OBS HIGH 50: CPT

## 2024-01-05 PROCEDURE — 36415 COLL VENOUS BLD VENIPUNCTURE: CPT | Performed by: PHYSICIAN ASSISTANT

## 2024-01-05 PROCEDURE — 2500000001 HC RX 250 WO HCPCS SELF ADMINISTERED DRUGS (ALT 637 FOR MEDICARE OP): Performed by: ORTHOPAEDIC SURGERY

## 2024-01-05 PROCEDURE — 83735 ASSAY OF MAGNESIUM: CPT

## 2024-01-05 PROCEDURE — 2500000004 HC RX 250 GENERAL PHARMACY W/ HCPCS (ALT 636 FOR OP/ED): Performed by: ORTHOPAEDIC SURGERY

## 2024-01-05 PROCEDURE — 80053 COMPREHEN METABOLIC PANEL: CPT

## 2024-01-05 PROCEDURE — 85027 COMPLETE CBC AUTOMATED: CPT | Performed by: PHYSICIAN ASSISTANT

## 2024-01-05 PROCEDURE — 97110 THERAPEUTIC EXERCISES: CPT | Mod: GP,CQ

## 2024-01-05 PROCEDURE — 97116 GAIT TRAINING THERAPY: CPT | Mod: GP,CQ

## 2024-01-05 RX ORDER — POTASSIUM CHLORIDE 20 MEQ/1
20 TABLET, EXTENDED RELEASE ORAL ONCE
Status: COMPLETED | OUTPATIENT
Start: 2024-01-05 | End: 2024-01-05

## 2024-01-05 RX ADMIN — PRAVASTATIN SODIUM 20 MG: 20 TABLET ORAL at 21:48

## 2024-01-05 RX ADMIN — ACETAMINOPHEN 975 MG: 325 TABLET ORAL at 14:39

## 2024-01-05 RX ADMIN — Medication 150 MG: at 09:12

## 2024-01-05 RX ADMIN — ACETAMINOPHEN 975 MG: 325 TABLET ORAL at 09:12

## 2024-01-05 RX ADMIN — CHOLECALCIFEROL TAB 25 MCG (1000 UNIT) 2000 UNITS: 25 TAB at 09:12

## 2024-01-05 RX ADMIN — POTASSIUM CHLORIDE 20 MEQ: 1500 TABLET, EXTENDED RELEASE ORAL at 14:41

## 2024-01-05 RX ADMIN — METOPROLOL SUCCINATE 25 MG: 25 TABLET, EXTENDED RELEASE ORAL at 21:48

## 2024-01-05 RX ADMIN — DORZOLAMIDE HYDROCHLORIDE AND TIMOLOL MALEATE 1 DROP: 20; 5 SOLUTION/ DROPS OPHTHALMIC at 09:12

## 2024-01-05 RX ADMIN — PANTOPRAZOLE SODIUM 20 MG: 20 TABLET, DELAYED RELEASE ORAL at 09:26

## 2024-01-05 RX ADMIN — ENOXAPARIN SODIUM 40 MG: 40 INJECTION SUBCUTANEOUS at 17:52

## 2024-01-05 RX ADMIN — CHOLECALCIFEROL TAB 25 MCG (1000 UNIT) 2000 UNITS: 25 TAB at 21:48

## 2024-01-05 RX ADMIN — AMLODIPINE BESYLATE 5 MG: 5 TABLET ORAL at 21:48

## 2024-01-05 RX ADMIN — DORZOLAMIDE HYDROCHLORIDE AND TIMOLOL MALEATE 1 DROP: 20; 5 SOLUTION/ DROPS OPHTHALMIC at 21:48

## 2024-01-05 RX ADMIN — ACETAMINOPHEN 975 MG: 325 TABLET ORAL at 21:48

## 2024-01-05 RX ADMIN — METOPROLOL SUCCINATE 25 MG: 25 TABLET, EXTENDED RELEASE ORAL at 09:12

## 2024-01-05 RX ADMIN — CALCIUM 1250 MG: 500 TABLET ORAL at 09:12

## 2024-01-05 ASSESSMENT — COGNITIVE AND FUNCTIONAL STATUS - GENERAL
DRESSING REGULAR LOWER BODY CLOTHING: A LOT
HELP NEEDED FOR BATHING: A LITTLE
CLIMB 3 TO 5 STEPS WITH RAILING: TOTAL
DRESSING REGULAR UPPER BODY CLOTHING: A LITTLE
MOBILITY SCORE: 15
DAILY ACTIVITIY SCORE: 19
TOILETING: A LITTLE
MOVING TO AND FROM BED TO CHAIR: A LITTLE
DAILY ACTIVITIY SCORE: 19
WALKING IN HOSPITAL ROOM: A LITTLE
DRESSING REGULAR UPPER BODY CLOTHING: A LITTLE
STANDING UP FROM CHAIR USING ARMS: A LITTLE
MOBILITY SCORE: 15
MOVING FROM LYING ON BACK TO SITTING ON SIDE OF FLAT BED WITH BEDRAILS: A LOT
MOVING TO AND FROM BED TO CHAIR: A LITTLE
TURNING FROM BACK TO SIDE WHILE IN FLAT BAD: A LITTLE
TURNING FROM BACK TO SIDE WHILE IN FLAT BAD: A LITTLE
CLIMB 3 TO 5 STEPS WITH RAILING: TOTAL
HELP NEEDED FOR BATHING: A LITTLE
MOVING FROM LYING ON BACK TO SITTING ON SIDE OF FLAT BED WITH BEDRAILS: A LOT
DRESSING REGULAR LOWER BODY CLOTHING: A LOT
WALKING IN HOSPITAL ROOM: A LITTLE
STANDING UP FROM CHAIR USING ARMS: A LITTLE
TOILETING: A LITTLE

## 2024-01-05 ASSESSMENT — PAIN DESCRIPTION - DESCRIPTORS
DESCRIPTORS: SORE
DESCRIPTORS: SORE

## 2024-01-05 ASSESSMENT — PAIN - FUNCTIONAL ASSESSMENT
PAIN_FUNCTIONAL_ASSESSMENT: 0-10

## 2024-01-05 ASSESSMENT — PAIN SCALES - GENERAL
PAINLEVEL_OUTOF10: 2

## 2024-01-05 ASSESSMENT — ACTIVITIES OF DAILY LIVING (ADL): HOME_MANAGEMENT_TIME_ENTRY: 39

## 2024-01-05 ASSESSMENT — PAIN SCALES - WONG BAKER: WONGBAKER_NUMERICALRESPONSE: HURTS LITTLE BIT

## 2024-01-05 NOTE — PROGRESS NOTES
Occupational Therapy    OT Treatment    Patient Name: Estelle Jung  MRN: [id number]  Today's Date: 1/5/2024  Time Calculation  Start Time: 1231  Stop Time: 1310  Time Calculation (min): 39 min         Assessment:  OT Assessment: Patient would benefit from continued OT services with focus on strengthening, lower body dressing, bed mobility  Evaluation/Treatment Tolerance: Patient tolerated treatment well  End of Session Communication: Bedside nurse  End of Session Patient Position: Up in chair, Alarm on  OT Assessment Results: Decreased ADL status  Evaluation/Treatment Tolerance: Patient tolerated treatment well    Plan:  OT Frequency: Daily     Subjective     Current Problem:  Patient Active Problem List   Diagnosis    Atrial fibrillation, currently in sinus rhythm    HTN (hypertension), benign    Palpitations    Post-concussion headache    Premature atrial contractions    Vasodepressor syncope    Closed displaced intertrochanteric fracture of right femur (CMS/[facility])    Closed displaced intertrochanteric fracture of right femur, initial encounter (CMS/[facility])       General:  OT Received On: 01/05/24  Reason for Referral: decreased independence with ADLs  Patient Position Received: Up in chair  General Comment: patient agreeable to working with O.T.    Vital Signs:       Pain:  Pain Assessment  Pain Assessment:  (No voiced complaints)  Pain Score: 6  Pain Location: Incision  Pain Orientation:  (Patient reported pain at the end of treatment session.)  Objective      Activities of Daily Living:    Grooming  Grooming Level of Assistance: Close supervision  Grooming Where Assessed: Standing sinkside  Grooming Comments: Walk to sink using wheeld walker, approx. 25'.  Patient stood for oral hygiene, face and hand washing, no assist.   Transfer to the toilet.              Toileting  Toileting Comments: Patient was able to manage hygiene following a BM.  Transfer back to recliner.    Functional Standing Tolerance:  Time: 15  min  Activity: Grooming tasks  Functional Standing Tolerance Comments: Extra time to rearrange items stored in the bathroom.    Bed Mobility/Transfers:    Transfers  Transfer: Yes  Transfer 1  Transfer From 1: Sit to  Transfer to 1: Stand  Trials/Comments 1: Slight struggle sit to stand from recliner. Minimal assist required.                Therapy/Activity:               Strength:       Other Activity:       Outcome Measures:[facility] Daily Activity  Putting on and taking off regular lower body clothing: A lot  Bathing (including washing, rinsing, drying): A little  Putting on and taking off regular upper body clothing: A little  Toileting, which includes using toilet, bedpan or urinal: A little  Taking care of personal grooming such as brushing teeth: None  Eating Meals: None  Daily Activity - Total Score: 19  Education Documentation  No documentation found.  Education Comments  No comments found.        EDUCATION:  Education  Individual(s) Educated: Patient  Education Provided: Fall precautons  Patient Response to Education: Patient/Caregiver Verbalized Understanding of Information    Goals:  Encounter Problems       Encounter Problems (Active)       Balance       STG-Patient will be CGA with assistive device dynamic stand task >5 minutes for ADL completion   (Progressing)       Start:  01/03/24    Expected End:  01/17/24               Dressings Lower Extremities       STG - Patient will complete lower body dressing with min assist with AE and comp strategies  (Progressing)       Start:  01/03/24    Expected End:  01/17/24               Mobility       STG-Patient will be CGA with assistive device functional mobility tasks   (Progressing)       Start:  01/03/24    Expected End:  01/17/24               Transfers       STG-Patient will be CGA with functional transfers demonstrating good safety (Progressing)       Start:  01/03/24    Expected End:  01/17/24

## 2024-01-05 NOTE — PROGRESS NOTES
Estelle Jung is a 89 y.o. female on day 4 of admission presenting with Closed displaced intertrochanteric fracture of right femur (CMS/HCC).      Subjective   Patient was seen and examined today in the morning at bedside.  No acute events overnight.  Patient expressed that pain is tolerable with Tylenol only and she tried the tramadol before physical therapy and it made her very nauseous and dizzy otherwise she denies any chest pain, palpitation, or active complaint at the moment.       Objective     Last Recorded Vitals  /57 (BP Location: Right arm, Patient Position: Lying)   Pulse 72   Temp 36.3 °C (97.3 °F) (Temporal)   Resp 16   Wt 55.8 kg (123 lb) Comment: zeroed with sheet, 1 blanket, 2 pillows  SpO2 98%   Intake/Output last 3 Shifts:    Intake/Output Summary (Last 24 hours) at 1/5/2024 1425  Last data filed at 1/5/2024 0336  Gross per 24 hour   Intake 240 ml   Output 450 ml   Net -210 ml         Admission Weight  Weight: 54.4 kg (120 lb) (01/01/24 1146)    Daily Weight  01/01/24 : 55.8 kg (123 lb)      Physical Exam:  General: Not in acute distress, alert  HEENT: PERRLA, head intact and normocephalic  Neck: Normal to inspection  Lungs: Clear to auscultation, work of breathing within normal limit  Cardiac: Regular rate and rhythm  Abdomen: Soft nontender, positive bowel sounds  : Exam deferred  Skin: Multiple bruises on the left arm and right arm  Hematology: No petechia or excessive ecchymosis  Musculoskeletal: Right hip status post ORIF.  No bleeding or swelling  Neurological: Alert awake oriented, no focal deficit, cranial nerves grossly intact  Psych: No suicidal ideation or homicidal ideation    Relevant Results  Scheduled medications  acetaminophen, 975 mg, oral, TID  amLODIPine, 5 mg, oral, q PM  [Held by provider] aspirin, 81 mg, oral, Every Mon/Wed/Fri  calcium carbonate, 1,250 mg, oral, Daily  cholecalciferol, 2,000 Units, oral, BID  dorzolamide-timoloL, 1 drop, Both Eyes,  BID  enoxaparin, 40 mg, subcutaneous, Daily  iron polysaccharides, 150 mg, oral, Daily  metoprolol succinate XL, 25 mg, oral, BID  [START ON 1/7/2024] neomycin-polymyxin-dexAMETHasone, 1 drop, Right Eye, q7 days  pantoprazole, 20 mg, oral, Daily before breakfast  polyethylene glycol, 17 g, oral, Daily  pravastatin, 20 mg, oral, Nightly      Continuous medications  oxygen, 2 L/min      PRN medications  PRN medications: bisacodyl, hyoscyamine, naloxone, ondansetron, traMADol     Results for orders placed or performed during the hospital encounter of 01/01/24 (from the past 24 hour(s))   CBC   Result Value Ref Range    WBC 9.0 4.4 - 11.3 x10*3/uL    nRBC 0.0 0.0 - 0.0 /100 WBCs    RBC 2.36 (L) 4.00 - 5.20 x10*6/uL    Hemoglobin 7.4 (L) 12.0 - 16.0 g/dL    Hematocrit 24.0 (L) 36.0 - 46.0 %     (H) 80 - 100 fL    MCH 31.4 26.0 - 34.0 pg    MCHC 30.8 (L) 32.0 - 36.0 g/dL    RDW 13.0 11.5 - 14.5 %    Platelets 267 150 - 450 x10*3/uL   Comprehensive metabolic panel   Result Value Ref Range    Glucose 96 74 - 99 mg/dL    Sodium 136 136 - 145 mmol/L    Potassium 3.8 3.5 - 5.3 mmol/L    Chloride 106 98 - 107 mmol/L    Bicarbonate 26 21 - 32 mmol/L    Anion Gap 8 (L) 10 - 20 mmol/L    Urea Nitrogen 12 6 - 23 mg/dL    Creatinine 0.32 (L) 0.50 - 1.05 mg/dL    eGFR >90 >60 mL/min/1.73m*2    Calcium 7.6 (L) 8.6 - 10.3 mg/dL    Albumin 2.5 (L) 3.4 - 5.0 g/dL    Alkaline Phosphatase 34 33 - 136 U/L    Total Protein 4.6 (L) 6.4 - 8.2 g/dL    AST 18 9 - 39 U/L    Bilirubin, Total 0.5 0.0 - 1.2 mg/dL    ALT 8 7 - 45 U/L   Magnesium   Result Value Ref Range    Magnesium 2.02 1.60 - 2.40 mg/dL             FL less than 1 hour    Result Date: 1/2/2024  These images are not reportable by radiology and will not be interpreted by  Radiologists.    XR hip right with pelvis when performed 1 view    Result Date: 1/2/2024  Interpreted By:  Emily Davis, STUDY: XR HIP RIGHT WITH PELVIS WHEN PERFORMED 1 VIEW; ;  1/2/2024 4:49 pm    INDICATION: Signs/Symptoms:incorrect closing count.   COMPARISON: 01/01/2024   ACCESSION NUMBER(S): [id number]   ORDERING CLINICIAN: LOIS ORTA   FINDINGS: Single postoperative frontal radiograph of the right hip. Interval right femoral IM nail fixation proximally with an interlocking partially threaded cannulated screw extending into the femoral head and neck fixating intertrochanteric comminuted fracture. No retained radiopaque foreign body seen. Atherosclerosis. Gas and swelling in the soft tissues and joint compatible with postoperative changes.       No unexpected retained radiopaque foreign body seen.     MACRO: None   Signed by: Emily Davis 1/2/2024 5:01 PM Dictation workstation:   [id number]    ECG 12 lead    Result Date: 1/2/2024  Sinus bradycardia Inferior infarct (cited on or before 17-FEB-2010) Abnormal ECG When compared with ECG of 29-DEC-2019 03:55, Sinus rhythm has replaced Atrial fibrillation Vent. rate has decreased BY  58 BPM    XR femur right 2+ views    Result Date: 1/2/2024  Interpreted By:  Anjum Sahu, STUDY: XR FEMUR RIGHT 2+ VIEWS; ;  1/1/2024 9:25 pm   INDICATION: Signs/Symptoms:Need full length femur films for surgical planning purposes, prior hip and knee films reviewed.   COMPARISON: Same-day radiographs   ACCESSION NUMBER(S): [id number]   ORDERING CLINICIAN: LOIS ORTA   FINDINGS: Five views of the right femur: Similar alignment of acute comminuted right intertrochanteric fracture with apex-anterolateral angulation and mild impaction. Vascular calcifications. No distal femur fracture. Diffuse osteopenia. Small knee joint effusion. Moderate rectal stool volume.       1. Similar alignment of comminuted intertrochanteric fracture. 2. No distal femur fracture.   Signed by: Anjum Sahu 1/2/2024 12:32 AM Dictation workstation:   [id number]    CT hip right wo IV contrast    Result Date: 1/1/2024  Interpreted By:  Mejia Rich, STUDY: CT HIP RIGHT WO IV  CONTRAST;  1/1/2024 4:31 pm   INDICATION: Signs/Symptoms:Further evaluate R femoral neck fx.   COMPARISON: None.   ACCESSION NUMBER(S): [id number]   ORDERING CLINICIAN: CLAIRE CARLSON   TECHNIQUE: Helical trans axial images were obtained with additional orthogonal reconstructions with bone technique.   FINDINGS: Bony structures:  There is a comminuted and impacted intertrochanteric fracture with varus angulation. There is reticulation indicating edema and hematoma in the surrounding soft tissue. The remaining bony structures are intact.   Joint spaces:  Mild narrowing of the hip joint indicating mild osteoarthritis without osteophytosis. No significant joint effusion.   Tendons and ligaments:  Maintained as visualized.   Musculature and fascia:  Maintained.   Subcutaneous tissue:  Unremarkable without significant edema.   Vasculature:  No significant atherosclerosis.   ADDITIONAL FINDINGS: None significant       Intertrochanteric fracture.   Signed by: Mejia Rich 1/1/2024 4:56 PM Dictation workstation:   [id number]    XR chest 1 view    Result Date: 1/1/2024  Interpreted By:  Claire Woods, STUDY: XR CHEST 1 VIEW;  1/1/2024 3:40 pm   INDICATION: Signs/Symptoms:Partially visualized lung consolidation on CT C spine.   COMPARISON: 12/29/2019   ACCESSION NUMBER(S): [id number]   ORDERING CLINICIAN: CLAIRE CARLSON   FINDINGS:     The cardiomediastinal silhouette and pulmonary vasculature are within normal limits. Atherosclerotic aorta. No consolidation, pleural effusion or pneumothorax.   No fracture of the left humeral surgical neck.       No acute cardiopulmonary process. Previously described opacities most likely represent volume averaging or atelectasis.     MACRO: None.   Signed by: Claire Woods 1/1/2024 4:23 PM Dictation workstation:   [id number]    XR hip right with pelvis when performed 2 or 3 views    Result Date: 1/1/2024  STUDY: Pelvis and Right Hip Radiographs; 01/01/2024 11:43AM INDICATION: Right hip  pain post fall. COMPARISON: None Available. ACCESSION NUMBER(S): [id number] ORDERING CLINICIAN: CLAIRE CARLSON TECHNIQUE:  AP view of the pelvis and two view(s) of the right hip. FINDINGS:  PELVIS: The pelvic ring is intact.  There is a displaced and angulated fracture involving the right femoral neck.. RIGHT HIP: There is a displaced angulated fracture involving the right femoral neck.. .  There are vascular soft tissue calcifications noted..    1.  Displaced angulated right femoral neck fracture.  No definite associated dislocation.. Signed by Xavi Coy MD    XR knee right 1-2 views    Result Date: 1/1/2024  Interpreted By:  Zbigniew Christensen, STUDY: XR KNEE RIGHT 1-2 VIEWS  1/1/2024 12:42 pm   INDICATION: Signs/Symptoms:Fall, pain   COMPARISON: None.   ACCESSION NUMBER(S): [id number]   ORDERING CLINICIAN: CLAIRE CARLSON   TECHNIQUE: 2 views of the right knee including AP and lateral projections were obtained.   FINDINGS: There is no evidence of acute fracture or dislocation identified. Mild degenerative changes are seen throughout the right knee. No suprapatellar joint effusion is present.       1. No acute fracture or dislocation. 2. Degenerative changes, as described above.   MACRO: None.   Signed by: Zbigniew Christensen 1/1/2024 12:48 PM Dictation workstation:   [id number]    CT cervical spine wo IV contrast    Result Date: 1/1/2024  Interpreted By:  Carmen Barrett, STUDY: CT CERVICAL SPINE WO IV CONTRAST;  1/1/2024 12:27 pm   INDICATION: Signs/Symptoms:Fall, head injury.   COMPARISON: 10/31/2021   ACCESSION NUMBER(S): [id number]   ORDERING CLINICIAN: CLAIRE CARLSON   TECHNIQUE: CT images were obtained through the cervical spine. Sagittal and coronal reconstructions were generated.   FINDINGS:     ALIGNMENT: No significant spondylolisthesis.   VERTEBRAE/DISC SPACES: No compression deformity or acute displaced fracture.  Diffuse osteopenia. Multilevel degenerative changes including the atlanto axial articulation, vertebral  endplates and bilateral facet joints.   ADDITIONAL FINDINGS: No abnormal thickening of the prevertebral soft tissues.  Vague interstitial opacity in the medial left apex on the more caudal images. Subcentimeter hypodense nodule in the right lobe of the thyroid.       No cervical vertebral displacement or acute displaced fracture. Osteopenia and degenerative changes noted.   Partially imaged infiltrate in the medial left apex and subcentimeter hypodense nodule in the right lobe of the thyroid incidentally noted.   MACRO: None.   Signed by: Carmen Barrett 1/1/2024 12:45 PM Dictation workstation:   [id number]    CT head wo IV contrast    Result Date: 1/1/2024  Interpreted By:  Carmen Barrett, STUDY: CT HEAD WO IV CONTRAST;  1/1/2024 12:27 pm   INDICATION: Signs/Symptoms:Fall, head injury.   COMPARISON: 10/31/2021   ACCESSION NUMBER(S): [id number]   ORDERING CLINICIAN: CLAIRE CARLSON   TECHNIQUE: Unenhanced CT images of the head were obtained.   FINDINGS: The ventricles, cisterns and sulci are prominent, consistent with diffuse volume loss. There are areas of nonspecific white matter hypodensity, which are probably age related or microvascular in nature. These findings are similar to the previous exam. There is no acute intracranial hemorrhage, mass effect or midline shift. No extraaxial fluid collection.   No acute displaced calvarial fracture.   Visualized paranasal sinuses are clear.       No acute intracranial hemorrhage or mass-effect.   MACRO: None.   Signed by: Carmen Barrett 1/1/2024 12:39 PM Dictation workstation:   [id number]       Estelle Jung is a 89 y.o. female on day 4 of admission presenting with Closed displaced intertrochanteric fracture of right femur (CMS/HCC).  Assessment/Plan    Principal Problem:    Closed displaced intertrochanteric fracture of right femur (CMS/HCC)  Active Problems:    Closed displaced intertrochanteric fracture of right femur, initial encounter (CMS/[facility])    This is a 89 yo  female who initially presented to the ED with complaint of right hip pain after mechanical fall.  She has significant medical history of hypertension, hyperlipidemia, osteoporosis and paroxysmal A-fib.  She has been admitted to the hospital for surgery of right intertrochanteric femur.  She underwent operative on 1/2/2024 with uncomplicated postoperative course.     #Right intertrochanteric femur fracture status post open reduction and internal fixation.  #Mechanical fall  - Postop day 3.  - Hb down trended to 7.4 on 1/4   - Diet regular diet  - Orthopedics consulted, appreciate recs  - Pain control: Scheduled acetaminophen, tramadol as needed  - Nausea: IV Zofran as needed  - PT/OT    #Paroxysmal A-fib not on anticoagulation  -On telemetry  -Patient currently on normal sinus rhythm  -Plan and replace electrolytes as K above 4, mag above 2     #Hypocalcemia in the setting of hypoalbuminemia improved  -Initial 7.6 on 1/5.  -Vitamin D within normal level.  -Calcium carbonate 1250 mg daily    #Normocytic normochromic anemia  -Hb 7.4 with BL of 12.7.  In the setting of open reduction internal fixation.  -Estimated blood loss during procedure was 25 mL  -Ferritin 181, iron level 19 with low TIBC and low transferrin saturation may suggest anemia of chronic disease.  - Patient give One dose of IV venofer   - Started on iron daily.   - Will transfuse if hemoglobin drops less than 7.  - Trend CBC daily.      Chronic conditions:  #HTN  #HLD  #Osteoporosis  -Continue home meds as tolerated       DVT ppx: Resume Lovenox  Fluids: LR  On Telemetry  Code Status: Full Code  Dispo : Patient is pending pre-[facility] to [place]     Assessment and plan discussed with my attending.   Hola Webb MD   Internal Medicine, PGY-1 .

## 2024-01-05 NOTE — PROGRESS NOTES
Patient seen and examined this morning at bedside.  Continues to do well and reports pain has been well controlled with tylenol.  Does not want to take any additional tramadol as this had made her lightheaded.  Reports some loose stools - discussed holding Miralax.  Patient would like to hold this.  Denies any additional issues.  Awaiting discharge to facility.      Right lower extremity MSK  Right hip dressing clean dry and intact  Ankle PF/DF intact  DP/PT pulse 2+  Sensation intact to light touch in the right lower extremity  Able to gently flex hip     Agree with plan as documented below.  Will continue to monitor acute blood loss anemia secondary to recent surgery - patient continues to remain asymptomatic.  Will hold Miralax as above.  Appreciate medicine recommendations.  Okay for discharge when arranged and patient is medically cleared.      Lui Cooper MD  Orthopaedic Surgery          Estelle Jung is a 89 y.o. female on day 4 of admission presenting with Closed displaced intertrochanteric fracture of right femur (CMS/HCC).    Subjective   In bed upon entering room.  States overall doing well, expected postoperative pain but less than prior to surgery.  Is dealing with some issues of nausea but no vomiting.  Working with therapy.  Planning for discharge to skilled nursing facility when insurance authorization is received.       Objective     Physical Exam  Constitutional:       Appearance: Normal appearance.   HENT:      Head: Normocephalic and atraumatic.      Nose: Nose normal.      Mouth/Throat:      Mouth: Mucous membranes are moist.   Cardiovascular:      Rate and Rhythm: Normal rate and regular rhythm.   Pulmonary:      Effort: Pulmonary effort is normal.      Breath sounds: Normal breath sounds.   Abdominal:      General: Abdomen is flat. Bowel sounds are normal.      Palpations: Abdomen is soft.   Musculoskeletal:      Cervical back: Neck supple.      Comments: Right hip with surgical  "dressings in place no staining appreciated, distally sensation is present throughout right lower extremity, plantar and dorsiflexion against resistance in right foot, DP pulse palpable right foot   Skin:     General: Skin is warm and dry.   Neurological:      General: No focal deficit present.      Mental Status: She is alert and oriented to person, place, and time.   Psychiatric:         Mood and Affect: Mood normal.         Last Recorded Vitals  Blood pressure 135/66, pulse 78, temperature 37.1 °C (98.8 °F), temperature source Temporal, resp. rate 16, height 1.575 m (5' 2\"), weight 55.8 kg (123 lb), SpO2 97 %.  Intake/Output last 3 Shifts:  I/O last 3 completed shifts:  In: 360 (6.5 mL/kg) [P.O.:360]  Out: 1000 (17.9 mL/kg) [Urine:1000 (0.5 mL/kg/hr)]  Weight: 55.8 kg     Relevant Results  Results for orders placed or performed during the hospital encounter of 01/01/24 (from the past 24 hour(s))   CBC   Result Value Ref Range    WBC 9.0 4.4 - 11.3 x10*3/uL    nRBC 0.0 0.0 - 0.0 /100 WBCs    RBC 2.36 (L) 4.00 - 5.20 x10*6/uL    Hemoglobin 7.4 (L) 12.0 - 16.0 g/dL    Hematocrit 24.0 (L) 36.0 - 46.0 %     (H) 80 - 100 fL    MCH 31.4 26.0 - 34.0 pg    MCHC 30.8 (L) 32.0 - 36.0 g/dL    RDW 13.0 11.5 - 14.5 %    Platelets 267 150 - 450 x10*3/uL   Comprehensive metabolic panel   Result Value Ref Range    Glucose 96 74 - 99 mg/dL    Sodium 136 136 - 145 mmol/L    Potassium 3.8 3.5 - 5.3 mmol/L    Chloride 106 98 - 107 mmol/L    Bicarbonate 26 21 - 32 mmol/L    Anion Gap 8 (L) 10 - 20 mmol/L    Urea Nitrogen 12 6 - 23 mg/dL    Creatinine 0.32 (L) 0.50 - 1.05 mg/dL    eGFR >90 >60 mL/min/1.73m*2    Calcium 7.6 (L) 8.6 - 10.3 mg/dL    Albumin 2.5 (L) 3.4 - 5.0 g/dL    Alkaline Phosphatase 34 33 - 136 U/L    Total Protein 4.6 (L) 6.4 - 8.2 g/dL    AST 18 9 - 39 U/L    Bilirubin, Total 0.5 0.0 - 1.2 mg/dL    ALT 8 7 - 45 U/L   Magnesium   Result Value Ref Range    Magnesium 2.02 1.60 - 2.40 mg/dL     Scheduled " medications  acetaminophen, 975 mg, oral, TID  amLODIPine, 5 mg, oral, q PM  [Held by provider] aspirin, 81 mg, oral, Every Mon/Wed/Fri  calcium carbonate, 1,250 mg, oral, Daily  cholecalciferol, 2,000 Units, oral, BID  dorzolamide-timoloL, 1 drop, Both Eyes, BID  enoxaparin, 40 mg, subcutaneous, Daily  iron polysaccharides, 150 mg, oral, Daily  metoprolol succinate XL, 25 mg, oral, BID  [START ON 1/7/2024] neomycin-polymyxin-dexAMETHasone, 1 drop, Right Eye, q7 days  pantoprazole, 20 mg, oral, Daily before breakfast  polyethylene glycol, 17 g, oral, Daily  pravastatin, 20 mg, oral, Nightly      Continuous medications  oxygen, 2 L/min      PRN medications  PRN medications: bisacodyl, hyoscyamine, naloxone, ondansetron, traMADol    Assessment/Plan   Principal Problem:    Closed displaced intertrochanteric fracture of right femur (CMS/[facility])  Active Problems:    Closed displaced intertrochanteric fracture of right femur, initial encounter (CMS/[facility])    Postop day 3 of right femur cephallomedullary nailing   Physical and Occupational Therapy are on consult  Weightbearing as tolerated with walker  Lovenox while inpatient and transition to aspirin 81 mg p.o. twice daily x 30 days postoperatively at discharge for VTE prophylaxis  Labs reviewed, hemoglobin 7.4, hemodynamically stable, no need for transfusion at this time  Multimodal pain regimen  Home medications ordered for chronic medical conditions as appropriate  Bowel regime  Encourage incentive spirometry  Plans on discharge to skilled nursing facility  Follow-up with Dr. Cooper as directed       I spent 20 minutes in the professional and overall care of this patient.      Lashae Palmer PA-C

## 2024-01-05 NOTE — PROGRESS NOTES
Physical Therapy    Physical Therapy Treatment    Patient Name: Estelle Jung  MRN: [id number]  Today's Date: 1/5/2024  Time Calculation  Start Time: 0940  Stop Time: 1020  Time Calculation (min): 40 min       Assessment/Plan   PT Assessment  PT Assessment Results: Decreased strength, Decreased range of motion, Decreased endurance, Impaired balance, Decreased mobility, Pain  Rehab Prognosis: Good  Evaluation/Treatment Tolerance: Patient tolerated treatment well  Medical Staff Made Aware: Yes  End of Session Communication: Bedside nurse  Assessment Comment:   End of Session Patient Position: Up in chair, Alarm on  PT Plan  Inpatient/Swing Bed or Outpatient: Inpatient  PT Plan  Treatment/Interventions: Bed mobility, Transfer training, Gait training, Balance training, Neuromuscular re-education, Strengthening, Endurance training, Range of motion, Therapeutic exercise, Therapeutic activity, Home exercise program  PT Plan: Skilled PT  PT Frequency: Daily  PT Discharge Recommendations: High intensity level of continued care  Equipment Recommended upon Discharge: Wheeled walker  PT Recommended Transfer Status: Assist x1 (FWW, gait belt)  PT - OK to Discharge: Yes      01/05/24 0940   PT  Visit   PT Received On 01/05/24   Response to Previous Treatment Patient with no complaints from previous session.   General   Reason for Referral Impaired Mobility   Referred By Jayant Rodriguez MD   Past Medical History Relevant to Rehab Admitted 1/1/2024 after a fall.  Xray right hip revealed displaced angulated right femoral neck fracture.  1/2/2024 patient had right hip ORIF completed by Dr Cooper.  PMH: HTN, HLD, orthostatic hypotension, syncope, kidney surgery   Patient Position Received Alarm on;Up in chair   Preferred Learning Style verbal   General Comment Pt. agreeable to PT, nursing cleared for tx.   Pain Assessment   Pain Assessment 0-10   Pain Score 2   Pain Type Surgical pain   Pain Location Leg   Pain Orientation Right    Pain Descriptors Sore   Cognition   Overall Cognitive Status WFL   Orientation Level Oriented X4   Therapeutic Exercise   Therapeutic Exercise Performed Yes   Therapeutic Exercise Activity 1 x15 reps GERA  (AP, LAQ, pillow squeezes, resisted abduction, GS)   Ambulation/Gait Training   Ambulation/Gait Training Performed Yes   Ambulation/Gait Training 1   Surface 1 Level tile   Device 1 Rolling walker   Gait Support Devices Gait belt   Assistance 1 Minimum assistance   Quality of Gait 1 Diminished heel strike;Decreased step length;Forward flexed posture   Comments/Distance (ft) 1 40 ft. x2   Transfers   Transfer Yes   Transfer 1   Transfer From 1 Sit to;Stand to   Transfer to 1 Commode-standard   Technique 1 Sit to stand;Stand to sit   Transfer Device 1 Walker;Gait belt   Transfer Level of Assistance 1 Minimum assistance   Trials/Comments 1 x1  (Extra time needed for christine-care)   Transfers 2   Transfer From 2 Sit to;Stand to;Chair with arms to   Transfer to 2 Sit;Stand   Technique 2 Sit to stand;Stand to sit   Transfer Device 2 Walker   Transfer Level of Assistance 2 Minimum assistance   Trials/Comments 2 x2   Activity Tolerance   Endurance Endurance does not limit participation in activity   PT Assessment   PT Assessment Results Decreased strength;Decreased range of motion;Decreased endurance;Impaired balance;Decreased mobility;Pain   Rehab Prognosis Good   Evaluation/Treatment Tolerance Patient tolerated treatment well   Medical Staff Made Aware Yes   End of Session Communication Bedside nurse   End of Session Patient Position Up in chair;Alarm on   PT Plan   Treatment/Interventions Bed mobility;Transfer training;Gait training;Balance training;Neuromuscular re-education;Strengthening;Endurance training;Range of motion;Therapeutic exercise;Therapeutic activity;Home exercise program   PT Plan Skilled PT   PT Frequency Daily   PT Discharge Recommendations High intensity level of continued care   Equipment Recommended  upon Discharge Wheeled walker   PT Recommended Transfer Status Assist x1  (FWW, gait belt)     Outcome Measures:  [facility] Basic Mobility  Turning from your back to your side while in a flat bed without using bedrails: A lot  Moving from lying on your back to sitting on the side of a flat bed without using bedrails: A little  Moving to and from bed to chair (including a wheelchair): A little  Standing up from a chair using your arms (e.g. wheelchair or bedside chair): A little  To walk in hospital room: A little  Climbing 3-5 steps with railing: Total  Basic Mobility - Total Score: 15  Education Documentation  Precautions, taught by Monie Gariaby PTA at 1/5/2024 12:15 PM.  Learner: Patient  Readiness: Acceptance  Method: Explanation  Response: Verbalizes Understanding    Body Mechanics, taught by Monie Garibay PTA at 1/5/2024 12:15 PM.  Learner: Patient  Readiness: Acceptance  Method: Explanation  Response: Verbalizes Understanding    Home Exercise Program, taught by Monie Garibay PTA at 1/5/2024 12:15 PM.  Learner: Patient  Readiness: Acceptance  Method: Explanation  Response: Verbalizes Understanding    Mobility Training, taught by Monie Garibay PTA at 1/5/2024 12:15 PM.  Learner: Patient  Readiness: Acceptance  Method: Explanation  Response: Verbalizes Understanding    Education Comments  No comments found.      EDUCATION:       GOALS:  Encounter Problems       Encounter Problems (Active)       Balance       STG-Patient will be CGA with assistive device dynamic stand task >5 minutes for ADL completion   (Progressing)       Start:  01/03/24    Expected End:  01/17/24               Mobility       STG-Patient will be CGA with assistive device functional mobility tasks   (Progressing)       Start:  01/03/24    Expected End:  01/17/24               PT Problem       Pt will perform bed mobility with min A.   (Progressing)       Start:  01/03/24    Expected End:  01/17/24            Pt will complete sit <>  stand and bed <> chair transfers with min A.   (Progressing)       Start:  01/03/24    Expected End:  01/17/24            Pt will ambulate 50 feet min A using FWW with no significant gait deviations.   (Progressing)       Start:  01/03/24    Expected End:  01/17/24            Pt will progress to completing 3 x 20 supine/seated exercises in order to increase strength and improve gait mechanics.   (Progressing)       Start:  01/03/24    Expected End:  01/17/24               Transfers       STG-Patient will be CGA with functional transfers demonstrating good safety (Progressing)       Start:  01/03/24    Expected End:  01/17/24

## 2024-01-05 NOTE — CARE PLAN
The patient's goals for the shift include      The clinical goals for the shift include see care plan    Over the shift, the patient did not make progress toward the following goals. Barriers to progression include . Recommendations to address these barriers include .

## 2024-01-05 NOTE — DOCUMENTATION CLARIFICATION NOTE
"    PATIENT:               HUSEYIN RAMIREZ  ACCT #:                  [id number]  MRN:                       [id number]  :                       10/5/1932  ADMIT DATE:       2024 11:28 AM  DISCH DATE:  RESPONDING PROVIDER #:        [id number]          PROVIDER RESPONSE TEXT:    anemia multifactorial, blood loss/MARVIN/dilutional    CDI QUERY TEXT:    [facility]_Anemia Specificity    Instruction:    Based on your assessment of the patient and the clinical information, please provide the requested documentation by clicking on the appropriate radio button and enter any additional information if prompted.    Question: Please further clarify the diagnosis of anemia as    When answering this query, please exercise your independent professional judgment. The fact that a question is being asked, does not imply that any particular answer is desired or expected.    The patient's clinical indicators include:  Clinical Information: 89 y/o F admitted with Closed displaced intertrochanteric fracture of right femur    Clinical Indicators:    HGB:  : 12.7  : 9.8  1/3: 8.7  : 7.6    ED note  Dr. Perez: \"presents after mechanical fall.  She was walking to the grocery store when she tripped.  She landed on her right side, hurting her right hip as well as hitting the right side of her head.  X-ray shows a right femoral neck fracture\"    ortho consult  Dr. Cooper: \"Right intertrochanteric femur fracture\"    H/P  Dr. Rodriguez:  \"Closed displaced intertrochanteric fracture of right femur,  plan for surgery today\"    OP note  Dr. Cooper: \"Procedure: Hip Fracture ORIF w/ Nail Trochanteric.  Estimated Blood Loss: 25 mL\"    Treatment: daily labs, trend H&H, type and screen    Risk Factors: age, recent surgery  Options provided:  -- Acute blood loss anemia  -- Anemia, unspecified  -- Other - I will add my own diagnosis  -- Refer to Clinical Documentation Reviewer    Query created by: Demetria Henry on 1/3/2024 7:23 " AM      Electronically signed by:  CECILIA BRIGGS MD 1/4/2024 11:00 PM

## 2024-01-06 LAB
ALBUMIN SERPL BCP-MCNC: 2.7 G/DL (ref 3.4–5)
ALP SERPL-CCNC: 33 U/L (ref 33–136)
ALT SERPL W P-5'-P-CCNC: 10 U/L (ref 7–45)
ANION GAP SERPL CALC-SCNC: 9 MMOL/L (ref 10–20)
AST SERPL W P-5'-P-CCNC: 18 U/L (ref 9–39)
BILIRUB SERPL-MCNC: 0.8 MG/DL (ref 0–1.2)
BUN SERPL-MCNC: 9 MG/DL (ref 6–23)
CALCIUM SERPL-MCNC: 7.8 MG/DL (ref 8.6–10.3)
CHLORIDE SERPL-SCNC: 106 MMOL/L (ref 98–107)
CO2 SERPL-SCNC: 26 MMOL/L (ref 21–32)
CREAT SERPL-MCNC: 0.36 MG/DL (ref 0.5–1.05)
ERYTHROCYTE [DISTWIDTH] IN BLOOD BY AUTOMATED COUNT: 13.2 % (ref 11.5–14.5)
GFR SERPL CREATININE-BSD FRML MDRD: >90 ML/MIN/1.73M*2
GLUCOSE SERPL-MCNC: 101 MG/DL (ref 74–99)
HCT VFR BLD AUTO: 23.7 % (ref 36–46)
HGB BLD-MCNC: 7.5 G/DL (ref 12–16)
MCH RBC QN AUTO: 31.8 PG (ref 26–34)
MCHC RBC AUTO-ENTMCNC: 31.6 G/DL (ref 32–36)
MCV RBC AUTO: 100 FL (ref 80–100)
NRBC BLD-RTO: 0 /100 WBCS (ref 0–0)
PLATELET # BLD AUTO: 315 X10*3/UL (ref 150–450)
POTASSIUM SERPL-SCNC: 3.8 MMOL/L (ref 3.5–5.3)
PROT SERPL-MCNC: 4.8 G/DL (ref 6.4–8.2)
RBC # BLD AUTO: 2.36 X10*6/UL (ref 4–5.2)
SODIUM SERPL-SCNC: 137 MMOL/L (ref 136–145)
WBC # BLD AUTO: 8.3 X10*3/UL (ref 4.4–11.3)

## 2024-01-06 PROCEDURE — 99232 SBSQ HOSP IP/OBS MODERATE 35: CPT

## 2024-01-06 PROCEDURE — 2500000001 HC RX 250 WO HCPCS SELF ADMINISTERED DRUGS (ALT 637 FOR MEDICARE OP): Performed by: ORTHOPAEDIC SURGERY

## 2024-01-06 PROCEDURE — 85027 COMPLETE CBC AUTOMATED: CPT

## 2024-01-06 PROCEDURE — 2500000001 HC RX 250 WO HCPCS SELF ADMINISTERED DRUGS (ALT 637 FOR MEDICARE OP): Performed by: PHYSICIAN ASSISTANT

## 2024-01-06 PROCEDURE — 97530 THERAPEUTIC ACTIVITIES: CPT | Mod: GO,CO

## 2024-01-06 PROCEDURE — 97116 GAIT TRAINING THERAPY: CPT | Mod: GP,CQ

## 2024-01-06 PROCEDURE — 97110 THERAPEUTIC EXERCISES: CPT | Mod: GP,CQ

## 2024-01-06 PROCEDURE — 36415 COLL VENOUS BLD VENIPUNCTURE: CPT

## 2024-01-06 PROCEDURE — 97535 SELF CARE MNGMENT TRAINING: CPT | Mod: GO,CO

## 2024-01-06 PROCEDURE — 84075 ASSAY ALKALINE PHOSPHATASE: CPT

## 2024-01-06 PROCEDURE — 1200000002 HC GENERAL ROOM WITH TELEMETRY DAILY

## 2024-01-06 PROCEDURE — 2500000001 HC RX 250 WO HCPCS SELF ADMINISTERED DRUGS (ALT 637 FOR MEDICARE OP)

## 2024-01-06 PROCEDURE — 2500000004 HC RX 250 GENERAL PHARMACY W/ HCPCS (ALT 636 FOR OP/ED): Performed by: ORTHOPAEDIC SURGERY

## 2024-01-06 PROCEDURE — 2500000004 HC RX 250 GENERAL PHARMACY W/ HCPCS (ALT 636 FOR OP/ED)

## 2024-01-06 RX ORDER — ACETAMINOPHEN 500 MG
5 TABLET ORAL NIGHTLY PRN
Status: DISCONTINUED | OUTPATIENT
Start: 2024-01-06 | End: 2024-01-08 | Stop reason: HOSPADM

## 2024-01-06 RX ORDER — ALUMINUM HYDROXIDE, MAGNESIUM HYDROXIDE, AND SIMETHICONE 1200; 120; 1200 MG/30ML; MG/30ML; MG/30ML
10 SUSPENSION ORAL ONCE
Status: COMPLETED | OUTPATIENT
Start: 2024-01-06 | End: 2024-01-06

## 2024-01-06 RX ADMIN — ACETAMINOPHEN 975 MG: 325 TABLET ORAL at 14:55

## 2024-01-06 RX ADMIN — CHOLECALCIFEROL TAB 25 MCG (1000 UNIT) 2000 UNITS: 25 TAB at 20:48

## 2024-01-06 RX ADMIN — CALCIUM 1250 MG: 500 TABLET ORAL at 09:29

## 2024-01-06 RX ADMIN — METOPROLOL SUCCINATE 25 MG: 25 TABLET, EXTENDED RELEASE ORAL at 20:48

## 2024-01-06 RX ADMIN — ALUMINUM HYDROXIDE, MAGNESIUM HYDROXIDE, AND SIMETHICONE 10 ML: 200; 200; 20 SUSPENSION ORAL at 19:07

## 2024-01-06 RX ADMIN — METOPROLOL SUCCINATE 25 MG: 25 TABLET, EXTENDED RELEASE ORAL at 09:29

## 2024-01-06 RX ADMIN — PRAVASTATIN SODIUM 20 MG: 20 TABLET ORAL at 20:48

## 2024-01-06 RX ADMIN — PANTOPRAZOLE SODIUM 20 MG: 20 TABLET, DELAYED RELEASE ORAL at 06:31

## 2024-01-06 RX ADMIN — ACETAMINOPHEN 975 MG: 325 TABLET ORAL at 20:48

## 2024-01-06 RX ADMIN — DORZOLAMIDE HYDROCHLORIDE AND TIMOLOL MALEATE 1 DROP: 20; 5 SOLUTION/ DROPS OPHTHALMIC at 09:29

## 2024-01-06 RX ADMIN — DORZOLAMIDE HYDROCHLORIDE AND TIMOLOL MALEATE 1 DROP: 20; 5 SOLUTION/ DROPS OPHTHALMIC at 20:48

## 2024-01-06 RX ADMIN — CHOLECALCIFEROL TAB 25 MCG (1000 UNIT) 2000 UNITS: 25 TAB at 09:29

## 2024-01-06 RX ADMIN — ENOXAPARIN SODIUM 40 MG: 40 INJECTION SUBCUTANEOUS at 17:44

## 2024-01-06 RX ADMIN — Medication 150 MG: at 09:29

## 2024-01-06 ASSESSMENT — COGNITIVE AND FUNCTIONAL STATUS - GENERAL
STANDING UP FROM CHAIR USING ARMS: A LITTLE
PERSONAL GROOMING: A LITTLE
TURNING FROM BACK TO SIDE WHILE IN FLAT BAD: A LITTLE
WALKING IN HOSPITAL ROOM: A LITTLE
EATING MEALS: A LITTLE
CLIMB 3 TO 5 STEPS WITH RAILING: TOTAL
TOILETING: A LITTLE
MOVING FROM LYING ON BACK TO SITTING ON SIDE OF FLAT BED WITH BEDRAILS: A LOT
STANDING UP FROM CHAIR USING ARMS: A LITTLE
HELP NEEDED FOR BATHING: A LITTLE
TURNING FROM BACK TO SIDE WHILE IN FLAT BAD: A LITTLE
MOVING TO AND FROM BED TO CHAIR: A LITTLE
DRESSING REGULAR LOWER BODY CLOTHING: A LOT
WALKING IN HOSPITAL ROOM: A LITTLE
TOILETING: A LOT
DRESSING REGULAR UPPER BODY CLOTHING: A LITTLE
CLIMB 3 TO 5 STEPS WITH RAILING: A LOT
MOBILITY SCORE: 15
MOVING FROM LYING ON BACK TO SITTING ON SIDE OF FLAT BED WITH BEDRAILS: A LITTLE
DAILY ACTIVITIY SCORE: 15
DRESSING REGULAR UPPER BODY CLOTHING: A LITTLE
DRESSING REGULAR LOWER BODY CLOTHING: A LOT
MOBILITY SCORE: 17
DAILY ACTIVITIY SCORE: 19
HELP NEEDED FOR BATHING: A LOT
MOVING TO AND FROM BED TO CHAIR: A LITTLE

## 2024-01-06 ASSESSMENT — PAIN SCALES - GENERAL
PAINLEVEL_OUTOF10: 3
PAINLEVEL_OUTOF10: 5 - MODERATE PAIN
PAINLEVEL_OUTOF10: 0 - NO PAIN
PAINLEVEL_OUTOF10: 5 - MODERATE PAIN
PAINLEVEL_OUTOF10: 0 - NO PAIN

## 2024-01-06 ASSESSMENT — ACTIVITIES OF DAILY LIVING (ADL): HOME_MANAGEMENT_TIME_ENTRY: 23

## 2024-01-06 ASSESSMENT — PAIN DESCRIPTION - DESCRIPTORS: DESCRIPTORS: ACHING

## 2024-01-06 ASSESSMENT — PAIN - FUNCTIONAL ASSESSMENT
PAIN_FUNCTIONAL_ASSESSMENT: 0-10

## 2024-01-06 NOTE — PROGRESS NOTES
Physical Therapy    Physical Therapy Treatment    Patient Name: Estelle Jung  MRN: [id number]  Today's Date: 1/6/2024  Time Calculation  Start Time: 1200  Stop Time: 1230  Time Calculation (min): 30 min       Assessment/Plan   PT Assessment  PT Assessment Results: Decreased strength, Decreased range of motion, Decreased endurance, Impaired balance, Decreased mobility, Pain  Rehab Prognosis: Good  End of Session Communication: Bedside nurse (Location)  End of Session Patient Position: Up in chair, Alarm on  PT Plan  Inpatient/Swing Bed or Outpatient: Inpatient  PT Plan  Treatment/Interventions: Bed mobility, Transfer training, Gait training, Balance training, Neuromuscular re-education, Strengthening, Endurance training, Range of motion, Therapeutic exercise, Therapeutic activity, Home exercise program  PT Plan: Skilled PT  PT Frequency: Daily  PT Discharge Recommendations: High intensity level of continued care  Equipment Recommended upon Discharge: Wheeled walker  PT Recommended Transfer Status: Contact guard  PT - OK to Discharge:  (OK, To next level of care when cleared by medicalk team.)      General Visit Information:   PT  Visit  PT Received On: 01/06/24  Response to Previous Treatment: Patient with no complaints from previous session.  General  Reason for Referral: Closed displaced interchanteric fracture of R femur ( Principle hospital problem ).  Referred By: Jayant Rodriguez MD  Past Medical History Relevant to Rehab: Admitted 1/1/2024 after a fall.  Xray right hip revealed displaced angulated right femoral neck fracture.  1/2/2024 patient had right hip ORIF completed by Dr Cooper.  PMH: HTN, HLD, orthostatic hypotension, syncope, kidney surgery  Family/Caregiver Present: No  Prior to Session Communication: Bedside nurse (Appropriate for treatment)  Patient Position Received:  (In bathroom with LPN)  Preferred Learning Style: verbal  General Comment:  (Pt. agreeable to tretament session.)    Subjective    Precautions:  Precautions  LE Weight Bearing Status: Weight Bearing as Tolerated  Medical Precautions: Fall precautions  Vital Signs:       Objective   Pain:  Pain Assessment  Pain Assessment: 0-10  Pain Score: 5 - Moderate pain  Pain Type: Acute pain, Surgical pain  Pain Location: Hip  Pain Orientation: Right  Pain Descriptors: Aching  Pain Frequency: Intermittent  Pain Interventions:  (Rest)  Cognition:  Cognition  Orientation Level: Oriented X4  Insight:  (mild)  Postural Control:     Extremity/Trunk Assessments:                      Activity Tolerance:     Treatments:  Therapeutic Exercise  Therapeutic Exercise Performed: Yes (Seated APs , LAQ , L LE resisted TB knee flexion 2 x10 . Max vc to remain on task ( talking a lot throughout treatment ) redirectioin often to focus on treatment session.)                                  Ambulation/Gait Training  Ambulation/Gait Training Performed: Yes  Ambulation/Gait Training 1  Surface 1: Level tile  Device 1: Rolling walker  Gait Support Devices: Gait belt  Assistance 1: Contact guard  Quality of Gait 1: Diminished heel strike, Decreased step length, Forward flexed posture  Comments/Distance (ft) 1: 25ft. x 2  Transfer 1  Transfer From 1: Toilet to  Transfer to 1:  (FWW)  Technique 1: Sit to stand, Stand to sit  Transfer Device 1: Walker  Transfer Level of Assistance 1: Contact guard (For safety)  Trials/Comments 1:  (Multiple triasl from various surfaces to improve I with functional tasks/ reducing risk of falls. Emphasis on safety .)                     Outcome Measures:             [facility] Basic Mobility  Turning from your back to your side while in a flat bed without using bedrails: A little  Moving from lying on your back to sitting on the side of a flat bed without using bedrails: A little  Moving to and from bed to chair (including a wheelchair): A little  Standing up from a chair using your arms (e.g. wheelchair or bedside chair): A little  To walk in hospital  room: A little  Climbing 3-5 steps with railing: A lot  Basic Mobility - Total Score: 17                                                                                          Education Documentation  No documentation found.  Education Comments  No comments found.        OP EDUCATION:  Outpatient Education  Individual(s) Educated: Patient  Education Provided: Fall Risk    Encounter Problems       Encounter Problems (Active)       Balance       STG-Patient will be CGA with assistive device dynamic stand task >5 minutes for ADL completion   (Progressing)       Start:  01/03/24    Expected End:  01/17/24               Mobility       STG-Patient will be CGA with assistive device functional mobility tasks   (Progressing)       Start:  01/03/24    Expected End:  01/17/24               PT Problem       Pt will perform bed mobility with min A.   (Progressing)       Start:  01/03/24    Expected End:  01/17/24            Pt will complete sit <> stand and bed <> chair transfers with min A.   (Progressing)       Start:  01/03/24    Expected End:  01/17/24            Pt will ambulate 50 feet min A using FWW with no significant gait deviations.   (Progressing)       Start:  01/03/24    Expected End:  01/17/24            Pt will progress to completing 3 x 20 supine/seated exercises in order to increase strength and improve gait mechanics.   (Progressing)       Start:  01/03/24    Expected End:  01/17/24               Transfers       STG-Patient will be CGA with functional transfers demonstrating good safety (Progressing)       Start:  01/03/24    Expected End:  01/17/24

## 2024-01-06 NOTE — PROGRESS NOTES
Estelle Jung is a 89 y.o. female on day 5 of admission presenting with Closed displaced intertrochanteric fracture of right femur (CMS/HCC).      Subjective   Patient was seen and examined today in the morning at bedside.  No acute events overnight.  Pain is tolerable level.     Objective     Last Recorded Vitals  /63   Pulse 84   Temp 36 °C (96.8 °F) (Temporal)   Resp 15   Wt 55.8 kg (123 lb) Comment: zeroed with sheet, 1 blanket, 2 pillows  SpO2 96%   Intake/Output last 3 Shifts:    Intake/Output Summary (Last 24 hours) at 1/6/2024 1538  Last data filed at 1/6/2024 0900  Gross per 24 hour   Intake 240 ml   Output 150 ml   Net 90 ml         Admission Weight  Weight: 54.4 kg (120 lb) (01/01/24 1146)    Daily Weight  01/01/24 : 55.8 kg (123 lb)      Physical Exam:  General: Not in acute distress, alert  HEENT: PERRLA, head intact and normocephalic  Neck: Normal to inspection  Lungs: Clear to auscultation, work of breathing within normal limit  Cardiac: Regular rate and rhythm  Abdomen: Soft nontender, positive bowel sounds  : Exam deferred  Skin: Multiple bruises on the left arm and right arm  Hematology: No petechia or excessive ecchymosis  Musculoskeletal: Right hip status post ORIF.  No bleeding or swelling  Neurological: Alert awake oriented, no focal deficit, cranial nerves grossly intact  Psych: No suicidal ideation or homicidal ideation    Relevant Results  Scheduled medications  acetaminophen, 975 mg, oral, TID  amLODIPine, 5 mg, oral, q PM  [Held by provider] aspirin, 81 mg, oral, Every Mon/Wed/Fri  calcium carbonate, 1,250 mg, oral, Daily  cholecalciferol, 2,000 Units, oral, BID  dorzolamide-timoloL, 1 drop, Both Eyes, BID  enoxaparin, 40 mg, subcutaneous, Daily  iron polysaccharides, 150 mg, oral, Daily  metoprolol succinate XL, 25 mg, oral, BID  [START ON 1/7/2024] neomycin-polymyxin-dexAMETHasone, 1 drop, Right Eye, q7 days  pantoprazole, 20 mg, oral, Daily before breakfast  polyethylene  glycol, 17 g, oral, Daily  pravastatin, 20 mg, oral, Nightly      Continuous medications  oxygen, 2 L/min      PRN medications  PRN medications: bisacodyl, hyoscyamine, melatonin, naloxone, ondansetron, traMADol     Results for orders placed or performed during the hospital encounter of 01/01/24 (from the past 24 hour(s))   CBC   Result Value Ref Range    WBC 8.3 4.4 - 11.3 x10*3/uL    nRBC 0.0 0.0 - 0.0 /100 WBCs    RBC 2.36 (L) 4.00 - 5.20 x10*6/uL    Hemoglobin 7.5 (L) 12.0 - 16.0 g/dL    Hematocrit 23.7 (L) 36.0 - 46.0 %     80 - 100 fL    MCH 31.8 26.0 - 34.0 pg    MCHC 31.6 (L) 32.0 - 36.0 g/dL    RDW 13.2 11.5 - 14.5 %    Platelets 315 150 - 450 x10*3/uL   Comprehensive metabolic panel   Result Value Ref Range    Glucose 101 (H) 74 - 99 mg/dL    Sodium 137 136 - 145 mmol/L    Potassium 3.8 3.5 - 5.3 mmol/L    Chloride 106 98 - 107 mmol/L    Bicarbonate 26 21 - 32 mmol/L    Anion Gap 9 (L) 10 - 20 mmol/L    Urea Nitrogen 9 6 - 23 mg/dL    Creatinine 0.36 (L) 0.50 - 1.05 mg/dL    eGFR >90 >60 mL/min/1.73m*2    Calcium 7.8 (L) 8.6 - 10.3 mg/dL    Albumin 2.7 (L) 3.4 - 5.0 g/dL    Alkaline Phosphatase 33 33 - 136 U/L    Total Protein 4.8 (L) 6.4 - 8.2 g/dL    AST 18 9 - 39 U/L    Bilirubin, Total 0.8 0.0 - 1.2 mg/dL    ALT 10 7 - 45 U/L             FL less than 1 hour    Result Date: 1/2/2024  These images are not reportable by radiology and will not be interpreted by  Radiologists.    XR hip right with pelvis when performed 1 view    Result Date: 1/2/2024  Interpreted By:  Emily Davis, STUDY: XR HIP RIGHT WITH PELVIS WHEN PERFORMED 1 VIEW; ;  1/2/2024 4:49 pm   INDICATION: Signs/Symptoms:incorrect closing count.   COMPARISON: 01/01/2024   ACCESSION NUMBER(S): [id number]   ORDERING CLINICIAN: LOIS ORTA   FINDINGS: Single postoperative frontal radiograph of the right hip. Interval right femoral IM nail fixation proximally with an interlocking partially threaded cannulated screw extending  into the femoral head and neck fixating intertrochanteric comminuted fracture. No retained radiopaque foreign body seen. Atherosclerosis. Gas and swelling in the soft tissues and joint compatible with postoperative changes.       No unexpected retained radiopaque foreign body seen.     MACRO: None   Signed by: Emily Davis 1/2/2024 5:01 PM Dictation workstation:   [id number]    ECG 12 lead    Result Date: 1/2/2024  Sinus bradycardia Inferior infarct (cited on or before 17-FEB-2010) Abnormal ECG When compared with ECG of 29-DEC-2019 03:55, Sinus rhythm has replaced Atrial fibrillation Vent. rate has decreased BY  58 BPM    XR femur right 2+ views    Result Date: 1/2/2024  Interpreted By:  Anjum Sahu, STUDY: XR FEMUR RIGHT 2+ VIEWS; ;  1/1/2024 9:25 pm   INDICATION: Signs/Symptoms:Need full length femur films for surgical planning purposes, prior hip and knee films reviewed.   COMPARISON: Same-day radiographs   ACCESSION NUMBER(S): [id number]   ORDERING CLINICIAN: LOIS ORTA   FINDINGS: Five views of the right femur: Similar alignment of acute comminuted right intertrochanteric fracture with apex-anterolateral angulation and mild impaction. Vascular calcifications. No distal femur fracture. Diffuse osteopenia. Small knee joint effusion. Moderate rectal stool volume.       1. Similar alignment of comminuted intertrochanteric fracture. 2. No distal femur fracture.   Signed by: Anjum Sahu 1/2/2024 12:32 AM Dictation workstation:   [id number]    CT hip right wo IV contrast    Result Date: 1/1/2024  Interpreted By:  Mejia Rich, STUDY: CT HIP RIGHT WO IV CONTRAST;  1/1/2024 4:31 pm   INDICATION: Signs/Symptoms:Further evaluate R femoral neck fx.   COMPARISON: None.   ACCESSION NUMBER(S): [id number]   ORDERING CLINICIAN: CLAIRE CARLSON   TECHNIQUE: Helical trans axial images were obtained with additional orthogonal reconstructions with bone technique.   FINDINGS: Bony structures:  There is a  comminuted and impacted intertrochanteric fracture with varus angulation. There is reticulation indicating edema and hematoma in the surrounding soft tissue. The remaining bony structures are intact.   Joint spaces:  Mild narrowing of the hip joint indicating mild osteoarthritis without osteophytosis. No significant joint effusion.   Tendons and ligaments:  Maintained as visualized.   Musculature and fascia:  Maintained.   Subcutaneous tissue:  Unremarkable without significant edema.   Vasculature:  No significant atherosclerosis.   ADDITIONAL FINDINGS: None significant       Intertrochanteric fracture.   Signed by: Mejia Rich 1/1/2024 4:56 PM Dictation workstation:   [id number]    XR chest 1 view    Result Date: 1/1/2024  Interpreted By:  Claire Woods, STUDY: XR CHEST 1 VIEW;  1/1/2024 3:40 pm   INDICATION: Signs/Symptoms:Partially visualized lung consolidation on CT C spine.   COMPARISON: 12/29/2019   ACCESSION NUMBER(S): [id number]   ORDERING CLINICIAN: CLAIRE CARLSON   FINDINGS:     The cardiomediastinal silhouette and pulmonary vasculature are within normal limits. Atherosclerotic aorta. No consolidation, pleural effusion or pneumothorax.   No fracture of the left humeral surgical neck.       No acute cardiopulmonary process. Previously described opacities most likely represent volume averaging or atelectasis.     MACRO: None.   Signed by: Claire Woods 1/1/2024 4:23 PM Dictation workstation:   [id number]    XR hip right with pelvis when performed 2 or 3 views    Result Date: 1/1/2024  STUDY: Pelvis and Right Hip Radiographs; 01/01/2024 11:43AM INDICATION: Right hip pain post fall. COMPARISON: None Available. ACCESSION NUMBER(S): [id number] ORDERING CLINICIAN: CLAIRE CARLSON TECHNIQUE:  AP view of the pelvis and two view(s) of the right hip. FINDINGS:  PELVIS: The pelvic ring is intact.  There is a displaced and angulated fracture involving the right femoral neck.. RIGHT HIP: There is a displaced  angulated fracture involving the right femoral neck.. .  There are vascular soft tissue calcifications noted..    1.  Displaced angulated right femoral neck fracture.  No definite associated dislocation.. Signed by Xavi Coy MD    XR knee right 1-2 views    Result Date: 1/1/2024  Interpreted By:  Zbigniew Christensen, STUDY: XR KNEE RIGHT 1-2 VIEWS  1/1/2024 12:42 pm   INDICATION: Signs/Symptoms:Fall, pain   COMPARISON: None.   ACCESSION NUMBER(S): [id number]   ORDERING CLINICIAN: CLAIRE CARLSON   TECHNIQUE: 2 views of the right knee including AP and lateral projections were obtained.   FINDINGS: There is no evidence of acute fracture or dislocation identified. Mild degenerative changes are seen throughout the right knee. No suprapatellar joint effusion is present.       1. No acute fracture or dislocation. 2. Degenerative changes, as described above.   MACRO: None.   Signed by: Zbigniew Christensen 1/1/2024 12:48 PM Dictation workstation:   [id number]    CT cervical spine wo IV contrast    Result Date: 1/1/2024  Interpreted By:  Carmen Barrett, STUDY: CT CERVICAL SPINE WO IV CONTRAST;  1/1/2024 12:27 pm   INDICATION: Signs/Symptoms:Fall, head injury.   COMPARISON: 10/31/2021   ACCESSION NUMBER(S): [id number]   ORDERING CLINICIAN: CLAIRE CARLSON   TECHNIQUE: CT images were obtained through the cervical spine. Sagittal and coronal reconstructions were generated.   FINDINGS:     ALIGNMENT: No significant spondylolisthesis.   VERTEBRAE/DISC SPACES: No compression deformity or acute displaced fracture.  Diffuse osteopenia. Multilevel degenerative changes including the atlanto axial articulation, vertebral endplates and bilateral facet joints.   ADDITIONAL FINDINGS: No abnormal thickening of the prevertebral soft tissues.  Vague interstitial opacity in the medial left apex on the more caudal images. Subcentimeter hypodense nodule in the right lobe of the thyroid.       No cervical vertebral displacement or acute displaced fracture.  Osteopenia and degenerative changes noted.   Partially imaged infiltrate in the medial left apex and subcentimeter hypodense nodule in the right lobe of the thyroid incidentally noted.   MACRO: None.   Signed by: Carmen Barrett 1/1/2024 12:45 PM Dictation workstation:   [id number]    CT head wo IV contrast    Result Date: 1/1/2024  Interpreted By:  Carmen Barrett, STUDY: CT HEAD WO IV CONTRAST;  1/1/2024 12:27 pm   INDICATION: Signs/Symptoms:Fall, head injury.   COMPARISON: 10/31/2021   ACCESSION NUMBER(S): [id number]   ORDERING CLINICIAN: CLAIRE CARLSON   TECHNIQUE: Unenhanced CT images of the head were obtained.   FINDINGS: The ventricles, cisterns and sulci are prominent, consistent with diffuse volume loss. There are areas of nonspecific white matter hypodensity, which are probably age related or microvascular in nature. These findings are similar to the previous exam. There is no acute intracranial hemorrhage, mass effect or midline shift. No extraaxial fluid collection.   No acute displaced calvarial fracture.   Visualized paranasal sinuses are clear.       No acute intracranial hemorrhage or mass-effect.   MACRO: None.   Signed by: Carmen Barrett 1/1/2024 12:39 PM Dictation workstation:   [id number]       Estelle Jung is a 89 y.o. female on day 5 of admission presenting with Closed displaced intertrochanteric fracture of right femur (CMS/HCC).  Assessment/Plan    Principal Problem:    Closed displaced intertrochanteric fracture of right femur (CMS/HCC)  Active Problems:    Closed displaced intertrochanteric fracture of right femur, initial encounter (CMS/HCC)    This is a 89 yo female who initially presented to the ED with complaint of right hip pain after mechanical fall.  She has significant medical history of hypertension, hyperlipidemia, osteoporosis and paroxysmal A-fib.  She has been admitted to the hospital for surgery of right intertrochanteric femur.  She underwent operative on 1/2/2024 with  uncomplicated postoperative course.     #Right intertrochanteric femur fracture status post open reduction and internal fixation.  #Mechanical fall  - Postop day 4.  - Hb up trended to 7.5 on 1/4   - Diet regular diet  - Orthopedics consulted, appreciate recs  - Pain control: Scheduled acetaminophen, tramadol as needed  - Nausea: IV Zofran as needed  -Patient will be this discharged on aspirin twice daily for DVT prophylaxis per Ortho recommendation.  - PT/OT    #Paroxysmal A-fib not on anticoagulation  -On telemetry  -Patient currently on normal sinus rhythm  -Plan and replace electrolytes as K above 4, mag above 2     #Hypocalcemia in the setting of hypoalbuminemia improved  -Initial 7.8 on 1/6.  -Vitamin D within normal level.  -Calcium carbonate 1250 mg daily    #Normocytic normochromic anemia  -Hb 7.6 with BL of 12.7.  In the setting of open reduction internal fixation.  -Estimated blood loss during procedure was 25 mL  -Ferritin 181, iron level 19 with low TIBC and low transferrin saturation may suggest anemia of chronic disease.  - Patient give One dose of IV venofer   - Started on iron daily.   - Will transfuse if hemoglobin drops less than 7.  - Trend CBC daily.      Chronic conditions:  #HTN  #HLD  #Osteoporosis  -Continue home meds as tolerated       DVT ppx: Resume Lovenox  On Telemetry  Code Status: Full Code  Dispo : Patient is pending pre-CERT to [place]     Assessment and plan discussed with my attending.   Hola Webb MD   Internal Medicine, PGY-1 .

## 2024-01-06 NOTE — PROGRESS NOTES
Occupational Therapy    OT Treatment    Patient Name: Estelle Jung  MRN: [id number]  Today's Date: 1/6/2024  Time Calculation  Start Time: 0930  Stop Time: 1008  Time Calculation (min): 38 min       Assessment:  End of Session Communication: Bedside nurse, Physician  End of Session Patient Position: Up in chair, Alarm on     Plan:  Treatment Interventions: ADL retraining, Functional transfer training, Patient/family training, Equipment evaluation/education, Compensatory technique education  OT Frequency: Daily  Treatment Interventions: ADL retraining, Functional transfer training, Patient/family training, Equipment evaluation/education, Compensatory technique education    Subjective   Previous Visit Info:  OT Last Visit  OT Received On: 01/06/24  General:  General  Reason for Referral: 1/2/24 pt underwent right hip ORIF  Prior to Session Communication: Bedside nurse  Patient Position Received:  (sitting EOB with nursing)  Precautions:  LE Weight Bearing Status: Weight Bearing as Tolerated  Medical Precautions: Fall precautions     Pain:  Pain Assessment  Pain Assessment: 0-10  Pain Score: 5 - Moderate pain  Pain Type: Surgical pain  Pain Location: Hip (thigh)  Pain Orientation: Right    Objective    Cognition:  Cognition  Orientation Level: Oriented X4  Insight: Mild  Impulsive: Mildly     Activities of Daily Living: Grooming  Grooming Level of Assistance:  (hand hygiene while in stance at sink with SBA and fair+ balance)  Grooming Where Assessed: Standing sinkside    LE Dressing  LE Dressing: Yes  Sock Level of Assistance:  (doffed socks with max a.  donned dep)  Adult Briefs Level of Assistance:  (donned underwear with ae and comp tech with mod a to thread BLE through.  pants mgmt while in stance with min a and fair balance)  LE Dressing Where Assessed: Recliner    Toileting  Toileting Level of Assistance: Contact guard  Where Assessed: Toilet  Functional Standing Tolerance:  Functional Standing Tolerance  Comments: patient stood for a total of 5 mins this date with fair/fair+ balance with 2-0 ue support as needed during funcitonal ambulation/transfers and ADL tasks  Bed Mobility/Transfers: Transfer 1  Technique 1: Sit to stand, Stand to sit  Transfer Device 1: Walker, Gait belt  Transfer Level of Assistance 1: Minimum assistance  Trials/Comments 1: from EOB  Transfers 2  Technique 2: Sit to stand  Transfer Device 2: Walker  Transfer Level of Assistance 2: Contact guard  Trials/Comments 2: from Chair  Transfers 3  Transfer Device 3: Walker  Transfer Level of Assistance 3: Contact guard  Trials/Comments 3: functional ambulaiton    Toilet Transfers  Toilet Transfer Type: To and from  Toilet Transfer to: Standard toilet (with BSC frame over toilet)  Toilet Transfer Technique: Ambulating  Toilet Transfers: Minimal assistance  Sitting Balance:  Dynamic Sitting Balance  Dynamic Sitting-Balance:  (fair+)  Standing Balance:  Dynamic Standing Balance  Dynamic Standing-Balance:  (fair/fair+)    Outcome Measures:[facility] Daily Activity  Putting on and taking off regular lower body clothing: A lot  Bathing (including washing, rinsing, drying): A lot  Putting on and taking off regular upper body clothing: A little  Toileting, which includes using toilet, bedpan or urinal: A lot  Taking care of personal grooming such as brushing teeth: A little  Eating Meals: A little  Daily Activity - Total Score: 15    Education Documentation  No documentation found.  Education Comments  No comments found.    IP EDUCATION:  Education  Individual(s) Educated: Patient  Education Provided: Diagnosis & Precautions, Symptom management, Ergonomics and postural realignment, Joint protection and energy conservation, Fall precautons, Risk and benefits of OT discussed with patient or other, POC discussed and agreed upon  Diagnosis and Precautions: hip precautions for pain mgmt.  Equipment:  (AE, EC tech, home DME)  Patient/Caregiver Demonstrated  Understanding: yes  Plan of Care Discussed and Agreed Upon: yes  Patient Response to Education: Patient/Caregiver Verbalized Understanding of Information    Goals:  Encounter Problems       Encounter Problems (Active)       Balance       STG-Patient will be CGA with assistive device dynamic stand task >5 minutes for ADL completion   (Progressing)       Start:  01/03/24    Expected End:  01/17/24               Dressings Lower Extremities       STG - Patient will complete lower body dressing with min assist with AE and comp strategies  (Progressing)       Start:  01/03/24    Expected End:  01/17/24               Mobility       STG-Patient will be CGA with assistive device functional mobility tasks   (Progressing)       Start:  01/03/24    Expected End:  01/17/24               Transfers       STG-Patient will be CGA with functional transfers demonstrating good safety (Progressing)       Start:  01/03/24    Expected End:  01/17/24

## 2024-01-07 PROBLEM — S72.141A CLOSED DISPLACED INTERTROCHANTERIC FRACTURE OF RIGHT FEMUR, INITIAL ENCOUNTER (MULTI): Status: RESOLVED | Noted: 2024-01-01 | Resolved: 2024-01-07

## 2024-01-07 PROBLEM — S72.141A CLOSED DISPLACED INTERTROCHANTERIC FRACTURE OF RIGHT FEMUR (MULTI): Status: RESOLVED | Noted: 2024-01-01 | Resolved: 2024-01-07

## 2024-01-07 LAB
ALBUMIN SERPL BCP-MCNC: 2.7 G/DL (ref 3.4–5)
ALP SERPL-CCNC: 33 U/L (ref 33–136)
ALT SERPL W P-5'-P-CCNC: 10 U/L (ref 7–45)
ANION GAP SERPL CALC-SCNC: 10 MMOL/L (ref 10–20)
AST SERPL W P-5'-P-CCNC: 17 U/L (ref 9–39)
BILIRUB SERPL-MCNC: 0.8 MG/DL (ref 0–1.2)
BUN SERPL-MCNC: 11 MG/DL (ref 6–23)
CALCIUM SERPL-MCNC: 7.8 MG/DL (ref 8.6–10.3)
CHLORIDE SERPL-SCNC: 104 MMOL/L (ref 98–107)
CO2 SERPL-SCNC: 27 MMOL/L (ref 21–32)
CREAT SERPL-MCNC: 0.37 MG/DL (ref 0.5–1.05)
ERYTHROCYTE [DISTWIDTH] IN BLOOD BY AUTOMATED COUNT: 13.5 % (ref 11.5–14.5)
GFR SERPL CREATININE-BSD FRML MDRD: >90 ML/MIN/1.73M*2
GLUCOSE SERPL-MCNC: 103 MG/DL (ref 74–99)
HCT VFR BLD AUTO: 25.5 % (ref 36–46)
HGB BLD-MCNC: 7.9 G/DL (ref 12–16)
HOLD SPECIMEN: NORMAL
MAGNESIUM SERPL-MCNC: 1.74 MG/DL (ref 1.6–2.4)
MCH RBC QN AUTO: 31.7 PG (ref 26–34)
MCHC RBC AUTO-ENTMCNC: 31 G/DL (ref 32–36)
MCV RBC AUTO: 102 FL (ref 80–100)
NRBC BLD-RTO: 0 /100 WBCS (ref 0–0)
PLATELET # BLD AUTO: 385 X10*3/UL (ref 150–450)
POTASSIUM SERPL-SCNC: 3.4 MMOL/L (ref 3.5–5.3)
PROT SERPL-MCNC: 4.9 G/DL (ref 6.4–8.2)
RBC # BLD AUTO: 2.49 X10*6/UL (ref 4–5.2)
SODIUM SERPL-SCNC: 138 MMOL/L (ref 136–145)
WBC # BLD AUTO: 9.6 X10*3/UL (ref 4.4–11.3)

## 2024-01-07 PROCEDURE — 80053 COMPREHEN METABOLIC PANEL: CPT

## 2024-01-07 PROCEDURE — 97112 NEUROMUSCULAR REEDUCATION: CPT | Mod: GO,CO

## 2024-01-07 PROCEDURE — 99232 SBSQ HOSP IP/OBS MODERATE 35: CPT

## 2024-01-07 PROCEDURE — 83735 ASSAY OF MAGNESIUM: CPT

## 2024-01-07 PROCEDURE — 85027 COMPLETE CBC AUTOMATED: CPT

## 2024-01-07 PROCEDURE — 36415 COLL VENOUS BLD VENIPUNCTURE: CPT

## 2024-01-07 PROCEDURE — 97116 GAIT TRAINING THERAPY: CPT | Mod: GP,CQ

## 2024-01-07 PROCEDURE — 2500000001 HC RX 250 WO HCPCS SELF ADMINISTERED DRUGS (ALT 637 FOR MEDICARE OP): Performed by: ORTHOPAEDIC SURGERY

## 2024-01-07 PROCEDURE — 2500000004 HC RX 250 GENERAL PHARMACY W/ HCPCS (ALT 636 FOR OP/ED): Performed by: ORTHOPAEDIC SURGERY

## 2024-01-07 PROCEDURE — 1200000002 HC GENERAL ROOM WITH TELEMETRY DAILY

## 2024-01-07 PROCEDURE — 2500000001 HC RX 250 WO HCPCS SELF ADMINISTERED DRUGS (ALT 637 FOR MEDICARE OP): Performed by: PHYSICIAN ASSISTANT

## 2024-01-07 PROCEDURE — 97110 THERAPEUTIC EXERCISES: CPT | Mod: GP,CQ

## 2024-01-07 PROCEDURE — 2500000004 HC RX 250 GENERAL PHARMACY W/ HCPCS (ALT 636 FOR OP/ED)

## 2024-01-07 PROCEDURE — 97535 SELF CARE MNGMENT TRAINING: CPT | Mod: GO,CO

## 2024-01-07 RX ORDER — POTASSIUM CHLORIDE 20 MEQ/1
40 TABLET, EXTENDED RELEASE ORAL ONCE
Status: COMPLETED | OUTPATIENT
Start: 2024-01-07 | End: 2024-01-07

## 2024-01-07 RX ORDER — IRON POLYSACCHARIDE COMPLEX 150 MG
150 CAPSULE ORAL EVERY OTHER DAY
Qty: 15 CAPSULE | Refills: 0 | Status: SHIPPED | OUTPATIENT
Start: 2024-01-07 | End: 2024-02-06

## 2024-01-07 RX ORDER — ASPIRIN 81 MG/1
81 TABLET ORAL 2 TIMES DAILY
Qty: 60 TABLET | Refills: 0 | Status: SHIPPED | OUTPATIENT
Start: 2024-01-07 | End: 2024-02-06

## 2024-01-07 RX ADMIN — ACETAMINOPHEN 975 MG: 325 TABLET ORAL at 21:28

## 2024-01-07 RX ADMIN — ACETAMINOPHEN 975 MG: 325 TABLET ORAL at 15:00

## 2024-01-07 RX ADMIN — DORZOLAMIDE HYDROCHLORIDE AND TIMOLOL MALEATE 1 DROP: 20; 5 SOLUTION/ DROPS OPHTHALMIC at 21:29

## 2024-01-07 RX ADMIN — ACETAMINOPHEN 975 MG: 325 TABLET ORAL at 09:06

## 2024-01-07 RX ADMIN — Medication 150 MG: at 09:06

## 2024-01-07 RX ADMIN — PRAVASTATIN SODIUM 20 MG: 20 TABLET ORAL at 21:29

## 2024-01-07 RX ADMIN — METOPROLOL SUCCINATE 25 MG: 25 TABLET, EXTENDED RELEASE ORAL at 09:06

## 2024-01-07 RX ADMIN — DORZOLAMIDE HYDROCHLORIDE AND TIMOLOL MALEATE 1 DROP: 20; 5 SOLUTION/ DROPS OPHTHALMIC at 09:07

## 2024-01-07 RX ADMIN — PANTOPRAZOLE SODIUM 20 MG: 20 TABLET, DELAYED RELEASE ORAL at 06:34

## 2024-01-07 RX ADMIN — METOPROLOL SUCCINATE 25 MG: 25 TABLET, EXTENDED RELEASE ORAL at 21:30

## 2024-01-07 RX ADMIN — CALCIUM 1250 MG: 500 TABLET ORAL at 09:06

## 2024-01-07 RX ADMIN — AMLODIPINE BESYLATE 5 MG: 5 TABLET ORAL at 21:29

## 2024-01-07 RX ADMIN — CHOLECALCIFEROL TAB 25 MCG (1000 UNIT) 2000 UNITS: 25 TAB at 21:30

## 2024-01-07 RX ADMIN — CHOLECALCIFEROL TAB 25 MCG (1000 UNIT) 2000 UNITS: 25 TAB at 09:06

## 2024-01-07 RX ADMIN — ENOXAPARIN SODIUM 40 MG: 40 INJECTION SUBCUTANEOUS at 17:30

## 2024-01-07 RX ADMIN — POTASSIUM CHLORIDE 40 MEQ: 1500 TABLET, EXTENDED RELEASE ORAL at 17:30

## 2024-01-07 ASSESSMENT — COGNITIVE AND FUNCTIONAL STATUS - GENERAL
DRESSING REGULAR LOWER BODY CLOTHING: A LOT
CLIMB 3 TO 5 STEPS WITH RAILING: TOTAL
MOVING TO AND FROM BED TO CHAIR: A LITTLE
TURNING FROM BACK TO SIDE WHILE IN FLAT BAD: A LITTLE
HELP NEEDED FOR BATHING: A LOT
STANDING UP FROM CHAIR USING ARMS: A LITTLE
DRESSING REGULAR LOWER BODY CLOTHING: A LOT
STANDING UP FROM CHAIR USING ARMS: A LITTLE
TURNING FROM BACK TO SIDE WHILE IN FLAT BAD: A LOT
PERSONAL GROOMING: A LITTLE
CLIMB 3 TO 5 STEPS WITH RAILING: TOTAL
DRESSING REGULAR UPPER BODY CLOTHING: A LITTLE
WALKING IN HOSPITAL ROOM: A LITTLE
MOVING TO AND FROM BED TO CHAIR: A LITTLE
DAILY ACTIVITIY SCORE: 17
WALKING IN HOSPITAL ROOM: A LITTLE
PERSONAL GROOMING: A LITTLE
MOBILITY SCORE: 15
DAILY ACTIVITIY SCORE: 17
DRESSING REGULAR UPPER BODY CLOTHING: A LITTLE
TOILETING: A LITTLE
HELP NEEDED FOR BATHING: A LOT
MOVING FROM LYING ON BACK TO SITTING ON SIDE OF FLAT BED WITH BEDRAILS: A LITTLE
TOILETING: A LITTLE
MOVING FROM LYING ON BACK TO SITTING ON SIDE OF FLAT BED WITH BEDRAILS: A LITTLE
MOBILITY SCORE: 16

## 2024-01-07 ASSESSMENT — PAIN SCALES - GENERAL
PAINLEVEL_OUTOF10: 2
PAINLEVEL_OUTOF10: 0 - NO PAIN
PAINLEVEL_OUTOF10: 0 - NO PAIN
PAINLEVEL_OUTOF10: 2
PAINLEVEL_OUTOF10: 0 - NO PAIN

## 2024-01-07 ASSESSMENT — PAIN - FUNCTIONAL ASSESSMENT
PAIN_FUNCTIONAL_ASSESSMENT: 0-10

## 2024-01-07 ASSESSMENT — PAIN SCALES - WONG BAKER: WONGBAKER_NUMERICALRESPONSE: NO HURT

## 2024-01-07 ASSESSMENT — ACTIVITIES OF DAILY LIVING (ADL): HOME_MANAGEMENT_TIME_ENTRY: 25

## 2024-01-07 NOTE — DISCHARGE INSTRUCTIONS
Please call and follow up with your primary care provider (PCP) to review hospital course  Please schedule an appointment with Lui Cooper for follow-up regarding right hip fracture    Please take all of your medications as directed.     New medications: Iron polysaccharides 1 tablet every other day.  Change medication dose: Aspirin 81 mg twice daily for 30 days.  Then, take 1 tablet every Monday Wednesday Friday.        If you have any concerning symptoms, or worsening symptoms please call or return, such as chest pain, shortness of breath, new onset confusion, loss of sensation, or fever >103F.    Thank you for allowing us to participate in your health care.    -[facility] [place] [facility].

## 2024-01-07 NOTE — PROGRESS NOTES
Estelle Jung is a 89 y.o. female on day 6 of admission presenting with Closed displaced intertrochanteric fracture of right femur (CMS/HCC).      Subjective   Patient was seen and examined today in the morning at bedside.  No acute events overnight.  Pain is tolerable level.    Objective     Last Recorded Vitals  /61 (BP Location: Right arm, Patient Position: Sitting)   Pulse 86   Temp 36.1 °C (97 °F) (Temporal)   Resp 16   Wt 55.8 kg (123 lb) Comment: zeroed with sheet, 1 blanket, 2 pillows  SpO2 99%   Intake/Output last 3 Shifts:    Intake/Output Summary (Last 24 hours) at 1/7/2024 1509  Last data filed at 1/6/2024 2037  Gross per 24 hour   Intake 480 ml   Output --   Net 480 ml         Admission Weight  Weight: 54.4 kg (120 lb) (01/01/24 1146)    Daily Weight  01/01/24 : 55.8 kg (123 lb)      Physical Exam:  General: Not in acute distress   HEENT: PERRLA, head intact and normocephalic  Neck: Normal to inspection  Lungs: Clear to auscultation, work of breathing within normal limit  Cardiac: Regular rate and rhythm  Abdomen: Soft nontender, positive bowel sounds  : Exam deferred  Skin: Multiple bruises on the left arm and right arm  Hematology: No petechia or excessive ecchymosis  Musculoskeletal: Right hip status post ORIF.  No bleeding or swelling  Neurological: Alert awake oriented, no focal deficit, cranial nerves grossly intact  Psych: No suicidal ideation or homicidal ideation    Relevant Results  Scheduled medications  acetaminophen, 975 mg, oral, TID  amLODIPine, 5 mg, oral, q PM  [Held by provider] aspirin, 81 mg, oral, Every Mon/Wed/Fri  calcium carbonate, 1,250 mg, oral, Daily  cholecalciferol, 2,000 Units, oral, BID  dorzolamide-timoloL, 1 drop, Both Eyes, BID  enoxaparin, 40 mg, subcutaneous, Daily  iron polysaccharides, 150 mg, oral, Daily  metoprolol succinate XL, 25 mg, oral, BID  neomycin-polymyxin-dexAMETHasone, 1 drop, Right Eye, q7 days  pantoprazole, 20 mg, oral, Daily before  breakfast  polyethylene glycol, 17 g, oral, Daily  pravastatin, 20 mg, oral, Nightly      Continuous medications  oxygen, 2 L/min      PRN medications  PRN medications: bisacodyl, hyoscyamine, melatonin, naloxone, ondansetron, traMADol     Results for orders placed or performed during the hospital encounter of 01/01/24 (from the past 24 hour(s))   Comprehensive metabolic panel   Result Value Ref Range    Glucose 103 (H) 74 - 99 mg/dL    Sodium 138 136 - 145 mmol/L    Potassium 3.4 (L) 3.5 - 5.3 mmol/L    Chloride 104 98 - 107 mmol/L    Bicarbonate 27 21 - 32 mmol/L    Anion Gap 10 10 - 20 mmol/L    Urea Nitrogen 11 6 - 23 mg/dL    Creatinine 0.37 (L) 0.50 - 1.05 mg/dL    eGFR >90 >60 mL/min/1.73m*2    Calcium 7.8 (L) 8.6 - 10.3 mg/dL    Albumin 2.7 (L) 3.4 - 5.0 g/dL    Alkaline Phosphatase 33 33 - 136 U/L    Total Protein 4.9 (L) 6.4 - 8.2 g/dL    AST 17 9 - 39 U/L    Bilirubin, Total 0.8 0.0 - 1.2 mg/dL    ALT 10 7 - 45 U/L   Lavender Top   Result Value Ref Range    Extra Tube Hold for add-ons.    CBC   Result Value Ref Range    WBC 9.6 4.4 - 11.3 x10*3/uL    nRBC 0.0 0.0 - 0.0 /100 WBCs    RBC 2.49 (L) 4.00 - 5.20 x10*6/uL    Hemoglobin 7.9 (L) 12.0 - 16.0 g/dL    Hematocrit 25.5 (L) 36.0 - 46.0 %     (H) 80 - 100 fL    MCH 31.7 26.0 - 34.0 pg    MCHC 31.0 (L) 32.0 - 36.0 g/dL    RDW 13.5 11.5 - 14.5 %    Platelets 385 150 - 450 x10*3/uL             FL less than 1 hour    Result Date: 1/2/2024  These images are not reportable by radiology and will not be interpreted by  Radiologists.    XR hip right with pelvis when performed 1 view    Result Date: 1/2/2024  Interpreted By:  Emily Davis, STUDY: XR HIP RIGHT WITH PELVIS WHEN PERFORMED 1 VIEW; ;  1/2/2024 4:49 pm   INDICATION: Signs/Symptoms:incorrect closing count.   COMPARISON: 01/01/2024   ACCESSION NUMBER(S): [id number]   ORDERING CLINICIAN: LOIS ORTA   FINDINGS: Single postoperative frontal radiograph of the right hip. Interval right  femoral IM nail fixation proximally with an interlocking partially threaded cannulated screw extending into the femoral head and neck fixating intertrochanteric comminuted fracture. No retained radiopaque foreign body seen. Atherosclerosis. Gas and swelling in the soft tissues and joint compatible with postoperative changes.       No unexpected retained radiopaque foreign body seen.     MACRO: None   Signed by: Emily Davis 1/2/2024 5:01 PM Dictation workstation:   [id number]    ECG 12 lead    Result Date: 1/2/2024  Sinus bradycardia Inferior infarct (cited on or before 17-FEB-2010) Abnormal ECG When compared with ECG of 29-DEC-2019 03:55, Sinus rhythm has replaced Atrial fibrillation Vent. rate has decreased BY  58 BPM    XR femur right 2+ views    Result Date: 1/2/2024  Interpreted By:  Anjum Sahu, STUDY: XR FEMUR RIGHT 2+ VIEWS; ;  1/1/2024 9:25 pm   INDICATION: Signs/Symptoms:Need full length femur films for surgical planning purposes, prior hip and knee films reviewed.   COMPARISON: Same-day radiographs   ACCESSION NUMBER(S): [id number]   ORDERING CLINICIAN: LOIS ORTA   FINDINGS: Five views of the right femur: Similar alignment of acute comminuted right intertrochanteric fracture with apex-anterolateral angulation and mild impaction. Vascular calcifications. No distal femur fracture. Diffuse osteopenia. Small knee joint effusion. Moderate rectal stool volume.       1. Similar alignment of comminuted intertrochanteric fracture. 2. No distal femur fracture.   Signed by: Anjum Sahu 1/2/2024 12:32 AM Dictation workstation:   [id number]    CT hip right wo IV contrast    Result Date: 1/1/2024  Interpreted By:  Mejia Rich, STUDY: CT HIP RIGHT WO IV CONTRAST;  1/1/2024 4:31 pm   INDICATION: Signs/Symptoms:Further evaluate R femoral neck fx.   COMPARISON: None.   ACCESSION NUMBER(S): [id number]   ORDERING CLINICIAN: CLAIRE CARLSON   TECHNIQUE: Helical trans axial images were obtained  with additional orthogonal reconstructions with bone technique.   FINDINGS: Bony structures:  There is a comminuted and impacted intertrochanteric fracture with varus angulation. There is reticulation indicating edema and hematoma in the surrounding soft tissue. The remaining bony structures are intact.   Joint spaces:  Mild narrowing of the hip joint indicating mild osteoarthritis without osteophytosis. No significant joint effusion.   Tendons and ligaments:  Maintained as visualized.   Musculature and fascia:  Maintained.   Subcutaneous tissue:  Unremarkable without significant edema.   Vasculature:  No significant atherosclerosis.   ADDITIONAL FINDINGS: None significant       Intertrochanteric fracture.   Signed by: Mejia Rcih 1/1/2024 4:56 PM Dictation workstation:   [id number]    XR chest 1 view    Result Date: 1/1/2024  Interpreted By:  Claire Woods, STUDY: XR CHEST 1 VIEW;  1/1/2024 3:40 pm   INDICATION: Signs/Symptoms:Partially visualized lung consolidation on CT C spine.   COMPARISON: 12/29/2019   ACCESSION NUMBER(S): [id number]   ORDERING CLINICIAN: CLAIRE CARLSON   FINDINGS:     The cardiomediastinal silhouette and pulmonary vasculature are within normal limits. Atherosclerotic aorta. No consolidation, pleural effusion or pneumothorax.   No fracture of the left humeral surgical neck.       No acute cardiopulmonary process. Previously described opacities most likely represent volume averaging or atelectasis.     MACRO: None.   Signed by: Claire Woods 1/1/2024 4:23 PM Dictation workstation:   [id number]    XR hip right with pelvis when performed 2 or 3 views    Result Date: 1/1/2024  STUDY: Pelvis and Right Hip Radiographs; 01/01/2024 11:43AM INDICATION: Right hip pain post fall. COMPARISON: None Available. ACCESSION NUMBER(S): [id number] ORDERING CLINICIAN: CLAIRE CARLSON TECHNIQUE:  AP view of the pelvis and two view(s) of the right hip. FINDINGS:  PELVIS: The pelvic ring is intact.  There is a  displaced and angulated fracture involving the right femoral neck.. RIGHT HIP: There is a displaced angulated fracture involving the right femoral neck.. .  There are vascular soft tissue calcifications noted..    1.  Displaced angulated right femoral neck fracture.  No definite associated dislocation.. Signed by Xavi Coy MD    XR knee right 1-2 views    Result Date: 1/1/2024  Interpreted By:  Zbigniew Christensen, STUDY: XR KNEE RIGHT 1-2 VIEWS  1/1/2024 12:42 pm   INDICATION: Signs/Symptoms:Fall, pain   COMPARISON: None.   ACCESSION NUMBER(S): [id number]   ORDERING CLINICIAN: CLAIRE CARLSON   TECHNIQUE: 2 views of the right knee including AP and lateral projections were obtained.   FINDINGS: There is no evidence of acute fracture or dislocation identified. Mild degenerative changes are seen throughout the right knee. No suprapatellar joint effusion is present.       1. No acute fracture or dislocation. 2. Degenerative changes, as described above.   MACRO: None.   Signed by: Zbigniew Christensen 1/1/2024 12:48 PM Dictation workstation:   [id number]    CT cervical spine wo IV contrast    Result Date: 1/1/2024  Interpreted By:  Carmen Barrett, STUDY: CT CERVICAL SPINE WO IV CONTRAST;  1/1/2024 12:27 pm   INDICATION: Signs/Symptoms:Fall, head injury.   COMPARISON: 10/31/2021   ACCESSION NUMBER(S): [id number]   ORDERING CLINICIAN: CLAIRE CARLSON   TECHNIQUE: CT images were obtained through the cervical spine. Sagittal and coronal reconstructions were generated.   FINDINGS:     ALIGNMENT: No significant spondylolisthesis.   VERTEBRAE/DISC SPACES: No compression deformity or acute displaced fracture.  Diffuse osteopenia. Multilevel degenerative changes including the atlanto axial articulation, vertebral endplates and bilateral facet joints.   ADDITIONAL FINDINGS: No abnormal thickening of the prevertebral soft tissues.  Vague interstitial opacity in the medial left apex on the more caudal images. Subcentimeter hypodense nodule in the  right lobe of the thyroid.       No cervical vertebral displacement or acute displaced fracture. Osteopenia and degenerative changes noted.   Partially imaged infiltrate in the medial left apex and subcentimeter hypodense nodule in the right lobe of the thyroid incidentally noted.   MACRO: None.   Signed by: Carmen Barrett 1/1/2024 12:45 PM Dictation workstation:   [id number]    CT head wo IV contrast    Result Date: 1/1/2024  Interpreted By:  Carmen Barrett, STUDY: CT HEAD WO IV CONTRAST;  1/1/2024 12:27 pm   INDICATION: Signs/Symptoms:Fall, head injury.   COMPARISON: 10/31/2021   ACCESSION NUMBER(S): [id number]   ORDERING CLINICIAN: CLAIRE CARLSON   TECHNIQUE: Unenhanced CT images of the head were obtained.   FINDINGS: The ventricles, cisterns and sulci are prominent, consistent with diffuse volume loss. There are areas of nonspecific white matter hypodensity, which are probably age related or microvascular in nature. These findings are similar to the previous exam. There is no acute intracranial hemorrhage, mass effect or midline shift. No extraaxial fluid collection.   No acute displaced calvarial fracture.   Visualized paranasal sinuses are clear.       No acute intracranial hemorrhage or mass-effect.   MACRO: None.   Signed by: Carmen Barrett 1/1/2024 12:39 PM Dictation workstation:   [id number]       Estelle Jung is a 89 y.o. female on day 6 of admission presenting with Closed displaced intertrochanteric fracture of right femur (CMS/HCC).  Assessment/Plan    Active Problems:  There are no active Hospital Problems.    This is a 89 yo female who initially presented to the ED with complaint of right hip pain after mechanical fall.  She has significant medical history of hypertension, hyperlipidemia, osteoporosis and paroxysmal A-fib.  She has been admitted to the hospital for surgery of right intertrochanteric femur.  She underwent operative on 1/2/2024 with uncomplicated postoperative course.     #Right  intertrochanteric femur fracture status post open reduction and internal fixation.  #Mechanical fall  - Postop day 5.  - Diet regular diet  - Orthopedics consulted, appreciate recs  - Pain control: Scheduled acetaminophen, tramadol as needed  - Nausea: IV Zofran as needed  -Patient will be this discharged on aspirin twice daily for DVT prophylaxis per Ortho recommendation.  - PT/OT    #Paroxysmal A-fib not on anticoagulation  -On telemetry  -Patient currently on normal sinus rhythm  -Plan and replace electrolytes as K above 4, mag above 2     #Hypocalcemia in the setting of hypoalbuminemia improved  -Initial 7.8 on 1/6.  -Vitamin D within normal level.  -Calcium carbonate 1250 mg daily    #Normocytic normochromic anemia  -Hb 7.6 with BL of 12.7.  In the setting of open reduction internal fixation.  -Estimated blood loss during procedure was 25 mL  -Ferritin 181, iron level 19 with low TIBC and low transferrin saturation may suggest anemia of chronic disease.  - Patient give One dose of IV venofer   - Started on iron daily.   - Will transfuse if hemoglobin drops less than 7.  - Trend CBC daily.      Chronic conditions:  #HTN  #HLD  #Osteoporosis  -Continue home meds as tolerated       DVT ppx: Resume Lovenox  On Telemetry  Code Status: Full Code  Dispo : Patient is pending pre-[facility] to [place]     Assessment and plan discussed with my attending.   Hola Webb MD   Internal Medicine, PGY-1 .

## 2024-01-07 NOTE — PROGRESS NOTES
Physical Therapy    Physical Therapy Treatment    Patient Name: Estelle Jung  MRN: [id number]  Today's Date: 1/7/2024  Time Calculation  Start Time: 1405  Stop Time: 1430  Time Calculation (min): 25 min       Assessment/Plan   PT Assessment  PT Assessment Results: Decreased strength, Decreased range of motion, Decreased endurance, Impaired balance, Decreased mobility  Rehab Prognosis: Good  End of Session Communication: Bedside nurse  Assessment Comment: Patient remains appropriate for continued therapy upon discharge at a high intensity level to focus on improved functional mobility, balance and activity tolerance.  End of Session Patient Position: Up in chair, Alarm on  PT Plan  Inpatient/Swing Bed or Outpatient: Inpatient  PT Plan  Treatment/Interventions: Bed mobility, Transfer training, Gait training, Balance training, Neuromuscular re-education, Strengthening, Endurance training, Range of motion, Therapeutic exercise, Therapeutic activity, Home exercise program  PT Plan: Skilled PT  PT Frequency: Daily  PT Discharge Recommendations: High intensity level of continued care  Equipment Recommended upon Discharge: Wheeled walker  PT Recommended Transfer Status: Contact guard  PT - OK to Discharge:  (OK, To next level of care when cleared by medicalk team.)    General Visit Information:   PT  Visit  PT Received On: 01/07/24  General  Reason for Referral: 1/2/24 pt underwent right hip ORIF  Referred By: Jayant Rodriguez MD  Past Medical History Relevant to Rehab: Admitted 1/1/2024 after a fall.  Xray right hip revealed displaced angulated right femoral neck fracture.  1/2/2024 patient had right hip ORIF completed by Dr Cooper.  PMH: HTN, HLD, orthostatic hypotension, syncope, kidney surgery  Prior to Session Communication: Bedside nurse  Patient Position Received: Up in chair, Alarm on  Preferred Learning Style: visual, verbal  General Comment:  (RN cleared patient to work with therapy. Patient pleasant and  agreeable to therapy session.)    Subjective   Precautions:  Precautions  LE Weight Bearing Status: Weight Bearing as Tolerated (R LE)    Objective   Pain:  Pain Assessment  Pain Assessment: 0-10  Pain Score: 0 - No pain  Cognition:  Cognition  Overall Cognitive Status: Within Functional Limits  Orientation Level: Oriented X4    Activity Tolerance:  Activity Tolerance  Endurance: Endurance does not limit participation in activity  Treatments:  Therapeutic Exercise  Therapeutic Exercise Performed: Yes  Therapeutic Exercise Activity 1:  (Seated Bilateral LE: ankle pumps, LAQ, HS, HIP ADD with ball, GS x10 each)    Bed Mobility  Bed Mobility: No    Ambulation/Gait Training  Ambulation/Gait Training Performed: Yes  Ambulation/Gait Training 1  Surface 1: Level tile  Device 1: Rolling walker  Gait Support Devices: Gait belt  Assistance 1: Minimum assistance  Quality of Gait 1: Diminished heel strike, Decreased step length, Forward flexed posture  Comments/Distance (ft) 1: 15 feet (chair->toilet)  Ambulation/Gait Training 2  Surface 2: Level tile  Device 2: Rolling walker  Gait Support Devices: Gait belt  Assistance 2: Minimum assistance  Quality of Gait 2: Diminished heel strike, Decreased step length, Forward flexed posture  Comments/Distance (ft) 2: 30 feet  Transfers  Transfer: Yes  Transfer 1  Transfer From 1: Sit to  Transfer to 1: Stand  Transfer Device 1: Walker, Gait belt  Transfer Level of Assistance 1: Contact guard, Minimum assistance  Transfers 2  Transfer From 2: Stand to  Transfer to 2: Sit  Transfer Device 2: Walker  Transfer Level of Assistance 2: Contact guard, Minimum assistance  Trials/Comments 2:  (Required verbal/tactile cues to reach hands back onto armrests prior to descent to increase safety.)    Other Activity  Other Activity Performed: Yes  Other Activity 1:  (Patient IND with christine-care)    Outcome Measures:  [facility] Basic Mobility  Turning from your back to your side while in a flat bed without  using bedrails: A little  Moving from lying on your back to sitting on the side of a flat bed without using bedrails: A lot  Moving to and from bed to chair (including a wheelchair): A little  Standing up from a chair using your arms (e.g. wheelchair or bedside chair): A little  To walk in hospital room: A little  Climbing 3-5 steps with railing: Total  Basic Mobility - Total Score: 15      Encounter Problems       Encounter Problems (Active)       Balance       STG-Patient will be CGA with assistive device dynamic stand task >5 minutes for ADL completion   (Progressing)       Start:  01/03/24    Expected End:  01/17/24               Mobility       STG-Patient will be CGA with assistive device functional mobility tasks   (Progressing)       Start:  01/03/24    Expected End:  01/17/24               PT Problem       Pt will perform bed mobility with min A.   (Progressing)       Start:  01/03/24    Expected End:  01/17/24            Pt will complete sit <> stand and bed <> chair transfers with min A.   (Progressing)       Start:  01/03/24    Expected End:  01/17/24            Pt will ambulate 50 feet min A using FWW with no significant gait deviations.   (Progressing)       Start:  01/03/24    Expected End:  01/17/24            Pt will progress to completing 3 x 20 supine/seated exercises in order to increase strength and improve gait mechanics.   (Progressing)       Start:  01/03/24    Expected End:  01/17/24               Transfers       STG-Patient will be CGA with functional transfers demonstrating good safety (Progressing)       Start:  01/03/24    Expected End:  01/17/24

## 2024-01-08 VITALS
DIASTOLIC BLOOD PRESSURE: 77 MMHG | OXYGEN SATURATION: 100 % | HEIGHT: 62 IN | RESPIRATION RATE: 18 BRPM | HEART RATE: 90 BPM | SYSTOLIC BLOOD PRESSURE: 154 MMHG | BODY MASS INDEX: 22.63 KG/M2 | WEIGHT: 123 LBS | TEMPERATURE: 98.2 F

## 2024-01-08 LAB
ALBUMIN SERPL BCP-MCNC: 2.5 G/DL (ref 3.4–5)
ALP SERPL-CCNC: 34 U/L (ref 33–136)
ALT SERPL W P-5'-P-CCNC: 11 U/L (ref 7–45)
ANION GAP SERPL CALC-SCNC: 11 MMOL/L (ref 10–20)
AST SERPL W P-5'-P-CCNC: 19 U/L (ref 9–39)
BILIRUB SERPL-MCNC: 0.8 MG/DL (ref 0–1.2)
BUN SERPL-MCNC: 11 MG/DL (ref 6–23)
CALCIUM SERPL-MCNC: 7.8 MG/DL (ref 8.6–10.3)
CHLORIDE SERPL-SCNC: 107 MMOL/L (ref 98–107)
CO2 SERPL-SCNC: 25 MMOL/L (ref 21–32)
CREAT SERPL-MCNC: 0.33 MG/DL (ref 0.5–1.05)
ERYTHROCYTE [DISTWIDTH] IN BLOOD BY AUTOMATED COUNT: 13.8 % (ref 11.5–14.5)
GFR SERPL CREATININE-BSD FRML MDRD: >90 ML/MIN/1.73M*2
GLUCOSE SERPL-MCNC: 101 MG/DL (ref 74–99)
HCT VFR BLD AUTO: 24.4 % (ref 36–46)
HGB BLD-MCNC: 7.6 G/DL (ref 12–16)
MAGNESIUM SERPL-MCNC: 1.75 MG/DL (ref 1.6–2.4)
MCH RBC QN AUTO: 31.8 PG (ref 26–34)
MCHC RBC AUTO-ENTMCNC: 31.1 G/DL (ref 32–36)
MCV RBC AUTO: 102 FL (ref 80–100)
NRBC BLD-RTO: 0 /100 WBCS (ref 0–0)
PLATELET # BLD AUTO: 401 X10*3/UL (ref 150–450)
POTASSIUM SERPL-SCNC: 3.9 MMOL/L (ref 3.5–5.3)
PROT SERPL-MCNC: 4.5 G/DL (ref 6.4–8.2)
RBC # BLD AUTO: 2.39 X10*6/UL (ref 4–5.2)
SODIUM SERPL-SCNC: 139 MMOL/L (ref 136–145)
WBC # BLD AUTO: 7.4 X10*3/UL (ref 4.4–11.3)

## 2024-01-08 PROCEDURE — 2500000001 HC RX 250 WO HCPCS SELF ADMINISTERED DRUGS (ALT 637 FOR MEDICARE OP): Performed by: PHYSICIAN ASSISTANT

## 2024-01-08 PROCEDURE — 2500000004 HC RX 250 GENERAL PHARMACY W/ HCPCS (ALT 636 FOR OP/ED)

## 2024-01-08 PROCEDURE — 97535 SELF CARE MNGMENT TRAINING: CPT | Mod: GO,CO

## 2024-01-08 PROCEDURE — 85027 COMPLETE CBC AUTOMATED: CPT

## 2024-01-08 PROCEDURE — 97110 THERAPEUTIC EXERCISES: CPT | Mod: GP,CQ

## 2024-01-08 PROCEDURE — 97116 GAIT TRAINING THERAPY: CPT | Mod: GP,CQ

## 2024-01-08 PROCEDURE — 36415 COLL VENOUS BLD VENIPUNCTURE: CPT

## 2024-01-08 PROCEDURE — 2500000001 HC RX 250 WO HCPCS SELF ADMINISTERED DRUGS (ALT 637 FOR MEDICARE OP): Performed by: STUDENT IN AN ORGANIZED HEALTH CARE EDUCATION/TRAINING PROGRAM

## 2024-01-08 PROCEDURE — 2500000001 HC RX 250 WO HCPCS SELF ADMINISTERED DRUGS (ALT 637 FOR MEDICARE OP): Performed by: ORTHOPAEDIC SURGERY

## 2024-01-08 PROCEDURE — 80053 COMPREHEN METABOLIC PANEL: CPT

## 2024-01-08 PROCEDURE — 99239 HOSP IP/OBS DSCHRG MGMT >30: CPT

## 2024-01-08 PROCEDURE — 83735 ASSAY OF MAGNESIUM: CPT

## 2024-01-08 RX ORDER — NEOMYCIN SULFATE, POLYMYXIN B SULFATE AND DEXAMETHASONE 3.5; 10000; 1 MG/ML; [USP'U]/ML; MG/ML
1 SUSPENSION/ DROPS OPHTHALMIC
Status: DISCONTINUED | OUTPATIENT
Start: 2024-01-08 | End: 2024-01-08 | Stop reason: HOSPADM

## 2024-01-08 RX ADMIN — PANTOPRAZOLE SODIUM 20 MG: 20 TABLET, DELAYED RELEASE ORAL at 06:27

## 2024-01-08 RX ADMIN — ACETAMINOPHEN 975 MG: 325 TABLET ORAL at 14:57

## 2024-01-08 RX ADMIN — ACETAMINOPHEN 975 MG: 325 TABLET ORAL at 08:58

## 2024-01-08 RX ADMIN — DORZOLAMIDE HYDROCHLORIDE AND TIMOLOL MALEATE 1 DROP: 20; 5 SOLUTION/ DROPS OPHTHALMIC at 08:59

## 2024-01-08 RX ADMIN — CHOLECALCIFEROL TAB 25 MCG (1000 UNIT) 2000 UNITS: 25 TAB at 08:58

## 2024-01-08 RX ADMIN — NEOMYCIN SULFATE, POLYMYXIN B SULFATE AND DEXAMETHASONE 1 DROP: 3.5; 10000; 1 SUSPENSION OPHTHALMIC at 14:57

## 2024-01-08 RX ADMIN — METOPROLOL SUCCINATE 25 MG: 25 TABLET, EXTENDED RELEASE ORAL at 08:58

## 2024-01-08 RX ADMIN — CALCIUM 1250 MG: 500 TABLET ORAL at 08:58

## 2024-01-08 RX ADMIN — Medication 150 MG: at 08:58

## 2024-01-08 ASSESSMENT — COGNITIVE AND FUNCTIONAL STATUS - GENERAL
MOVING TO AND FROM BED TO CHAIR: A LITTLE
CLIMB 3 TO 5 STEPS WITH RAILING: TOTAL
WALKING IN HOSPITAL ROOM: A LITTLE
MOBILITY SCORE: 15
TURNING FROM BACK TO SIDE WHILE IN FLAT BAD: A LOT
PERSONAL GROOMING: A LITTLE
STANDING UP FROM CHAIR USING ARMS: A LITTLE
MOVING FROM LYING ON BACK TO SITTING ON SIDE OF FLAT BED WITH BEDRAILS: A LITTLE
DAILY ACTIVITIY SCORE: 17
TOILETING: A LITTLE
DRESSING REGULAR LOWER BODY CLOTHING: A LOT
HELP NEEDED FOR BATHING: A LOT
DRESSING REGULAR UPPER BODY CLOTHING: A LITTLE

## 2024-01-08 ASSESSMENT — ACTIVITIES OF DAILY LIVING (ADL): HOME_MANAGEMENT_TIME_ENTRY: 54

## 2024-01-08 ASSESSMENT — PAIN - FUNCTIONAL ASSESSMENT
PAIN_FUNCTIONAL_ASSESSMENT: 0-10

## 2024-01-08 ASSESSMENT — PAIN SCALES - GENERAL
PAINLEVEL_OUTOF10: 0 - NO PAIN

## 2024-01-08 ASSESSMENT — PAIN SCALES - WONG BAKER: WONGBAKER_NUMERICALRESPONSE: NO HURT

## 2024-01-08 NOTE — PROGRESS NOTES
Physical Therapy    Physical Therapy Treatment    Patient Name: Estelle Jung  MRN: [id number]  Today's Date: 1/8/2024  Time Calculation  Start Time: 1238  Stop Time: 1304  Time Calculation (min): 26 min       Assessment/Plan   PT Assessment  PT Assessment Results: Decreased strength, Decreased range of motion, Decreased endurance, Impaired balance, Decreased mobility  Rehab Prognosis: Good  Evaluation/Treatment Tolerance: Patient tolerated treatment well  Medical Staff Made Aware: Yes  End of Session Communication: Bedside nurse  Assessment Comment: Patient remains appropriate for continued therapy upon discharge at a high intensity level to focus on improved functional mobility, balance and activity tolerance.  End of Session Patient Position: Up in chair, Alarm on  PT Plan  Inpatient/Swing Bed or Outpatient: Inpatient  PT Plan  Treatment/Interventions: Bed mobility, Transfer training, Gait training, Balance training, Neuromuscular re-education, Strengthening, Endurance training, Range of motion, Therapeutic exercise, Therapeutic activity, Home exercise program  PT Plan: Skilled PT  PT Frequency: Daily  PT Discharge Recommendations: High intensity level of continued care  Equipment Recommended upon Discharge: Wheeled walker  PT Recommended Transfer Status: Assist x1 (FWW, gait belt)  PT - OK to Discharge:  (OK, To next level of care when cleared by medicalk team.)     01/08/24 1238   PT  Visit   PT Received On 01/08/24   Response to Previous Treatment Patient with no complaints from previous session.   General   Reason for Referral 1/2/24 pt underwent right hip ORIF   Referred By Jayant Rodriguez MD   Past Medical History Relevant to Rehab Admitted 1/1/2024 after a fall.  Xray right hip revealed displaced angulated right femoral neck fracture.  1/2/2024 patient had right hip ORIF completed by Dr Cooper.  PMH: HTN, HLD, orthostatic hypotension, syncope, kidney surgery   Prior to Session Communication Bedside  nurse   Patient Position Received Up in chair;Alarm on   Preferred Learning Style verbal;visual   General Comment Pt. pleasant and agreeable to therapy session. Nursing cleared for tx.   Pain Assessment   Pain Assessment 0-10   Pain Score 0 - No pain   Cognition   Overall Cognitive Status WFL   Orientation Level Oriented X4   Therapeutic Exercise   Therapeutic Exercise Performed Yes   Therapeutic Exercise Activity 1 AP, LAQ, Ball Squeezes, Resisted Abd., GS x15  (Seated GERA LE)   Ambulation/Gait Training   Ambulation/Gait Training Performed Yes   Ambulation/Gait Training 1   Surface 1 Level tile   Device 1 Rolling walker   Gait Support Devices Gait belt   Assistance 1 Contact guard   Quality of Gait 1 Diminished heel strike;Decreased step length;Forward flexed posture  (VC required for patient to stand up straight and look ahead while ambulating.)   Comments/Distance (ft) 1 30 ft. x3   Transfers   Transfer Yes   Transfer 1   Transfer From 1 Sit to;Chair with arms to   Transfer to 1 Stand   Technique 1 Sit to stand   Transfer Device 1 Gait belt;Walker   Transfer Level of Assistance 1 Contact guard   Trials/Comments 1 x2   Transfers 2   Transfer From 2 Stand to   Transfer to 2 Sit;Chair with arms   Technique 2 Stand to sit   Transfer Device 2 Walker   Transfer Level of Assistance 2 Contact guard   Trials/Comments 2 x1   Activity Tolerance   Endurance Endurance does not limit participation in activity   PT Assessment   PT Assessment Results Decreased strength;Decreased range of motion;Decreased endurance;Impaired balance;Decreased mobility   Rehab Prognosis Good   Evaluation/Treatment Tolerance Patient tolerated treatment well   End of Session Communication Bedside nurse   End of Session Patient Position Up in chair;Alarm on   PT Plan   Inpatient/Swing Bed or Outpatient Inpatient   PT Plan   Treatment/Interventions Bed mobility;Transfer training;Gait training;Balance training;Neuromuscular  re-education;Strengthening;Endurance training;Range of motion;Therapeutic exercise;Therapeutic activity;Home exercise program   PT Plan Skilled PT   PT Frequency Daily   PT Discharge Recommendations High intensity level of continued care   Equipment Recommended upon Discharge Wheeled walker   PT Recommended Transfer Status Assist x1  (FWW, gait belt)     Outcome Measures:  [facility] Basic Mobility  Turning from your back to your side while in a flat bed without using bedrails: A little  Moving from lying on your back to sitting on the side of a flat bed without using bedrails: A lot  Moving to and from bed to chair (including a wheelchair): A little  Standing up from a chair using your arms (e.g. wheelchair or bedside chair): A little  To walk in hospital room: A little  Climbing 3-5 steps with railing: Total  Basic Mobility - Total Score: 15  Education Documentation  No documentation found.  Education Comments  No comments found.            EDUCATION:  Outpatient Education  Individual(s) Educated: Patient  Education Provided: Fall Risk    GOALS:  Encounter Problems       Encounter Problems (Active)       Balance       STG-Patient will be CGA with assistive device dynamic stand task >5 minutes for ADL completion   (Progressing)       Start:  01/03/24    Expected End:  01/17/24               Mobility       STG-Patient will be CGA with assistive device functional mobility tasks   (Progressing)       Start:  01/03/24    Expected End:  01/17/24               PT Problem       Pt will perform bed mobility with min A.   (Progressing)       Start:  01/03/24    Expected End:  01/17/24            Pt will complete sit <> stand and bed <> chair transfers with min A.   (Progressing)       Start:  01/03/24    Expected End:  01/17/24            Pt will ambulate 50 feet min A using FWW with no significant gait deviations.   (Progressing)       Start:  01/03/24    Expected End:  01/17/24            Pt will progress to completing 3 x 20  supine/seated exercises in order to increase strength and improve gait mechanics.   (Progressing)       Start:  01/03/24    Expected End:  01/17/24               Transfers       STG-Patient will be CGA with functional transfers demonstrating good safety (Progressing)       Start:  01/03/24    Expected End:  01/17/24

## 2024-01-08 NOTE — CARE PLAN
Problem: Pain  Goal: My pain/discomfort is manageable  Outcome: Progressing     Problem: Safety  Goal: Patient will be injury free during hospitalization  Outcome: Progressing  Goal: I will remain free of falls  Outcome: Progressing     Problem: Daily Care  Goal: Daily care needs are met  Outcome: Progressing     Problem: Psychosocial Needs  Goal: Demonstrates ability to cope with hospitalization/illness  Outcome: Progressing  Goal: Collaborate with me, my family, and caregiver to identify my specific goals  Outcome: Progressing     Problem: Discharge Barriers  Goal: My discharge needs are met  Outcome: Progressing     Problem: Skin  Goal: Participates in plan/prevention/treatment measures  Outcome: Progressing  Goal: Prevent/manage excess moisture  Outcome: Progressing  Goal: Prevent/minimize sheer/friction injuries  Outcome: Progressing  Goal: Promote/optimize nutrition  Outcome: Progressing     Problem: Dressings Lower Extremities  Goal: STG - Patient will complete lower body dressing with min assist with AE and comp strategies   Outcome: Progressing

## 2024-01-08 NOTE — PROGRESS NOTES
Occupational Therapy    OT Treatment    Patient Name: Estelle Jung  MRN: [id number]  Today's Date: 1/8/2024  Time Calculation  Start Time: 1053  Stop Time: 1147  Time Calculation (min): 54 min         Assessment:  Pt is very pleasant and cooperative, receptive to tx and education.   End of Session Patient Position: Up in chair, Alarm on       Plan:  OT Frequency: Daily  OT Discharge Recommendations: High intensity level of continued care     Subjective      01/08/24 1053   OT Last Visit   OT Received On 01/08/24   General   Reason for Referral 1/2/24 pt underwent right hip ORIF   Prior to Session Communication Bedside nurse   Patient Position Received Up in chair   General Comment Pt agreeable to tx and cleared for participation.   Precautions   LE Weight Bearing Status Weight Bearing as Tolerated  (R LE)   Medical Precautions Fall precautions   Pain Assessment   Pain Assessment 0-10   Pain Score   (did not rate, reports some with activity but improves)   Cognition   Orientation Level Oriented X4   Grooming   Grooming Level of Assistance Contact guard   Grooming Where Assessed Standing sinkside   Grooming Comments Pt completed various grooming tasks standing at sink with CGA overall, intermittent UE support on counter, cues to increase safety in stance with fair follow through.   LE Dressing   LE Dressing Adaptive Equipment Reacher;Sock aide   Sock Level of Assistance Close supervision;Moderate verbal cues   LE Dressing Where Assessed Recliner   LE Dressing Comments Pt educated in use of AE, compensatory techniques and EC with emphasis on LB dressing tasks in order to maximize safety, I and decrease pain.    Pt completed return demo using AE to doff/don socks with mod cues for proper technique. Pt verbalized understanding to don R LE first, did not trial at this time.    Pt receptive to use of AE, provided handout with information written for options to obtain with verbal understanding. Educated in benefits of  seated bathing routine, receptive and provided info to obtain shower chair.      Toileting   Toileting Level of Assistance Contact guard   Where Assessed Toilet  (RTS)   Toileting Comments Pt CGA during toileting task, cues for UE support on AD as pt forward flexed to complete rear christine care in stance due to tight space on commode.   Ambulation/Gait Training   Ambulation/Gait Training Performed Yes   Ambulation/Gait Training 1   Assistance 1 Contact guard   Comments/Distance (ft) 1 Pt ambulated household distances within room, to and from bathroom, at fww level with CGA, fair safety overall, one cue upon return to recliner for proper approach with good follow through.   Transfer 1   Technique 1 Sit to stand;Stand to sit   Transfer Device 1 Gait belt;Walker   Transfer Level of Assistance 1 Contact guard   Trials/Comments 1 STS from various surfaces including recliner and raised toilet seat within bathroom with CGA, min cues for safety including remaining  within fww until lined up with seated surface with follow through.   Static Sitting Balance   Static Sitting-Comment/Number of Minutes good   Dynamic Sitting Balance   Dynamic Sitting-Comments good   Static Standing Balance   Static Standing-Comment/Number of Minutes fair   Dynamic Standing Balance   Dynamic Standing-Comments fair   IP OT Assessment   End of Session Patient Position Up in chair;Alarm on   Inpatient Plan   OT Frequency Daily   OT Discharge Recommendations High intensity level of continued care     Outcome Measures:[facility] Daily Activity  Putting on and taking off regular lower body clothing: A lot  Bathing (including washing, rinsing, drying): A lot  Putting on and taking off regular upper body clothing: A little  Toileting, which includes using toilet, bedpan or urinal: A little  Taking care of personal grooming such as brushing teeth: A little  Eating Meals: None  Daily Activity - Total Score: 17  Education Documentation  ADL Training, taught by  ROME Spence at 1/8/2024 12:17 PM.  Learner: Patient  Readiness: Acceptance  Method: Explanation  Response: Verbalizes Understanding, Demonstrated Understanding  Comment: functional transfer safety, AE use, compensatory techniques and recommended DME    Education Comments  No comments found.            Goals:  Encounter Problems       Encounter Problems (Active)       Balance       STG-Patient will be CGA with assistive device dynamic stand task >5 minutes for ADL completion   (Progressing)       Start:  01/03/24    Expected End:  01/17/24               Dressings Lower Extremities       STG - Patient will complete lower body dressing with min assist with AE and comp strategies  (Progressing)       Start:  01/03/24    Expected End:  01/17/24               Mobility       STG-Patient will be CGA with assistive device functional mobility tasks   (Progressing)       Start:  01/03/24    Expected End:  01/17/24               Transfers       STG-Patient will be CGA with functional transfers demonstrating good safety (Progressing)       Start:  01/03/24    Expected End:  01/17/24

## 2024-01-08 NOTE — PROGRESS NOTES
Authorization for [facility] received.  Wheelchair ambulette for transport is agreeable with patient . Patient will call sister.   Iliana Avendaño RN

## 2024-01-08 NOTE — PROGRESS NOTES
"  Estelle Jung is a 89 y.o. female on day 7 of admission presenting with Closed displaced intertrochanteric fracture of right femur (CMS/HCC).    Subjective   Patient up in chair this morning.  Reports pain continues to improve.  Continues to work with PT.  Denies any other issues.  Awaiting placement to facility.      Objective     Physical Exam  Constitutional:       Appearance: Normal appearance.   HENT:      Head: Normocephalic and atraumatic.      Nose: Nose normal.      Mouth/Throat:      Mouth: Mucous membranes are moist.   Cardiovascular:      Rate and Rhythm: Normal rate and regular rhythm.   Pulmonary:      Effort: Pulmonary effort is normal.      Breath sounds: Normal breath sounds.   Abdominal:      General: Abdomen is flat. Bowel sounds are normal.      Palpations: Abdomen is soft.   Musculoskeletal:      Cervical back: Neck supple.      Comments: Right lower extremity MSK  Right hip dressing clean dry and intact, some bruising noted on lateral thigh  Ankle PF/DF intact  DP/PT pulse 2+  Sensation intact to light touch in the right lower extremity  Able to gently flex hip   Skin:     General: Skin is warm and dry.   Neurological:      General: No focal deficit present.      Mental Status: She is alert and oriented to person, place, and time.   Psychiatric:         Mood and Affect: Mood normal.         Last Recorded Vitals  Blood pressure 126/59, pulse 90, temperature 36.3 °C (97.3 °F), temperature source Temporal, resp. rate 18, height 1.575 m (5' 2\"), weight 55.8 kg (123 lb), SpO2 95 %.  Intake/Output last 3 Shifts:  I/O last 3 completed shifts:  In: 1080 (19.4 mL/kg) [P.O.:1080]  Out: - (0 mL/kg)   Weight: 55.8 kg     Relevant Results  Results for orders placed or performed during the hospital encounter of 01/01/24 (from the past 24 hour(s))   Comprehensive metabolic panel   Result Value Ref Range    Glucose 103 (H) 74 - 99 mg/dL    Sodium 138 136 - 145 mmol/L    Potassium 3.4 (L) 3.5 - 5.3 mmol/L "    Chloride 104 98 - 107 mmol/L    Bicarbonate 27 21 - 32 mmol/L    Anion Gap 10 10 - 20 mmol/L    Urea Nitrogen 11 6 - 23 mg/dL    Creatinine 0.37 (L) 0.50 - 1.05 mg/dL    eGFR >90 >60 mL/min/1.73m*2    Calcium 7.8 (L) 8.6 - 10.3 mg/dL    Albumin 2.7 (L) 3.4 - 5.0 g/dL    Alkaline Phosphatase 33 33 - 136 U/L    Total Protein 4.9 (L) 6.4 - 8.2 g/dL    AST 17 9 - 39 U/L    Bilirubin, Total 0.8 0.0 - 1.2 mg/dL    ALT 10 7 - 45 U/L   Lavender Top   Result Value Ref Range    Extra Tube Hold for add-ons.    CBC   Result Value Ref Range    WBC 9.6 4.4 - 11.3 x10*3/uL    nRBC 0.0 0.0 - 0.0 /100 WBCs    RBC 2.49 (L) 4.00 - 5.20 x10*6/uL    Hemoglobin 7.9 (L) 12.0 - 16.0 g/dL    Hematocrit 25.5 (L) 36.0 - 46.0 %     (H) 80 - 100 fL    MCH 31.7 26.0 - 34.0 pg    MCHC 31.0 (L) 32.0 - 36.0 g/dL    RDW 13.5 11.5 - 14.5 %    Platelets 385 150 - 450 x10*3/uL   Magnesium   Result Value Ref Range    Magnesium 1.74 1.60 - 2.40 mg/dL   CBC   Result Value Ref Range    WBC 7.4 4.4 - 11.3 x10*3/uL    nRBC 0.0 0.0 - 0.0 /100 WBCs    RBC 2.39 (L) 4.00 - 5.20 x10*6/uL    Hemoglobin 7.6 (L) 12.0 - 16.0 g/dL    Hematocrit 24.4 (L) 36.0 - 46.0 %     (H) 80 - 100 fL    MCH 31.8 26.0 - 34.0 pg    MCHC 31.1 (L) 32.0 - 36.0 g/dL    RDW 13.8 11.5 - 14.5 %    Platelets 401 150 - 450 x10*3/uL   Comprehensive metabolic panel   Result Value Ref Range    Glucose 101 (H) 74 - 99 mg/dL    Sodium 139 136 - 145 mmol/L    Potassium 3.9 3.5 - 5.3 mmol/L    Chloride 107 98 - 107 mmol/L    Bicarbonate 25 21 - 32 mmol/L    Anion Gap 11 10 - 20 mmol/L    Urea Nitrogen 11 6 - 23 mg/dL    Creatinine 0.33 (L) 0.50 - 1.05 mg/dL    eGFR >90 >60 mL/min/1.73m*2    Calcium 7.8 (L) 8.6 - 10.3 mg/dL    Albumin 2.5 (L) 3.4 - 5.0 g/dL    Alkaline Phosphatase 34 33 - 136 U/L    Total Protein 4.5 (L) 6.4 - 8.2 g/dL    AST 19 9 - 39 U/L    Bilirubin, Total 0.8 0.0 - 1.2 mg/dL    ALT 11 7 - 45 U/L   Magnesium   Result Value Ref Range    Magnesium 1.75 1.60 - 2.40  mg/dL     Scheduled medications  acetaminophen, 975 mg, oral, TID  amLODIPine, 5 mg, oral, q PM  [Held by provider] aspirin, 81 mg, oral, Every Mon/Wed/Fri  calcium carbonate, 1,250 mg, oral, Daily  cholecalciferol, 2,000 Units, oral, BID  dorzolamide-timoloL, 1 drop, Both Eyes, BID  enoxaparin, 40 mg, subcutaneous, Daily  iron polysaccharides, 150 mg, oral, Daily  metoprolol succinate XL, 25 mg, oral, BID  neomycin-polymyxin-dexAMETHasone, 1 drop, Right Eye, q7 days  pantoprazole, 20 mg, oral, Daily before breakfast  polyethylene glycol, 17 g, oral, Daily  pravastatin, 20 mg, oral, Nightly      Continuous medications  oxygen, 2 L/min      PRN medications  PRN medications: bisacodyl, hyoscyamine, melatonin, naloxone, ondansetron, traMADol    Assessment/Plan   Active Problems:  There are no active Hospital Problems.    Postop day 6 of right femur cephallomedullary nailing   Physical and Occupational Therapy are on consult  Weightbearing as tolerated with walker  Lovenox while inpatient and transition to aspirin 81 mg p.o. twice daily x 30 days postoperatively at discharge for VTE prophylaxis  Hgb stable at 7.4  Multimodal pain regimen  Home medications ordered for chronic medical conditions as appropriate  Bowel regimen  Encourage incentive spirometry  Awaiting discharge to facility   Follow-up in my office 2 weeks post-op       I spent 20 minutes in the professional and overall care of this patient.      Lui Cooper MD

## 2024-01-08 NOTE — DISCHARGE SUMMARY
Discharge Diagnosis  Closed displaced intertrochanteric fracture of right femur (CMS/HCC)    Issues Requiring Follow-Up  Follow-up with Dr. Cooper regarding the right hip surgery  Follow-up with your primary doctor regarding posthospital care.    Discharge Meds     Your medication list        START taking these medications        Instructions Last Dose Given Next Dose Due   iron polysaccharides 150 mg iron capsule  Commonly known as: Nu-Iron,Niferex      Take 1 capsule (150 mg) by mouth every other day.              CHANGE how you take these medications        Instructions Last Dose Given Next Dose Due   aspirin 81 mg EC tablet  What changed:   when to take this  additional instructions      Take 1 tablet (81 mg) by mouth 2 times a day. Take 1 tablet twice daily for 30 days, then take 1 tablet every Monday Wednesday Friday.              CONTINUE taking these medications        Instructions Last Dose Given Next Dose Due   amLODIPine 5 mg tablet  Commonly known as: Norvasc           calcium carbonate 600 mg calcium (1,500 mg) tablet           cholecalciferol 50 MCG (2000 UT) tablet  Commonly known as: Vitamin D-3           clobetasol 0.05 % topical foam  Commonly known as: Olux           coenzyme Q-10 100 mg capsule           diclofenac sodium 1 % gel gel  Commonly known as: Voltaren           dorzolamide-timoloL 22.3-6.8 mg/mL ophthalmic solution  Commonly known as: Cosopt           hyoscyamine 0.125 mg disintegrating tablet  Commonly known as: Anaspaz           metoprolol succinate XL 25 mg 24 hr tablet  Commonly known as: Toprol-XL      Take 1 tablet (25 mg) by mouth 2 times a day.       metroNIDAZOLE 1 % gel  Commonly known as: Metrogel           multivitamin with minerals tablet           neomycin-polymyxin-dexAMETHasone 3.5mg/mL-10,000 unit/mL-0.1 % ophthalmic suspension  Commonly known as: Maxitrol           omeprazole 20 mg DR capsule  Commonly known as: PriLOSEC           pravastatin 20 mg tablet  Commonly  known as: Pravachol      Take 1 tablet (20 mg) by mouth once daily at bedtime.                 Where to Get Your Medications        These medications were sent to [facility] ([facility] #[id number] - [place], [place] - [address]  [address] [facility] [address]      Phone: [phone]   aspirin 81 mg EC tablet  iron polysaccharides 150 mg iron capsule         Test Results Pending At Discharge  Pending Labs       No current pending labs.            Hospital Course  Estelle Jung, a 89-year-old female with a history of hypertension, hyperlipidemia, osteoporosis, and paroxysmal A-fib, presented to the ED with right hip pain following a mechanical fall. She denied loss of consciousness or other symptoms.     ED course  ED findings included stable vital signs with elevated blood pressure (190/83), labs showing hyponatremia (135), and an EKG indicating sinus bradycardia (HR 55, QTc 413). Imaging revealed a right femoral neck fracture. Ortho consultation led to a CT head, confirming an intertrochanteric fracture. She was admitted for ORIF     Hospital stay  In the next days she underwent open reduction internal fixation of the right hip with normal postoperative course.  Physical therapy and Occupational Therapy recommended high intensity physical therapy.  Prior orthopedic recommendation,  aspirin 81 mg p.o. twice daily x 30 days at discharge for VTE prophylaxis. Patient continued to improve clinically and was deemed medically stable for discharge. Patient was discharged in stable condition, with plans to follow-up with the PCP clinic and Dr. Cooper for further care.         Pertinent Physical Exam At Time of Discharge  Physical Exam  General: Not in acute distress   HEENT: PERRLA, head intact and normocephalic  Neck: Normal to inspection  Lungs: Clear to auscultation, work of breathing within normal limit  Cardiac: Regular rate and rhythm  Abdomen: Soft nontender, positive bowel sounds  : Exam deferred  Skin:  Multiple bruises on the left arm and right arm  Hematology: No petechia or excessive ecchymosis  Musculoskeletal: Right hip status post ORIF.  No bleeding or swelling  Neurological: Alert awake oriented, no focal deficit, cranial nerves grossly intact  Psych: No suicidal ideation or homicidal ideation  Outpatient Follow-Up  No future appointments.      Charanjit Rodríguez MD

## 2024-01-10 ENCOUNTER — NURSING HOME VISIT (OUTPATIENT)
Dept: POST ACUTE CARE | Facility: EXTERNAL LOCATION | Age: 89
End: 2024-01-10
Payer: MEDICARE

## 2024-01-10 DIAGNOSIS — S72.141D CLOSED DISPLACED INTERTROCHANTERIC FRACTURE OF RIGHT FEMUR WITH ROUTINE HEALING: ICD-10-CM

## 2024-01-10 DIAGNOSIS — Z86.79 ATRIAL FIBRILLATION, CURRENTLY IN SINUS RHYTHM: ICD-10-CM

## 2024-01-10 DIAGNOSIS — I10 HTN (HYPERTENSION), BENIGN: Primary | ICD-10-CM

## 2024-01-10 PROCEDURE — 99305 1ST NF CARE MODERATE MDM 35: CPT | Performed by: STUDENT IN AN ORGANIZED HEALTH CARE EDUCATION/TRAINING PROGRAM

## 2024-01-10 ASSESSMENT — ENCOUNTER SYMPTOMS
PSYCHIATRIC NEGATIVE: 1
GASTROINTESTINAL NEGATIVE: 1
CARDIOVASCULAR NEGATIVE: 1
MUSCULOSKELETAL NEGATIVE: 1
RESPIRATORY NEGATIVE: 1
NEUROLOGICAL NEGATIVE: 1
CONSTITUTIONAL NEGATIVE: 1

## 2024-01-10 NOTE — LETTER
Patient: Estelle Jung  : 10/5/1932    Encounter Date: 01/10/2024    Subjective  Patient ID: Estelle Jung is a 89 y.o. female.    Patient is an 89-year-old female with past medical history of hypertension, hyperlipidemia, osteoporosis, paroxysmal A-fib who presents to the ED with complaints of fall and right hip pain.  She was noted to have a mechanical fall in nature did not hit her head or lose any consciousness.  In regard no constitutional symptoms.  In the ED, was found to have right intertrochanteric femur fracture.  Was recommended for surgical fixation and was admitted for further management.  Patient had a noncomplicated hospital course and was discharged to facility in stable condition.  On evaluation, patient regards that she is overall doing well and that her pain is overall well-controlled.  He is trying to work better with therapy and trying to ambulate around her room however still feels weak in general.  Otherwise, states no additional issues or concerns at this time.        Review of Systems   Constitutional: Negative.    HENT: Negative.     Respiratory: Negative.     Cardiovascular: Negative.    Gastrointestinal: Negative.    Musculoskeletal: Negative.    Neurological: Negative.    Psychiatric/Behavioral: Negative.         ObjectiveVitals Reviewed via facility EMR   Physical Exam  Constitutional:       General: She is not in acute distress.     Appearance: She is not ill-appearing.   Eyes:      Pupils: Pupils are equal, round, and reactive to light.   Cardiovascular:      Rate and Rhythm: Normal rate and regular rhythm.      Pulses: Normal pulses.      Heart sounds: No murmur heard.  Pulmonary:      Effort: No respiratory distress.      Breath sounds: No wheezing.   Abdominal:      General: Abdomen is flat. Bowel sounds are normal. There is no distension.   Musculoskeletal:      Right lower leg: No edema.      Left lower leg: No edema.   Skin:     General: Skin is warm and dry.    Neurological:      Mental Status: She is alert. Mental status is at baseline.      Cranial Nerves: No cranial nerve deficit.      Motor: No weakness.   Psychiatric:         Mood and Affect: Mood normal.         Behavior: Behavior normal.         Assessment/Plan  Diagnoses and all orders for this visit:  HTN (hypertension), benign  Atrial fibrillation, currently in sinus rhythm  Closed displaced intertrochanteric fracture of right femur with routine healing    Patient seen and examined, hospital course reviewed.  Overall noncomplicated hospital course noted.  Continue to monitor hemoglobin and suspect underlying anemia secondary to surgical procedure.  Continue with PT OT, pain control, weekly labs.  Updated staff with care plan and are agreeable.    Reviewed and approved by LOPEZ RODRIGUEZ on 1/10/24 at 11:24 PM.       Electronically Signed By: Lopez Rodriguez DO   1/10/24 11:25 PM

## 2024-01-11 NOTE — PROGRESS NOTES
Subjective   Patient ID: Estelle Jung is a 89 y.o. female.    Patient is an 89-year-old female with past medical history of hypertension, hyperlipidemia, osteoporosis, paroxysmal A-fib who presents to the ED with complaints of fall and right hip pain.  She was noted to have a mechanical fall in nature did not hit her head or lose any consciousness.  In regard no constitutional symptoms.  In the ED, was found to have right intertrochanteric femur fracture.  Was recommended for surgical fixation and was admitted for further management.  Patient had a noncomplicated hospital course and was discharged to facility in stable condition.  On evaluation, patient regards that she is overall doing well and that her pain is overall well-controlled.  He is trying to work better with therapy and trying to ambulate around her room however still feels weak in general.  Otherwise, states no additional issues or concerns at this time.        Review of Systems   Constitutional: Negative.    HENT: Negative.     Respiratory: Negative.     Cardiovascular: Negative.    Gastrointestinal: Negative.    Musculoskeletal: Negative.    Neurological: Negative.    Psychiatric/Behavioral: Negative.         Objective Vitals Reviewed via facility EMR   Physical Exam  Constitutional:       General: She is not in acute distress.     Appearance: She is not ill-appearing.   Eyes:      Pupils: Pupils are equal, round, and reactive to light.   Cardiovascular:      Rate and Rhythm: Normal rate and regular rhythm.      Pulses: Normal pulses.      Heart sounds: No murmur heard.  Pulmonary:      Effort: No respiratory distress.      Breath sounds: No wheezing.   Abdominal:      General: Abdomen is flat. Bowel sounds are normal. There is no distension.   Musculoskeletal:      Right lower leg: No edema.      Left lower leg: No edema.   Skin:     General: Skin is warm and dry.   Neurological:      Mental Status: She is alert. Mental status is at baseline.       Cranial Nerves: No cranial nerve deficit.      Motor: No weakness.   Psychiatric:         Mood and Affect: Mood normal.         Behavior: Behavior normal.         Assessment/Plan   Diagnoses and all orders for this visit:  HTN (hypertension), benign  Atrial fibrillation, currently in sinus rhythm  Closed displaced intertrochanteric fracture of right femur with routine healing    Patient seen and examined, hospital course reviewed.  Overall noncomplicated hospital course noted.  Continue to monitor hemoglobin and suspect underlying anemia secondary to surgical procedure.  Continue with PT OT, pain control, weekly labs.  Updated staff with care plan and are agreeable.    Reviewed and approved by JACKY RODRIGUEZ on 1/10/24 at 11:24 PM.

## 2024-01-12 ENCOUNTER — NURSING HOME VISIT (OUTPATIENT)
Dept: POST ACUTE CARE | Facility: EXTERNAL LOCATION | Age: 89
End: 2024-01-12
Payer: MEDICARE

## 2024-01-12 DIAGNOSIS — I10 HTN (HYPERTENSION), BENIGN: Primary | ICD-10-CM

## 2024-01-12 DIAGNOSIS — Z86.79 ATRIAL FIBRILLATION, CURRENTLY IN SINUS RHYTHM: ICD-10-CM

## 2024-01-12 DIAGNOSIS — S72.141D CLOSED DISPLACED INTERTROCHANTERIC FRACTURE OF RIGHT FEMUR WITH ROUTINE HEALING: ICD-10-CM

## 2024-01-12 PROCEDURE — 99308 SBSQ NF CARE LOW MDM 20: CPT | Performed by: STUDENT IN AN ORGANIZED HEALTH CARE EDUCATION/TRAINING PROGRAM

## 2024-01-12 ASSESSMENT — ENCOUNTER SYMPTOMS
MUSCULOSKELETAL NEGATIVE: 1
GASTROINTESTINAL NEGATIVE: 1
RESPIRATORY NEGATIVE: 1
NEUROLOGICAL NEGATIVE: 1
PSYCHIATRIC NEGATIVE: 1
CONSTITUTIONAL NEGATIVE: 1
CARDIOVASCULAR NEGATIVE: 1

## 2024-01-12 NOTE — LETTER
Patient: Estelle Jung  : 10/5/1932    Encounter Date: 2024    Subjective  Patient ID: Estelle Jung is a 89 y.o. female.    Patient seen and examined at bedside.  She regards that she is doing overall well with therapy.  Regards that she does have some pain when she does do her therapy sessions.  Is taking as needed Tylenol, however often times forgets to ask for it before therapy sessions.  Otherwise, reports no additional issues and overall feels well.        Review of Systems   Constitutional: Negative.    HENT: Negative.     Respiratory: Negative.     Cardiovascular: Negative.    Gastrointestinal: Negative.    Musculoskeletal: Negative.    Neurological: Negative.    Psychiatric/Behavioral: Negative.         ObjectiveVitals Reviewed via facility EMR   Physical Exam  Constitutional:       General: She is not in acute distress.     Appearance: She is not ill-appearing.   Eyes:      Pupils: Pupils are equal, round, and reactive to light.   Cardiovascular:      Rate and Rhythm: Normal rate and regular rhythm.      Pulses: Normal pulses.      Heart sounds: No murmur heard.  Pulmonary:      Effort: No respiratory distress.      Breath sounds: No wheezing.   Abdominal:      General: Abdomen is flat. Bowel sounds are normal. There is no distension.   Musculoskeletal:      Right lower leg: No edema.      Left lower leg: No edema.   Skin:     General: Skin is warm and dry.   Neurological:      Mental Status: She is alert. Mental status is at baseline.      Cranial Nerves: No cranial nerve deficit.      Motor: No weakness.   Psychiatric:         Mood and Affect: Mood normal.         Behavior: Behavior normal.         Assessment/Plan  Diagnoses and all orders for this visit:  HTN (hypertension), benign  Atrial fibrillation, currently in sinus rhythm  Closed displaced intertrochanteric fracture of right femur with routine healing  Patient seen and examined, will recommend initiation of Tylenol prior to therapy  sessions.  Continue supportive care, labs reviewed and overall stable.  No additional issues at this time.    Reviewed and approved by LOPEZ RODRIGUEZ on 1/12/24 at 9:20 PM.       Electronically Signed By: Lopez Rodriguez DO   1/12/24  9:20 PM

## 2024-01-13 NOTE — PROGRESS NOTES
Subjective   Patient ID: Estelle Jung is a 89 y.o. female.    Patient seen and examined at bedside.  She regards that she is doing overall well with therapy.  Regards that she does have some pain when she does do her therapy sessions.  Is taking as needed Tylenol, however often times forgets to ask for it before therapy sessions.  Otherwise, reports no additional issues and overall feels well.        Review of Systems   Constitutional: Negative.    HENT: Negative.     Respiratory: Negative.     Cardiovascular: Negative.    Gastrointestinal: Negative.    Musculoskeletal: Negative.    Neurological: Negative.    Psychiatric/Behavioral: Negative.         Objective Vitals Reviewed via facility EMR   Physical Exam  Constitutional:       General: She is not in acute distress.     Appearance: She is not ill-appearing.   Eyes:      Pupils: Pupils are equal, round, and reactive to light.   Cardiovascular:      Rate and Rhythm: Normal rate and regular rhythm.      Pulses: Normal pulses.      Heart sounds: No murmur heard.  Pulmonary:      Effort: No respiratory distress.      Breath sounds: No wheezing.   Abdominal:      General: Abdomen is flat. Bowel sounds are normal. There is no distension.   Musculoskeletal:      Right lower leg: No edema.      Left lower leg: No edema.   Skin:     General: Skin is warm and dry.   Neurological:      Mental Status: She is alert. Mental status is at baseline.      Cranial Nerves: No cranial nerve deficit.      Motor: No weakness.   Psychiatric:         Mood and Affect: Mood normal.         Behavior: Behavior normal.         Assessment/Plan   Diagnoses and all orders for this visit:  HTN (hypertension), benign  Atrial fibrillation, currently in sinus rhythm  Closed displaced intertrochanteric fracture of right femur with routine healing  Patient seen and examined, will recommend initiation of Tylenol prior to therapy sessions.  Continue supportive care, labs reviewed and overall stable.   No additional issues at this time.    Reviewed and approved by JACKY RODRIGUEZ on 1/12/24 at 9:20 PM.

## 2024-01-16 ENCOUNTER — NURSING HOME VISIT (OUTPATIENT)
Dept: POST ACUTE CARE | Facility: EXTERNAL LOCATION | Age: 89
End: 2024-01-16
Payer: MEDICARE

## 2024-01-16 DIAGNOSIS — M79.89 RIGHT LEG SWELLING: ICD-10-CM

## 2024-01-16 DIAGNOSIS — Z86.79 ATRIAL FIBRILLATION, CURRENTLY IN SINUS RHYTHM: ICD-10-CM

## 2024-01-16 DIAGNOSIS — I10 HTN (HYPERTENSION), BENIGN: Primary | ICD-10-CM

## 2024-01-16 DIAGNOSIS — M79.89 LEFT UPPER EXTREMITY SWELLING: ICD-10-CM

## 2024-01-16 DIAGNOSIS — S72.141D CLOSED DISPLACED INTERTROCHANTERIC FRACTURE OF RIGHT FEMUR WITH ROUTINE HEALING: ICD-10-CM

## 2024-01-16 PROCEDURE — 99309 SBSQ NF CARE MODERATE MDM 30: CPT | Performed by: STUDENT IN AN ORGANIZED HEALTH CARE EDUCATION/TRAINING PROGRAM

## 2024-01-16 NOTE — LETTER
Patient: Estelle Jung  : 10/5/1932    Encounter Date: 2024    Subjective  Patient ID: Estelle Jung is a 89 y.o. female.    Patient seen and examined at bedside.  Regards that she has been having some lower extremity swelling in operative leg as of late.  Recent ultrasound has been negative, however states that due to increasing her physical therapy.  Regards that her pain is well-controlled, regards no constitutional symptoms or concerns at this time.        Review of Systems   Constitutional: Negative.    HENT: Negative.     Respiratory: Negative.     Cardiovascular: Negative.    Gastrointestinal: Negative.    Musculoskeletal: Negative.    Neurological: Negative.    Psychiatric/Behavioral: Negative.         ObjectiveVitals Reviewed via facility EMR   Physical Exam  Constitutional:       General: She is not in acute distress.     Appearance: She is not ill-appearing.   Eyes:      Pupils: Pupils are equal, round, and reactive to light.   Cardiovascular:      Rate and Rhythm: Normal rate and regular rhythm.      Pulses: Normal pulses.      Heart sounds: No murmur heard.  Pulmonary:      Effort: No respiratory distress.      Breath sounds: No wheezing.   Abdominal:      General: Abdomen is flat. Bowel sounds are normal. There is no distension.   Musculoskeletal:      Right lower leg: No edema.      Left lower leg: No edema.      Comments: Slight swelling of RLE   Skin:     General: Skin is warm and dry.   Neurological:      Mental Status: She is alert. Mental status is at baseline.      Cranial Nerves: No cranial nerve deficit.      Motor: No weakness.   Psychiatric:         Mood and Affect: Mood normal.         Behavior: Behavior normal.         Assessment/Plan  Diagnoses and all orders for this visit:  HTN (hypertension), benign  Atrial fibrillation, currently in sinus rhythm  Left upper extremity swelling  Closed displaced intertrochanteric fracture of right femur with routine healing  Right leg  swelling      Patient seen and examined, suspect that patient's underlying swelling is overall secondary to increased mobility and working with therapy.  This likely should improve with time as she gets further out from her surgery date.  We will initiate nonpharmacological measures such as utilization of ice packs.  We did discuss potential use for medications however she defers at this time.  Low suspicion for gout or infectious etiologies as patient is able to flex and ambulate on her leg without any underlying symptoms.  Continue to closely monitor.  Continue supportive care.    Reviewed and approved by LOPEZ RODRIGUEZ on 1/17/24 at 10:53 PM.       Electronically Signed By: Lopez Rodriguez DO   1/17/24 10:54 PM

## 2024-01-17 PROBLEM — M79.89 LEFT UPPER EXTREMITY SWELLING: Status: RESOLVED | Noted: 2024-01-17 | Resolved: 2024-01-17

## 2024-01-17 PROBLEM — M79.89 RIGHT LEG SWELLING: Status: ACTIVE | Noted: 2024-01-17

## 2024-01-17 PROBLEM — M79.89 LEFT UPPER EXTREMITY SWELLING: Status: ACTIVE | Noted: 2024-01-17

## 2024-01-17 ASSESSMENT — ENCOUNTER SYMPTOMS
MUSCULOSKELETAL NEGATIVE: 1
PSYCHIATRIC NEGATIVE: 1
GASTROINTESTINAL NEGATIVE: 1
NEUROLOGICAL NEGATIVE: 1
RESPIRATORY NEGATIVE: 1
CONSTITUTIONAL NEGATIVE: 1
CARDIOVASCULAR NEGATIVE: 1

## 2024-01-18 NOTE — PROGRESS NOTES
Subjective   Patient ID: Estelle Jung is a 89 y.o. female.    Patient seen and examined at bedside.  Regards that she has been having some lower extremity swelling in operative leg as of late.  Recent ultrasound has been negative, however states that due to increasing her physical therapy.  Regards that her pain is well-controlled, regards no constitutional symptoms or concerns at this time.        Review of Systems   Constitutional: Negative.    HENT: Negative.     Respiratory: Negative.     Cardiovascular: Negative.    Gastrointestinal: Negative.    Musculoskeletal: Negative.    Neurological: Negative.    Psychiatric/Behavioral: Negative.         Objective Vitals Reviewed via facility EMR   Physical Exam  Constitutional:       General: She is not in acute distress.     Appearance: She is not ill-appearing.   Eyes:      Pupils: Pupils are equal, round, and reactive to light.   Cardiovascular:      Rate and Rhythm: Normal rate and regular rhythm.      Pulses: Normal pulses.      Heart sounds: No murmur heard.  Pulmonary:      Effort: No respiratory distress.      Breath sounds: No wheezing.   Abdominal:      General: Abdomen is flat. Bowel sounds are normal. There is no distension.   Musculoskeletal:      Right lower leg: No edema.      Left lower leg: No edema.      Comments: Slight swelling of RLE   Skin:     General: Skin is warm and dry.   Neurological:      Mental Status: She is alert. Mental status is at baseline.      Cranial Nerves: No cranial nerve deficit.      Motor: No weakness.   Psychiatric:         Mood and Affect: Mood normal.         Behavior: Behavior normal.         Assessment/Plan   Diagnoses and all orders for this visit:  HTN (hypertension), benign  Atrial fibrillation, currently in sinus rhythm  Left upper extremity swelling  Closed displaced intertrochanteric fracture of right femur with routine healing  Right leg swelling      Patient seen and examined, suspect that patient's underlying  swelling is overall secondary to increased mobility and working with therapy.  This likely should improve with time as she gets further out from her surgery date.  We will initiate nonpharmacological measures such as utilization of ice packs.  We did discuss potential use for medications however she defers at this time.  Low suspicion for gout or infectious etiologies as patient is able to flex and ambulate on her leg without any underlying symptoms.  Continue to closely monitor.  Continue supportive care.    Reviewed and approved by JACKY RODRIGUEZ on 1/17/24 at 10:53 PM.

## 2024-01-24 ENCOUNTER — NURSING HOME VISIT (OUTPATIENT)
Dept: POST ACUTE CARE | Facility: EXTERNAL LOCATION | Age: 89
End: 2024-01-24
Payer: MEDICARE

## 2024-01-24 DIAGNOSIS — L03.90 ACUTE CELLULITIS: ICD-10-CM

## 2024-01-24 DIAGNOSIS — I10 HTN (HYPERTENSION), BENIGN: ICD-10-CM

## 2024-01-24 DIAGNOSIS — S72.141D CLOSED DISPLACED INTERTROCHANTERIC FRACTURE OF RIGHT FEMUR WITH ROUTINE HEALING: ICD-10-CM

## 2024-01-24 DIAGNOSIS — M79.89 RIGHT LEG SWELLING: Primary | ICD-10-CM

## 2024-01-24 PROCEDURE — 99309 SBSQ NF CARE MODERATE MDM 30: CPT | Performed by: STUDENT IN AN ORGANIZED HEALTH CARE EDUCATION/TRAINING PROGRAM

## 2024-01-24 NOTE — LETTER
Patient: Estelle Jung  : 10/5/1932    Encounter Date: 2024    Subjective  Patient ID: Estelle Jung is a 89 y.o. female.    Patient seen and examined at bedside.  She regards that she is doing overall well without any concerns.  However, over the past couple days have noticed some worsening swelling and redness of the lower extremity.  She recently had a duplex scan that was negative, however swelling has significantly increased since that time.  In addition she has also noticed some spreading redness.  Otherwise, states no additional concerns.        Review of Systems   Constitutional: Negative.    HENT: Negative.     Respiratory: Negative.     Cardiovascular:  Positive for leg swelling.   Gastrointestinal: Negative.    Musculoskeletal: Negative.    Skin:  Positive for color change, rash and wound.   Neurological: Negative.    Psychiatric/Behavioral: Negative.         ObjectiveVitals Reviewed via facility EMR   Physical Exam  Constitutional:       General: She is not in acute distress.     Appearance: She is not ill-appearing.   Eyes:      Pupils: Pupils are equal, round, and reactive to light.   Cardiovascular:      Rate and Rhythm: Normal rate and regular rhythm.      Pulses: Normal pulses.      Heart sounds: No murmur heard.  Pulmonary:      Effort: No respiratory distress.      Breath sounds: No wheezing.   Abdominal:      General: Abdomen is flat. Bowel sounds are normal. There is no distension.   Musculoskeletal:         General: Tenderness present.      Right lower leg: Edema present.      Left lower leg: No edema.   Skin:     Findings: Erythema and rash present.      Comments: Warmth to touch   Neurological:      Mental Status: She is alert. Mental status is at baseline.      Cranial Nerves: No cranial nerve deficit.      Motor: No weakness.   Psychiatric:         Mood and Affect: Mood normal.         Behavior: Behavior normal.         Assessment/Plan  Diagnoses and all orders for this  visit:  Right leg swelling  Closed displaced intertrochanteric fracture of right femur with routine healing  HTN (hypertension), benign  Acute cellulitis  Patient seen and examined, due to recent worsening swelling as well as worsening erythema, recommend repeat duplex of the lower extremity.  Concern for cellulitis, will Start on doxycycline.  Continue to monitor symptoms.  Continue supportive care.    Reviewed and approved by LOPEZ RODRIGUEZ on 1/25/24 at 10:32 PM.         Electronically Signed By: Lopez Rodriguez DO   1/25/24 10:32 PM

## 2024-01-25 PROBLEM — L03.90 ACUTE CELLULITIS: Status: ACTIVE | Noted: 2024-01-25

## 2024-01-25 ASSESSMENT — ENCOUNTER SYMPTOMS
PSYCHIATRIC NEGATIVE: 1
WOUND: 1
NEUROLOGICAL NEGATIVE: 1
COLOR CHANGE: 1
GASTROINTESTINAL NEGATIVE: 1
MUSCULOSKELETAL NEGATIVE: 1
RESPIRATORY NEGATIVE: 1
CONSTITUTIONAL NEGATIVE: 1

## 2024-01-26 NOTE — PROGRESS NOTES
Subjective   Patient ID: Estelle Jung is a 89 y.o. female.    Patient seen and examined at bedside.  She regards that she is doing overall well without any concerns.  However, over the past couple days have noticed some worsening swelling and redness of the lower extremity.  She recently had a duplex scan that was negative, however swelling has significantly increased since that time.  In addition she has also noticed some spreading redness.  Otherwise, states no additional concerns.        Review of Systems   Constitutional: Negative.    HENT: Negative.     Respiratory: Negative.     Cardiovascular:  Positive for leg swelling.   Gastrointestinal: Negative.    Musculoskeletal: Negative.    Skin:  Positive for color change, rash and wound.   Neurological: Negative.    Psychiatric/Behavioral: Negative.         Objective Vitals Reviewed via facility EMR   Physical Exam  Constitutional:       General: She is not in acute distress.     Appearance: She is not ill-appearing.   Eyes:      Pupils: Pupils are equal, round, and reactive to light.   Cardiovascular:      Rate and Rhythm: Normal rate and regular rhythm.      Pulses: Normal pulses.      Heart sounds: No murmur heard.  Pulmonary:      Effort: No respiratory distress.      Breath sounds: No wheezing.   Abdominal:      General: Abdomen is flat. Bowel sounds are normal. There is no distension.   Musculoskeletal:         General: Tenderness present.      Right lower leg: Edema present.      Left lower leg: No edema.   Skin:     Findings: Erythema and rash present.      Comments: Warmth to touch   Neurological:      Mental Status: She is alert. Mental status is at baseline.      Cranial Nerves: No cranial nerve deficit.      Motor: No weakness.   Psychiatric:         Mood and Affect: Mood normal.         Behavior: Behavior normal.         Assessment/Plan   Diagnoses and all orders for this visit:  Right leg swelling  Closed displaced intertrochanteric fracture of  right femur with routine healing  HTN (hypertension), benign  Acute cellulitis  Patient seen and examined, due to recent worsening swelling as well as worsening erythema, recommend repeat duplex of the lower extremity.  Concern for cellulitis, will Start on doxycycline.  Continue to monitor symptoms.  Continue supportive care.    Reviewed and approved by JACKY RODRIGUEZ on 1/25/24 at 10:32 PM.

## 2024-02-13 LAB
ATRIAL RATE: 55 BPM
P AXIS: 47 DEGREES
P OFFSET: 176 MS
P ONSET: 123 MS
PR INTERVAL: 184 MS
Q ONSET: 215 MS
QRS COUNT: 9 BEATS
QRS DURATION: 78 MS
QT INTERVAL: 432 MS
QTC CALCULATION(BAZETT): 413 MS
QTC FREDERICIA: 419 MS
R AXIS: -23 DEGREES
T AXIS: 36 DEGREES
T OFFSET: 431 MS
VENTRICULAR RATE: 55 BPM

## 2024-03-21 ENCOUNTER — OFFICE VISIT (OUTPATIENT)
Dept: CARDIOLOGY | Facility: CLINIC | Age: 89
End: 2024-03-21
Payer: MEDICARE

## 2024-03-21 VITALS
SYSTOLIC BLOOD PRESSURE: 130 MMHG | WEIGHT: 108 LBS | BODY MASS INDEX: 19.88 KG/M2 | HEART RATE: 75 BPM | DIASTOLIC BLOOD PRESSURE: 70 MMHG | OXYGEN SATURATION: 99 % | HEIGHT: 62 IN

## 2024-03-21 DIAGNOSIS — I48.91 LONE ATRIAL FIBRILLATION (MULTI): Primary | ICD-10-CM

## 2024-03-21 DIAGNOSIS — R26.89 IMBALANCE: ICD-10-CM

## 2024-03-21 DIAGNOSIS — I10 HTN (HYPERTENSION), BENIGN: ICD-10-CM

## 2024-03-21 PROCEDURE — 3075F SYST BP GE 130 - 139MM HG: CPT | Performed by: INTERNAL MEDICINE

## 2024-03-21 PROCEDURE — 99214 OFFICE O/P EST MOD 30 MIN: CPT | Performed by: INTERNAL MEDICINE

## 2024-03-21 PROCEDURE — 1159F MED LIST DOCD IN RCRD: CPT | Performed by: INTERNAL MEDICINE

## 2024-03-21 PROCEDURE — 1160F RVW MEDS BY RX/DR IN RCRD: CPT | Performed by: INTERNAL MEDICINE

## 2024-03-21 PROCEDURE — 1036F TOBACCO NON-USER: CPT | Performed by: INTERNAL MEDICINE

## 2024-03-21 PROCEDURE — 1126F AMNT PAIN NOTED NONE PRSNT: CPT | Performed by: INTERNAL MEDICINE

## 2024-03-21 PROCEDURE — 3078F DIAST BP <80 MM HG: CPT | Performed by: INTERNAL MEDICINE

## 2024-03-21 ASSESSMENT — ENCOUNTER SYMPTOMS
DEPRESSION: 0
OCCASIONAL FEELINGS OF UNSTEADINESS: 0
LOSS OF SENSATION IN FEET: 0

## 2024-03-21 ASSESSMENT — PAIN SCALES - GENERAL: PAINLEVEL: 0-NO PAIN

## 2024-03-21 NOTE — PROGRESS NOTES
Chief Complaint:   No chief complaint on file.     History Of Present Illness:    Estelle Jung is a 89 y.o. female with a history of lone atrial fibrillation, vasodepressive syncope, and hypertension here for follow-up.     Fractured right leg.  Underwent surgery.  Still has some swelling of the right lower extremity but it is getting better.  Was advised to try using compression socks.  She is able to put it on the left but not on the right.  Was told about Tubigrip but has not yet purchased any.    Blood pressure has been relatively well-controlled.    Denies any palpitations.     Only 2 episodes of elevated heart rate. The first time she had it while in bed. She waited until the morning when her neighbors were awake and then asked to be brought to the hospital. By the time she got here the palpitations have subsided. The second time she had an elevated heart rate was when she passed out at a family event. She was found to be in atrial fibrillation at that point. She converted and has not had another recurrence.     Lexiscan nuclear stress test 12/31/2019: No evidence of ischemia or scar. EF greater than 65%.     Echocardiogram 12/29/2019: EF 60 to 65%. Grade 1 diastolic dysfunction. Moderate TR. Aortic valve sclerosis without stenosis.      Past Medical History:  She has a past medical history of Personal history of diseases of the skin and subcutaneous tissue, Personal history of other endocrine, nutritional and metabolic disease (06/08/2015), and Personal history of other specified conditions.    Past Surgical History:  She has a past surgical history that includes Other surgical history (10/09/2014); Other surgical history (10/09/2014); Other surgical history (10/09/2014); Kidney surgery (10/09/2014); and Colonoscopy (10/09/2014).      Social History:  She reports that she quit smoking about 64 years ago. Her smoking use included cigarettes. She has never used smokeless tobacco. She reports current alcohol  "use of about 5.0 standard drinks of alcohol per week. She reports that she does not use drugs.    Family History:  No family history on file.     Allergies:  Niacin    Outpatient Medications:  Current Outpatient Medications   Medication Instructions    amLODIPine (NORVASC) 5 mg, oral, Every evening    calcium carbonate 600 mg calcium (1,500 mg) tablet 1 tablet, oral, Daily    cholecalciferol (VITAMIN D-3) 2,000 Units, oral, 2 times daily    clobetasol (Olux) 0.05 % topical foam Apply 1 Application topically once daily as needed (inflammation).    coenzyme Q-10 200 mg, oral, Daily    diclofenac sodium (VOLTAREN) 4 g, Topical, 4 times daily PRN    dorzolamide-timoloL (Cosopt) 22.3-6.8 mg/mL ophthalmic solution 1 drop, Both Eyes, 2 times daily    hyoscyamine (ANASPAZ) 0.125 mg, sublingual, Every 4 hours PRN    metoprolol succinate XL (TOPROL-XL) 25 mg, oral, 2 times daily    metroNIDAZOLE (Metrogel) 1 % gel 1 Application, Topical, Daily PRN    multivitamin (Tab-A-Emily) tablet 1 tablet, oral, Daily    multivitamin with minerals tablet 1 tablet, oral, Daily    neomycin-polymyxin-dexAMETHasone (Maxitrol) 3.5mg/mL-10,000 unit/mL-0.1 % ophthalmic suspension Administer 1 drop into the right eye every 7 days.    omeprazole (PRILOSEC) 20 mg, oral, Daily    pravastatin (PRAVACHOL) 20 mg, oral, Nightly       Last Recorded Vitals:  Visit Vitals  /70 (BP Location: Right arm, Patient Position: Sitting)   Pulse 75   Ht 1.575 m (5' 2\")   Wt 49 kg (108 lb)   SpO2 99%   BMI 19.75 kg/m²   Smoking Status Former   BSA 1.46 m²      LASTWT(3):   Wt Readings from Last 3 Encounters:   03/21/24 49 kg (108 lb)   01/01/24 55.8 kg (123 lb)   06/12/23 54 kg (119 lb)       Physical Exam:  In general: alert and in no acute distress.   HEENT: Carotid upstrokes normal with no bruits. JVP is normal.   Pulmonary: Clear to auscultation bilaterally.  Cardiovascular: S1,S2, regular. No appreciable murmurs, rubs or gallops.   Lower extremities: Warm. " "2+ distal pulses.  1+ right lower extremity swelling and trace on the left.     Last Labs:  CBC -  Recent Labs     01/08/24  0632 01/07/24  1027 01/06/24  0715   WBC 7.4 9.6 8.3   HGB 7.6* 7.9* 7.5*   HCT 24.4* 25.5* 23.7*    385 315   * 102* 100       CMP -  Recent Labs     01/08/24  0632 01/07/24  1027 01/06/24  0715 01/05/24  0525    138 137 136   K 3.9 3.4* 3.8 3.8    104 106 106   CO2 25 27 26 26   ANIONGAP 11 10 9* 8*   BUN 11 11 9 12   CREATININE 0.33* 0.37* 0.36* 0.32*   EGFR >90 >90 >90 >90   MG 1.75 1.74  --  2.02     Recent Labs     01/08/24  0632 01/07/24  1027 01/06/24  0715   ALBUMIN 2.5* 2.7* 2.7*   ALKPHOS 34 33 33   ALT 11 10 10   AST 19 17 18   BILITOT 0.8 0.8 0.8       LIPID PANEL -   No results for input(s): \"CHOL\", \"LDLCALC\", \"LDLF\", \"HDL\", \"TRIG\" in the last 19801 hours.    No results for input(s): \"BNP\", \"HGBA1C\" in the last 68475 hours.        Assessment/Plan   1) lone atrial fibrillation: At least 2 episodes of palpitations only 1 documentation of atrial fibrillation. Has not recurred. Is on rate control with metoprolol.     2) hypertension: Well-controlled.     3) chronic venous insufficiency: Asked her to get Tubigrip and when her leg swelling improves she can go back to wearing the compression stockings.     4) follow-up: 6 months or sooner if needed       Van Chavira MD  "

## 2024-05-13 ENCOUNTER — HOSPITAL ENCOUNTER (OUTPATIENT)
Dept: RADIOLOGY | Facility: CLINIC | Age: 89
Discharge: HOME | End: 2024-05-13
Payer: MEDICARE

## 2024-05-13 VITALS — HEIGHT: 62 IN | WEIGHT: 108.03 LBS | BODY MASS INDEX: 19.88 KG/M2

## 2024-05-13 DIAGNOSIS — Z12.31 ENCOUNTER FOR SCREENING MAMMOGRAM FOR MALIGNANT NEOPLASM OF BREAST: ICD-10-CM

## 2024-05-13 PROCEDURE — 77067 SCR MAMMO BI INCL CAD: CPT

## 2024-05-13 PROCEDURE — 77067 SCR MAMMO BI INCL CAD: CPT | Performed by: STUDENT IN AN ORGANIZED HEALTH CARE EDUCATION/TRAINING PROGRAM

## 2024-05-13 PROCEDURE — 77063 BREAST TOMOSYNTHESIS BI: CPT | Performed by: STUDENT IN AN ORGANIZED HEALTH CARE EDUCATION/TRAINING PROGRAM

## 2024-05-22 ENCOUNTER — TELEPHONE (OUTPATIENT)
Dept: CARDIOLOGY | Facility: CLINIC | Age: 89
End: 2024-05-22

## 2024-05-22 NOTE — TELEPHONE ENCOUNTER
Patient called and said the pharmacy told her that her multivitamin prescription had been cancelled by our office, she is requesting a refill thanks.

## 2024-07-25 DIAGNOSIS — I10 HTN (HYPERTENSION), BENIGN: Primary | ICD-10-CM

## 2024-07-25 RX ORDER — AMLODIPINE BESYLATE 5 MG/1
5 TABLET ORAL NIGHTLY
Qty: 90 TABLET | Refills: 3 | Status: SHIPPED | OUTPATIENT
Start: 2024-07-25 | End: 2025-07-25

## 2024-09-17 ENCOUNTER — APPOINTMENT (OUTPATIENT)
Dept: CARDIOLOGY | Facility: CLINIC | Age: 89
End: 2024-09-17
Payer: MEDICARE

## 2024-09-17 VITALS
BODY MASS INDEX: 19.42 KG/M2 | SYSTOLIC BLOOD PRESSURE: 146 MMHG | RESPIRATION RATE: 18 BRPM | HEIGHT: 63 IN | WEIGHT: 109.6 LBS | HEART RATE: 59 BPM | DIASTOLIC BLOOD PRESSURE: 74 MMHG | OXYGEN SATURATION: 99 %

## 2024-09-17 DIAGNOSIS — I10 HTN (HYPERTENSION), BENIGN: ICD-10-CM

## 2024-09-17 DIAGNOSIS — I48.91 LONE ATRIAL FIBRILLATION (MULTI): Primary | ICD-10-CM

## 2024-09-17 PROCEDURE — 3078F DIAST BP <80 MM HG: CPT | Performed by: NURSE PRACTITIONER

## 2024-09-17 PROCEDURE — 1160F RVW MEDS BY RX/DR IN RCRD: CPT | Performed by: NURSE PRACTITIONER

## 2024-09-17 PROCEDURE — 99214 OFFICE O/P EST MOD 30 MIN: CPT | Performed by: NURSE PRACTITIONER

## 2024-09-17 PROCEDURE — 1036F TOBACCO NON-USER: CPT | Performed by: NURSE PRACTITIONER

## 2024-09-17 PROCEDURE — 3077F SYST BP >= 140 MM HG: CPT | Performed by: NURSE PRACTITIONER

## 2024-09-17 PROCEDURE — 1159F MED LIST DOCD IN RCRD: CPT | Performed by: NURSE PRACTITIONER

## 2024-09-17 RX ORDER — AMLODIPINE BESYLATE 5 MG/1
5 TABLET ORAL NIGHTLY
Qty: 90 TABLET | Refills: 3 | Status: SHIPPED | OUTPATIENT
Start: 2024-09-17 | End: 2025-09-17

## 2024-09-17 RX ORDER — DIAZEPAM 5 MG/1
5 TABLET ORAL
COMMUNITY

## 2024-09-17 NOTE — PROGRESS NOTES
"Name : Estelle Jung   : 10/5/1932   MRN : [id number]   ENC Date : 2024    CC: Hypertension     HPI:    Estelle Jung is a 89 y.o. female with PMHx sig for lone atrial fibrillation, vasodepressive syncope, hypertension & IBS-D here for follow-up.     Only complaint today is discomfort from her hip & \"digestive issues\". Denies any chest pain, pressure, SOB/NORMAN, PND, orthopnea, LE edema, palpitations, lightheadedness, dizziness, or syncope.     Retired .    CV Diagnostics:  Lexiscan nuclear stress test 2019: No evidence of ischemia or scar. EF greater than 65%.     Echocardiogram 2019: EF 60 to 65%. Grade 1 diastolic dysfunction. Moderate TR. Aortic valve sclerosis without stenosis.     ROS: unless otherwise noted in the history of present illness, all other systems were reviewed and they are negative for complaints     Allergies:  Niacin    Current Outpatient Medications   Medication Instructions    amLODIPine (NORVASC) 5 mg, oral, Nightly    calcium carbonate 600 mg calcium (1,500 mg) tablet 1 tablet, oral, Daily    cholecalciferol (VITAMIN D-3) 2,000 Units, oral, 2 times daily    clobetasol (Olux) 0.05 % topical foam Apply 1 Application topically once daily as needed (inflammation).    coenzyme Q-10 200 mg, oral, Daily    diazePAM (VALIUM) 5 mg, oral    diclofenac sodium (VOLTAREN) 4 g, Topical, 4 times daily PRN    dorzolamide-timoloL (Cosopt) 22.3-6.8 mg/mL ophthalmic solution 1 drop, Both Eyes, 2 times daily    hyoscyamine (ANASPAZ) 0.125 mg, sublingual, Every 4 hours PRN    metoprolol succinate XL (TOPROL-XL) 25 mg, oral, 2 times daily    metroNIDAZOLE (Metrogel) 1 % gel 1 Application, Topical, Daily PRN    multivitamin with minerals tablet 1 tablet, oral, Daily    neomycin-polymyxin-dexAMETHasone (Maxitrol) 3.5mg/mL-10,000 unit/mL-0.1 % ophthalmic suspension Administer 1 drop into the right eye every 7 days.    omeprazole (PRILOSEC) 20 mg, oral, Daily    " "pravastatin (PRAVACHOL) 20 mg, oral, Nightly        Last Labs:  CBC  Lab Results   Component Value Date    WBC 7.4 01/08/2024    HGB 7.6 (L) 01/08/2024    HCT 24.4 (L) 01/08/2024     (H) 01/08/2024     01/08/2024       CMP  Lab Results   Component Value Date    CALCIUM 7.8 (L) 01/08/2024    PHOS 3.2 01/02/2024    PROT 4.5 (L) 01/08/2024    ALBUMIN 2.5 (L) 01/08/2024    AST 19 01/08/2024    ALT 11 01/08/2024    ALKPHOS 34 01/08/2024    BILITOT 0.8 01/08/2024       BMP   Lab Results   Component Value Date     01/08/2024    K 3.9 01/08/2024     01/08/2024    CO2 25 01/08/2024    GLUCOSE 101 (H) 01/08/2024    BUN 11 01/08/2024    CREATININE 0.33 (L) 01/08/2024       LIPID PANEL   No results found for: \"CHOL\", \"TRIG\", \"HDL\", \"CHHDL\", \"LDLF\", \"VLDL\", \"NHDL\"    RENAL FUNCTION PANEL   Lab Results   Component Value Date    GLUCOSE 101 (H) 01/08/2024     01/08/2024    K 3.9 01/08/2024     01/08/2024    CO2 25 01/08/2024    ANIONGAP 11 01/08/2024    BUN 11 01/08/2024    CREATININE 0.33 (L) 01/08/2024    CALCIUM 7.8 (L) 01/08/2024    PHOS 3.2 01/02/2024    ALBUMIN 2.5 (L) 01/08/2024        No results found for: \"BNP\", \"HGBA1C\"  I have reviewed the above labs & diagnostics    Last Recorded Vitals:  Vitals:    09/17/24 1045   BP: 146/74   BP Location: Left arm   Patient Position: Sitting   Pulse: 59   Resp: 18   SpO2: 99%   Weight: 49.7 kg (109 lb 9.6 oz)   Height: 1.6 m (5' 3\")     Physical Exam:  On exam Ms. Estelle Jung appears her stated age, is alert and oriented x3, and in no acute distress. Her sclera are anicteric and her oropharynx has moist mucous membranes. Her neck is supple and without thyromegaly. The JVP is ~5 cm of water above the right atrium. Her cardiac exam has regular rhythm, normal S1, S2. No S3/4. There are no murmurs. Her lungs are clear to auscultation bilaterally and there is no dullness to percussion. Her abdomen is soft, nontender with normoactive bowel sounds. " There is no HJR. The extremities are warm and without edema. The skin is dry. There is no rash present. The distal pulses are 2-3+ in all four extremities. Her mood and affect are appropriate for todays encounter.     Assessment/Plan:  1) lone atrial fibrillation: At least 2 episodes of palpitations only 1 documentation of atrial fibrillation. Has not recurred. Is on rate control with metoprolol.      2) hypertension: Well-controlled on current regimen.     3) chronic venous insufficiency: stable.    Follow up with Fan as scheduled    Tracy M Schwab, APRN-CNP

## 2024-10-25 DIAGNOSIS — E78.2 MIXED HYPERLIPIDEMIA: ICD-10-CM

## 2024-10-25 RX ORDER — PRAVASTATIN SODIUM 20 MG/1
20 TABLET ORAL NIGHTLY
Qty: 90 TABLET | Refills: 3 | Status: SHIPPED | OUTPATIENT
Start: 2024-10-25 | End: 2025-10-25

## 2024-10-27 DIAGNOSIS — I10 ESSENTIAL HYPERTENSION: ICD-10-CM

## 2024-10-28 RX ORDER — METOPROLOL SUCCINATE 25 MG/1
25 TABLET, EXTENDED RELEASE ORAL 2 TIMES DAILY
Qty: 180 TABLET | Refills: 3 | Status: SHIPPED | OUTPATIENT
Start: 2024-10-28

## 2024-11-07 ENCOUNTER — TELEPHONE (OUTPATIENT)
Dept: CARDIOLOGY | Facility: CLINIC | Age: 89
End: 2024-11-07
Payer: MEDICARE

## 2024-11-07 DIAGNOSIS — I10 ESSENTIAL HYPERTENSION: ICD-10-CM

## 2024-11-07 NOTE — TELEPHONE ENCOUNTER
Estelle requesting refill for metoprolol 25 mg one tablet twice daily #180 x3   at [facility]

## 2024-11-08 RX ORDER — METOPROLOL SUCCINATE 25 MG/1
25 TABLET, EXTENDED RELEASE ORAL 2 TIMES DAILY
Qty: 180 TABLET | Refills: 3 | Status: SHIPPED | OUTPATIENT
Start: 2024-11-08

## 2025-02-11 ENCOUNTER — HOSPITAL ENCOUNTER (OUTPATIENT)
Dept: RADIOLOGY | Facility: CLINIC | Age: OVER 89
Discharge: HOME | End: 2025-02-11
Payer: MEDICARE

## 2025-02-11 DIAGNOSIS — M81.0 AGE-RELATED OSTEOPOROSIS WITHOUT CURRENT PATHOLOGICAL FRACTURE: ICD-10-CM

## 2025-02-11 PROCEDURE — 77080 DXA BONE DENSITY AXIAL: CPT

## 2025-02-11 PROCEDURE — 77080 DXA BONE DENSITY AXIAL: CPT | Performed by: RADIOLOGY

## 2025-03-25 ENCOUNTER — APPOINTMENT (OUTPATIENT)
Dept: CARDIOLOGY | Facility: CLINIC | Age: OVER 89
End: 2025-03-25
Payer: MEDICARE

## 2025-04-01 ENCOUNTER — APPOINTMENT (OUTPATIENT)
Dept: CARDIOLOGY | Facility: CLINIC | Age: OVER 89
End: 2025-04-01
Payer: MEDICARE

## 2025-04-01 VITALS
WEIGHT: 112 LBS | HEIGHT: 63 IN | HEART RATE: 62 BPM | DIASTOLIC BLOOD PRESSURE: 76 MMHG | SYSTOLIC BLOOD PRESSURE: 154 MMHG | BODY MASS INDEX: 19.84 KG/M2 | OXYGEN SATURATION: 98 %

## 2025-04-01 DIAGNOSIS — I48.91 LONE ATRIAL FIBRILLATION (MULTI): Primary | ICD-10-CM

## 2025-04-01 DIAGNOSIS — I10 HTN (HYPERTENSION), BENIGN: ICD-10-CM

## 2025-04-01 DIAGNOSIS — I87.2 CHRONIC VENOUS INSUFFICIENCY: ICD-10-CM

## 2025-04-01 PROCEDURE — 93010 ELECTROCARDIOGRAM REPORT: CPT | Performed by: INTERNAL MEDICINE

## 2025-04-01 PROCEDURE — 99214 OFFICE O/P EST MOD 30 MIN: CPT | Mod: 25 | Performed by: INTERNAL MEDICINE

## 2025-04-01 PROCEDURE — 1159F MED LIST DOCD IN RCRD: CPT | Performed by: INTERNAL MEDICINE

## 2025-04-01 PROCEDURE — 1036F TOBACCO NON-USER: CPT | Performed by: INTERNAL MEDICINE

## 2025-04-01 PROCEDURE — 3078F DIAST BP <80 MM HG: CPT | Performed by: INTERNAL MEDICINE

## 2025-04-01 PROCEDURE — 93005 ELECTROCARDIOGRAM TRACING: CPT | Performed by: INTERNAL MEDICINE

## 2025-04-01 PROCEDURE — 1126F AMNT PAIN NOTED NONE PRSNT: CPT | Performed by: INTERNAL MEDICINE

## 2025-04-01 PROCEDURE — 99214 OFFICE O/P EST MOD 30 MIN: CPT | Performed by: INTERNAL MEDICINE

## 2025-04-01 PROCEDURE — 1160F RVW MEDS BY RX/DR IN RCRD: CPT | Performed by: INTERNAL MEDICINE

## 2025-04-01 PROCEDURE — 3077F SYST BP >= 140 MM HG: CPT | Performed by: INTERNAL MEDICINE

## 2025-04-01 RX ORDER — ASPIRIN 81 MG/1
81 TABLET ORAL 3 TIMES WEEKLY
COMMUNITY

## 2025-04-01 ASSESSMENT — ENCOUNTER SYMPTOMS
LOSS OF SENSATION IN FEET: 0
DEPRESSION: 0
OCCASIONAL FEELINGS OF UNSTEADINESS: 1

## 2025-04-01 ASSESSMENT — PAIN SCALES - GENERAL: PAINLEVEL_OUTOF10: 0-NO PAIN

## 2025-04-01 NOTE — PROGRESS NOTES
Chief Complaint:   No chief complaint on file.     History Of Present Illness:    Estelle Jung is a 89 y.o. female with a history of lone atrial fibrillation, vasodepressive syncope, and hypertension here for follow-up.     Overall doing well.  Denies any exertional chest pain or shortness of breath.    Reviewed Tracy Schwab's note from 9/17/2024.     Only 2 episodes of elevated heart rate. The first time she had it while in bed. She waited until the morning when her neighbors were awake and then asked to be brought to the hospital. By the time she got here the palpitations have subsided. The second time she had an elevated heart rate was when she passed out at a family event. She was found to be in atrial fibrillation at that point. She converted and has not had another recurrence.     Lexiscan nuclear stress test 12/31/2019: No evidence of ischemia or scar. EF greater than 65%.     Echocardiogram 12/29/2019: EF 60 to 65%. Grade 1 diastolic dysfunction. Moderate TR. Aortic valve sclerosis without stenosis.      Past Medical History:  She has a past medical history of Personal history of diseases of the skin and subcutaneous tissue, Personal history of other endocrine, nutritional and metabolic disease (06/08/2015), and Personal history of other specified conditions.    Past Surgical History:  She has a past surgical history that includes Other surgical history (10/09/2014); Other surgical history (10/09/2014); Other surgical history (10/09/2014); Kidney surgery (10/09/2014); and Colonoscopy (10/09/2014).      Social History:  She reports that she quit smoking about 65 years ago. Her smoking use included cigarettes. She has never used smokeless tobacco. She reports current alcohol use of about 5.0 standard drinks of alcohol per week. She reports that she does not use drugs.    Family History:  Family History   Problem Relation Name Age of Onset    Breast cancer Mother's Sister         "  Allergies:  Niacin    Outpatient Medications:  Current Outpatient Medications   Medication Instructions    amLODIPine (NORVASC) 5 mg, oral, Nightly    aspirin 81 mg, oral, 3 times weekly, Takes on MWF    calcium carbonate 600 mg calcium (1,500 mg) tablet 1 tablet, Daily    cholecalciferol (VITAMIN D-3) 2,000 Units, 2 times daily    clobetasol (Olux) 0.05 % topical foam Apply 1 Application topically once daily as needed (inflammation).    coenzyme Q-10 100 mg, oral, 2 times daily    diazePAM (VALIUM) 5 mg, As needed    diclofenac sodium (VOLTAREN) 4 g, 4 times daily PRN    dorzolamide-timoloL (Cosopt) 22.3-6.8 mg/mL ophthalmic solution 1 drop, 2 times daily    hyoscyamine (ANASPAZ) 0.125 mg, Every 4 hours PRN    metoprolol succinate XL (TOPROL-XL) 25 mg, oral, 2 times daily    metroNIDAZOLE (Metrogel) 1 % gel 1 Application, Daily PRN    neomycin-polymyxin-dexAMETHasone (Maxitrol) 3.5mg/mL-10,000 unit/mL-0.1 % ophthalmic suspension Administer 1 drop into the right eye every 7 days.    omeprazole (PRILOSEC) 20 mg, Daily    pravastatin (PRAVACHOL) 20 mg, oral, Nightly       Last Recorded Vitals:  Visit Vitals  /76 (BP Location: Left arm, Patient Position: Sitting, BP Cuff Size: Adult)   Pulse 62   Ht 1.6 m (5' 3\")   Wt 50.8 kg (112 lb)   SpO2 98%   BMI 19.84 kg/m²   OB Status Postmenopausal   Smoking Status Former   BSA 1.5 m²      LASTWT(3):   Wt Readings from Last 3 Encounters:   04/01/25 50.8 kg (112 lb)   09/17/24 49.7 kg (109 lb 9.6 oz)   05/13/24 49 kg (108 lb 0.4 oz)       Physical Exam:  In general: alert and in no acute distress.   HEENT: Carotid upstrokes normal with no bruits. JVP is normal.   Pulmonary: Clear to auscultation bilaterally.  Cardiovascular: S1,S2, regular. No appreciable murmurs, rubs or gallops.   Lower extremities: Warm. 2+ distal pulses.  1+ right lower extremity swelling and trace on the left.     Last Labs:  CBC -  Recent Labs     01/08/24  0632 01/07/24  1027 01/06/24  0715   WBC " "7.4 9.6 8.3   HGB 7.6* 7.9* 7.5*   HCT 24.4* 25.5* 23.7*    385 315   * 102* 100       CMP -  Recent Labs     01/08/24  0632 01/07/24  1027 01/06/24  0715 01/05/24  0525    138 137 136   K 3.9 3.4* 3.8 3.8    104 106 106   CO2 25 27 26 26   ANIONGAP 11 10 9* 8*   BUN 11 11 9 12   CREATININE 0.33* 0.37* 0.36* 0.32*   EGFR >90 >90 >90 >90   MG 1.75 1.74  --  2.02     Recent Labs     01/08/24  0632 01/07/24  1027 01/06/24  0715   ALBUMIN 2.5* 2.7* 2.7*   ALKPHOS 34 33 33   ALT 11 10 10   AST 19 17 18   BILITOT 0.8 0.8 0.8       LIPID PANEL -   No results for input(s): \"CHOL\", \"LDLCALC\", \"LDLF\", \"HDL\", \"TRIG\" in the last 29188 hours.    No results for input(s): \"BNP\", \"HGBA1C\" in the last 66204 hours.        Assessment/Plan   1) lone atrial fibrillation: At least 2 episodes of palpitations only 1 documentation of atrial fibrillation. Has not recurred. Is on rate control with metoprolol.     2) hypertension: Well-controlled at home.  No changes needed at this time.     3) chronic venous insufficiency: Significant leg swelling.  Continue to observe.     4) Dyslipidemia: Asked her to stop the co-Q10.  Can continue with pravastatin.    5) follow-up: 6 months or sooner if needed       Van Chavira MD  "

## 2025-06-09 ENCOUNTER — HOSPITAL ENCOUNTER (OUTPATIENT)
Dept: RADIOLOGY | Facility: CLINIC | Age: OVER 89
Discharge: HOME | End: 2025-06-09
Payer: MEDICARE

## 2025-06-09 VITALS — HEIGHT: 63 IN | BODY MASS INDEX: 19.84 KG/M2 | WEIGHT: 112 LBS

## 2025-06-09 DIAGNOSIS — Z12.31 ENCOUNTER FOR SCREENING MAMMOGRAM FOR MALIGNANT NEOPLASM OF BREAST: ICD-10-CM

## 2025-06-09 PROCEDURE — 77067 SCR MAMMO BI INCL CAD: CPT

## 2025-06-09 PROCEDURE — 77063 BREAST TOMOSYNTHESIS BI: CPT | Performed by: RADIOLOGY

## 2025-06-09 PROCEDURE — 77067 SCR MAMMO BI INCL CAD: CPT | Performed by: RADIOLOGY

## 2025-09-10 ENCOUNTER — APPOINTMENT (OUTPATIENT)
Dept: GASTROENTEROLOGY | Facility: CLINIC | Age: OVER 89
End: 2025-09-10
Payer: MEDICARE

## (undated) DEVICE — SUTURE, POLYSORB, 1, 36 IN, GS21, UNDYED

## (undated) DEVICE — APPLICATOR, CHLORAPREP, W/ORANGE TINT, 26ML

## (undated) DEVICE — SOLUTION, IRRIGATION, SODIUM CHLORIDE 0.9%, 1000 ML, POUR BOTTLE

## (undated) DEVICE — SUTURE, VICRYL, 2-0, 36 IN, CT-1, UNDYED

## (undated) DEVICE — DRILL, STRYKER 4.2 X 300

## (undated) DEVICE — GLOVE, SURGICAL, PROTEXIS PI BLUE W/NEUTHERA, 8.0, PF, LF

## (undated) DEVICE — SKIN CLOSURE SYS, PREMIERPRO EXOFIN, 1-4CM X 22CM, 1.75G TUBE

## (undated) DEVICE — Device

## (undated) DEVICE — DRESSING, MEPILEX BORDER, POST-OP AG, 4 X 10 IN

## (undated) DEVICE — K-WIRE, 03.2 X 450MM

## (undated) DEVICE — DRAPE, SHEET, UTILITY, NON ABSORBENT, 18 X 26 IN, LF

## (undated) DEVICE — GLOVE, SURGICAL, PROTEXIS PI , 7.5, PF, LF

## (undated) DEVICE — COVER, TABLE, 54X90

## (undated) DEVICE — SOLUTION, IRRIGATION, STERILE WATER, 1000 ML, POUR BOTTLE

## (undated) DEVICE — TOWEL PACK, STERILE, 4/PACK, BLUE

## (undated) DEVICE — DRAPE, ISOLATION, ANTIMICROBIAL, IOBAN, W/FILM & POUCH, LARGE, 32 X 70 CM

## (undated) DEVICE — SUTURE, STRATAFIX, 3-0 MONOCRYL PLUS, PS-2 SPIRAL UNDYED